# Patient Record
Sex: FEMALE | Race: WHITE | Employment: FULL TIME
[De-identification: names, ages, dates, MRNs, and addresses within clinical notes are randomized per-mention and may not be internally consistent; named-entity substitution may affect disease eponyms.]

---

## 2017-01-12 ENCOUNTER — OFFICE VISIT (OUTPATIENT)
Dept: FAMILY MEDICINE CLINIC | Facility: CLINIC | Age: 22
End: 2017-01-12

## 2017-01-12 DIAGNOSIS — L30.4 PRURITIC INTERTRIGO: ICD-10-CM

## 2017-01-12 DIAGNOSIS — R10.11 RUQ PAIN: ICD-10-CM

## 2017-01-12 DIAGNOSIS — F90.0 ATTENTION DEFICIT HYPERACTIVITY DISORDER (ADHD), PREDOMINANTLY INATTENTIVE TYPE: Primary | ICD-10-CM

## 2017-01-12 PROCEDURE — 99214 OFFICE O/P EST MOD 30 MIN: CPT | Performed by: NURSE PRACTITIONER

## 2017-01-12 RX ORDER — IODOQUINOL, HYDROCORTISONE ACETATE AND ALOE VERA LEAF 10; 20; 10 MG/G; MG/G; MG/G
1 GEL TOPICAL 4 TIMES DAILY
Qty: 48 G | Refills: 1 | Status: SHIPPED | OUTPATIENT
Start: 2017-01-12 | End: 2017-01-23 | Stop reason: SDUPTHER

## 2017-01-12 RX ORDER — DEXTROAMPHETAMINE SACCHARATE, AMPHETAMINE ASPARTATE MONOHYDRATE, DEXTROAMPHETAMINE SULFATE AND AMPHETAMINE SULFATE 5; 5; 5; 5 MG/1; MG/1; MG/1; MG/1
20 CAPSULE, EXTENDED RELEASE ORAL DAILY
Qty: 30 CAPSULE | Refills: 0 | Status: SHIPPED | OUTPATIENT
Start: 2017-01-12 | End: 2017-06-26 | Stop reason: SDUPTHER

## 2017-01-12 RX ORDER — CETIRIZINE HYDROCHLORIDE 10 MG/1
10 TABLET ORAL DAILY
Qty: 30 TABLET | Refills: 3 | Status: SHIPPED | OUTPATIENT
Start: 2017-01-12 | End: 2017-02-22 | Stop reason: SDUPTHER

## 2017-01-12 RX ORDER — DEXTROAMPHETAMINE SACCHARATE, AMPHETAMINE ASPARTATE MONOHYDRATE, DEXTROAMPHETAMINE SULFATE AND AMPHETAMINE SULFATE 5; 5; 5; 5 MG/1; MG/1; MG/1; MG/1
20 CAPSULE, EXTENDED RELEASE ORAL EVERY MORNING
Qty: 30 CAPSULE | Refills: 0 | Status: SHIPPED | OUTPATIENT
Start: 2017-01-12 | End: 2017-02-22

## 2017-01-12 ASSESSMENT — PATIENT HEALTH QUESTIONNAIRE - PHQ9
SUM OF ALL RESPONSES TO PHQ9 QUESTIONS 1 & 2: 0
2. FEELING DOWN, DEPRESSED OR HOPELESS: 0
SUM OF ALL RESPONSES TO PHQ QUESTIONS 1-9: 0
1. LITTLE INTEREST OR PLEASURE IN DOING THINGS: 0

## 2017-01-13 ASSESSMENT — ENCOUNTER SYMPTOMS
ABDOMINAL PAIN: 1
DIARRHEA: 1
ALLERGIC/IMMUNOLOGIC NEGATIVE: 1
WHEEZING: 0
COUGH: 0
EYES NEGATIVE: 1
CONSTIPATION: 0
RESPIRATORY NEGATIVE: 1
SHORTNESS OF BREATH: 0

## 2017-01-23 DIAGNOSIS — L30.4 PRURITIC INTERTRIGO: Primary | ICD-10-CM

## 2017-01-23 RX ORDER — IODOQUINOL, HYDROCORTISONE ACETATE AND ALOE VERA LEAF 10; 20; 10 MG/G; MG/G; MG/G
1 GEL TOPICAL 4 TIMES DAILY
Qty: 48 G | Refills: 1 | Status: SHIPPED | OUTPATIENT
Start: 2017-01-23 | End: 2017-02-22

## 2017-02-13 ENCOUNTER — TELEPHONE (OUTPATIENT)
Dept: FAMILY MEDICINE CLINIC | Facility: CLINIC | Age: 22
End: 2017-02-13

## 2017-02-14 RX ORDER — VALACYCLOVIR HYDROCHLORIDE 1 G/1
2000 TABLET, FILM COATED ORAL 2 TIMES DAILY
Qty: 4 TABLET | Refills: 2 | Status: SHIPPED | OUTPATIENT
Start: 2017-02-14 | End: 2017-02-15

## 2017-02-22 ENCOUNTER — OFFICE VISIT (OUTPATIENT)
Dept: FAMILY MEDICINE CLINIC | Facility: CLINIC | Age: 22
End: 2017-02-22

## 2017-02-22 VITALS
OXYGEN SATURATION: 99 % | SYSTOLIC BLOOD PRESSURE: 120 MMHG | BODY MASS INDEX: 31.07 KG/M2 | DIASTOLIC BLOOD PRESSURE: 68 MMHG | HEART RATE: 101 BPM | WEIGHT: 181 LBS

## 2017-02-22 DIAGNOSIS — L30.4 PRURITIC INTERTRIGO: ICD-10-CM

## 2017-02-22 DIAGNOSIS — Z11.4 SCREENING FOR HIV (HUMAN IMMUNODEFICIENCY VIRUS): ICD-10-CM

## 2017-02-22 DIAGNOSIS — B37.2 CANDIDAL INTERTRIGO: Primary | ICD-10-CM

## 2017-02-22 DIAGNOSIS — J30.2 SEASONAL ALLERGIC RHINITIS, UNSPECIFIED ALLERGIC RHINITIS TRIGGER: ICD-10-CM

## 2017-02-22 PROCEDURE — 99214 OFFICE O/P EST MOD 30 MIN: CPT | Performed by: NURSE PRACTITIONER

## 2017-02-22 RX ORDER — CETIRIZINE HYDROCHLORIDE 10 MG/1
10 TABLET ORAL DAILY
Qty: 30 TABLET | Refills: 3 | Status: SHIPPED | OUTPATIENT
Start: 2017-02-22 | End: 2017-06-26 | Stop reason: SDUPTHER

## 2017-02-22 RX ORDER — RANITIDINE 150 MG/1
150 TABLET ORAL NIGHTLY
Qty: 30 TABLET | Refills: 3 | Status: SHIPPED | OUTPATIENT
Start: 2017-02-22 | End: 2021-07-08

## 2017-02-22 RX ORDER — HYDROCORTISONE, IODOQUINOL 10; 10 MG/G; MG/G
1 CREAM TOPICAL 4 TIMES DAILY
Qty: 60 G | Refills: 0 | Status: SHIPPED | OUTPATIENT
Start: 2017-02-22 | End: 2021-07-08

## 2017-02-22 RX ORDER — MONTELUKAST SODIUM 10 MG/1
10 TABLET ORAL NIGHTLY
Qty: 30 TABLET | Refills: 3 | Status: SHIPPED | OUTPATIENT
Start: 2017-02-22 | End: 2021-07-08

## 2017-02-22 RX ORDER — GABAPENTIN 300 MG/1
CAPSULE ORAL
Qty: 90 CAPSULE | Refills: 3 | Status: SHIPPED | OUTPATIENT
Start: 2017-02-22 | End: 2021-07-08

## 2017-02-22 ASSESSMENT — ENCOUNTER SYMPTOMS
DIARRHEA: 0
GASTROINTESTINAL NEGATIVE: 1
ALLERGIC/IMMUNOLOGIC NEGATIVE: 1
COUGH: 0
SHORTNESS OF BREATH: 0
ABDOMINAL PAIN: 0
RESPIRATORY NEGATIVE: 1
EYES NEGATIVE: 1
CONSTIPATION: 0
WHEEZING: 0

## 2017-03-02 DIAGNOSIS — B37.2 CANDIDAL INTERTRIGO: Primary | ICD-10-CM

## 2017-06-15 ENCOUNTER — TELEPHONE (OUTPATIENT)
Dept: FAMILY MEDICINE CLINIC | Age: 22
End: 2017-06-15

## 2017-06-26 ENCOUNTER — OFFICE VISIT (OUTPATIENT)
Dept: FAMILY MEDICINE CLINIC | Age: 22
End: 2017-06-26
Payer: MEDICARE

## 2017-06-26 VITALS
HEIGHT: 64 IN | OXYGEN SATURATION: 99 % | BODY MASS INDEX: 28.17 KG/M2 | TEMPERATURE: 98.2 F | DIASTOLIC BLOOD PRESSURE: 68 MMHG | HEART RATE: 98 BPM | WEIGHT: 165 LBS | SYSTOLIC BLOOD PRESSURE: 103 MMHG

## 2017-06-26 DIAGNOSIS — J30.2 SEASONAL ALLERGIC RHINITIS, UNSPECIFIED ALLERGIC RHINITIS TRIGGER: ICD-10-CM

## 2017-06-26 DIAGNOSIS — B37.2 CANDIDAL INTERTRIGO: ICD-10-CM

## 2017-06-26 DIAGNOSIS — B02.9 HERPES ZOSTER WITHOUT COMPLICATION: ICD-10-CM

## 2017-06-26 DIAGNOSIS — F90.0 ATTENTION DEFICIT HYPERACTIVITY DISORDER (ADHD), PREDOMINANTLY INATTENTIVE TYPE: Primary | ICD-10-CM

## 2017-06-26 PROCEDURE — 99214 OFFICE O/P EST MOD 30 MIN: CPT | Performed by: NURSE PRACTITIONER

## 2017-06-26 RX ORDER — VALACYCLOVIR HYDROCHLORIDE 1 G/1
TABLET, FILM COATED ORAL
Refills: 0 | COMMUNITY
Start: 2017-04-03 | End: 2017-06-26

## 2017-06-26 RX ORDER — DEXTROAMPHETAMINE SACCHARATE, AMPHETAMINE ASPARTATE MONOHYDRATE, DEXTROAMPHETAMINE SULFATE AND AMPHETAMINE SULFATE 5; 5; 5; 5 MG/1; MG/1; MG/1; MG/1
20 CAPSULE, EXTENDED RELEASE ORAL EVERY MORNING
Qty: 30 CAPSULE | Refills: 0 | Status: SHIPPED | OUTPATIENT
Start: 2017-08-26 | End: 2017-09-18 | Stop reason: SDUPTHER

## 2017-06-26 RX ORDER — CETIRIZINE HYDROCHLORIDE 10 MG/1
10 TABLET ORAL DAILY
Qty: 30 TABLET | Refills: 5 | Status: SHIPPED | OUTPATIENT
Start: 2017-06-26 | End: 2017-09-18 | Stop reason: SDUPTHER

## 2017-06-26 RX ORDER — DEXTROAMPHETAMINE SACCHARATE, AMPHETAMINE ASPARTATE MONOHYDRATE, DEXTROAMPHETAMINE SULFATE AND AMPHETAMINE SULFATE 5; 5; 5; 5 MG/1; MG/1; MG/1; MG/1
20 CAPSULE, EXTENDED RELEASE ORAL EVERY MORNING
Qty: 30 CAPSULE | Refills: 0 | Status: SHIPPED | OUTPATIENT
Start: 2017-07-26 | End: 2017-09-18 | Stop reason: SDUPTHER

## 2017-06-26 RX ORDER — DEXTROAMPHETAMINE SACCHARATE, AMPHETAMINE ASPARTATE MONOHYDRATE, DEXTROAMPHETAMINE SULFATE AND AMPHETAMINE SULFATE 5; 5; 5; 5 MG/1; MG/1; MG/1; MG/1
20 CAPSULE, EXTENDED RELEASE ORAL DAILY
Qty: 30 CAPSULE | Refills: 0 | Status: SHIPPED | OUTPATIENT
Start: 2017-06-26 | End: 2017-09-18 | Stop reason: SDUPTHER

## 2017-06-26 ASSESSMENT — ENCOUNTER SYMPTOMS
EYES NEGATIVE: 1
ABDOMINAL PAIN: 0
GASTROINTESTINAL NEGATIVE: 1
COUGH: 0
RESPIRATORY NEGATIVE: 1
DIARRHEA: 0
WHEEZING: 0
CONSTIPATION: 0
SHORTNESS OF BREATH: 0
ALLERGIC/IMMUNOLOGIC NEGATIVE: 1

## 2017-09-18 ENCOUNTER — OFFICE VISIT (OUTPATIENT)
Dept: FAMILY MEDICINE CLINIC | Age: 22
End: 2017-09-18
Payer: MEDICARE

## 2017-09-18 VITALS
TEMPERATURE: 98.1 F | HEART RATE: 91 BPM | OXYGEN SATURATION: 98 % | BODY MASS INDEX: 27.29 KG/M2 | DIASTOLIC BLOOD PRESSURE: 68 MMHG | SYSTOLIC BLOOD PRESSURE: 112 MMHG | WEIGHT: 159 LBS

## 2017-09-18 DIAGNOSIS — J30.2 SEASONAL ALLERGIC RHINITIS, UNSPECIFIED ALLERGIC RHINITIS TRIGGER: ICD-10-CM

## 2017-09-18 DIAGNOSIS — Z30.44 ENCOUNTER FOR SURVEILLANCE OF VAGINAL RING HORMONAL CONTRACEPTIVE DEVICE: ICD-10-CM

## 2017-09-18 DIAGNOSIS — F90.0 ATTENTION DEFICIT HYPERACTIVITY DISORDER (ADHD), PREDOMINANTLY INATTENTIVE TYPE: Primary | ICD-10-CM

## 2017-09-18 PROCEDURE — 99214 OFFICE O/P EST MOD 30 MIN: CPT | Performed by: NURSE PRACTITIONER

## 2017-09-18 RX ORDER — DEXTROAMPHETAMINE SACCHARATE, AMPHETAMINE ASPARTATE, DEXTROAMPHETAMINE SULFATE AND AMPHETAMINE SULFATE 1.25; 1.25; 1.25; 1.25 MG/1; MG/1; MG/1; MG/1
5 TABLET ORAL DAILY
Qty: 30 TABLET | Refills: 0 | Status: SHIPPED | OUTPATIENT
Start: 2017-10-18 | End: 2017-12-05 | Stop reason: SDUPTHER

## 2017-09-18 RX ORDER — DEXTROAMPHETAMINE SACCHARATE, AMPHETAMINE ASPARTATE MONOHYDRATE, DEXTROAMPHETAMINE SULFATE AND AMPHETAMINE SULFATE 5; 5; 5; 5 MG/1; MG/1; MG/1; MG/1
20 CAPSULE, EXTENDED RELEASE ORAL EVERY MORNING
Qty: 30 CAPSULE | Refills: 0 | Status: SHIPPED | OUTPATIENT
Start: 2017-10-18 | End: 2017-12-18 | Stop reason: SDUPTHER

## 2017-09-18 RX ORDER — DEXTROAMPHETAMINE SACCHARATE, AMPHETAMINE ASPARTATE MONOHYDRATE, DEXTROAMPHETAMINE SULFATE AND AMPHETAMINE SULFATE 5; 5; 5; 5 MG/1; MG/1; MG/1; MG/1
20 CAPSULE, EXTENDED RELEASE ORAL DAILY
Qty: 30 CAPSULE | Refills: 0 | Status: SHIPPED | OUTPATIENT
Start: 2017-11-18 | End: 2017-12-18 | Stop reason: SDUPTHER

## 2017-09-18 RX ORDER — CETIRIZINE HYDROCHLORIDE 10 MG/1
10 TABLET ORAL DAILY
Qty: 30 TABLET | Refills: 5 | Status: SHIPPED | OUTPATIENT
Start: 2017-09-18 | End: 2021-07-08

## 2017-09-18 RX ORDER — DEXTROAMPHETAMINE SACCHARATE, AMPHETAMINE ASPARTATE, DEXTROAMPHETAMINE SULFATE AND AMPHETAMINE SULFATE 1.25; 1.25; 1.25; 1.25 MG/1; MG/1; MG/1; MG/1
5 TABLET ORAL DAILY
Qty: 30 TABLET | Refills: 0 | Status: SHIPPED | OUTPATIENT
Start: 2017-09-18 | End: 2017-12-18 | Stop reason: SDUPTHER

## 2017-09-18 RX ORDER — DEXTROAMPHETAMINE SACCHARATE, AMPHETAMINE ASPARTATE MONOHYDRATE, DEXTROAMPHETAMINE SULFATE AND AMPHETAMINE SULFATE 5; 5; 5; 5 MG/1; MG/1; MG/1; MG/1
20 CAPSULE, EXTENDED RELEASE ORAL EVERY MORNING
Qty: 30 CAPSULE | Refills: 0 | Status: SHIPPED | OUTPATIENT
Start: 2017-09-18 | End: 2017-12-05 | Stop reason: SDUPTHER

## 2017-09-18 RX ORDER — DEXTROAMPHETAMINE SACCHARATE, AMPHETAMINE ASPARTATE, DEXTROAMPHETAMINE SULFATE AND AMPHETAMINE SULFATE 1.25; 1.25; 1.25; 1.25 MG/1; MG/1; MG/1; MG/1
5 TABLET ORAL DAILY
Qty: 30 TABLET | Refills: 0 | Status: SHIPPED | OUTPATIENT
Start: 2017-11-18 | End: 2017-12-18 | Stop reason: SDUPTHER

## 2017-09-18 RX ORDER — ETONOGESTREL AND ETHINYL ESTRADIOL 11.7; 2.7 MG/1; MG/1
1 INSERT, EXTENDED RELEASE VAGINAL
Qty: 11 EACH | Refills: 0 | Status: SHIPPED | OUTPATIENT
Start: 2017-09-18 | End: 2018-08-26 | Stop reason: SDUPTHER

## 2017-09-18 ASSESSMENT — ENCOUNTER SYMPTOMS
RHINORRHEA: 1
RESPIRATORY NEGATIVE: 1
ROS SKIN COMMENTS: MOLE

## 2017-12-05 ENCOUNTER — OFFICE VISIT (OUTPATIENT)
Dept: FAMILY MEDICINE CLINIC | Age: 22
End: 2017-12-05
Payer: MEDICARE

## 2017-12-05 VITALS
HEART RATE: 92 BPM | BODY MASS INDEX: 24.62 KG/M2 | TEMPERATURE: 98 F | HEIGHT: 64 IN | RESPIRATION RATE: 16 BRPM | DIASTOLIC BLOOD PRESSURE: 82 MMHG | SYSTOLIC BLOOD PRESSURE: 110 MMHG | OXYGEN SATURATION: 98 % | WEIGHT: 144.2 LBS

## 2017-12-05 DIAGNOSIS — B37.2 CANDIDAL INTERTRIGO: ICD-10-CM

## 2017-12-05 DIAGNOSIS — B96.89 ACUTE BACTERIAL SINUSITIS: Primary | ICD-10-CM

## 2017-12-05 DIAGNOSIS — J01.90 ACUTE BACTERIAL SINUSITIS: Primary | ICD-10-CM

## 2017-12-05 DIAGNOSIS — Z72.0 TOBACCO ABUSE: ICD-10-CM

## 2017-12-05 PROCEDURE — G8484 FLU IMMUNIZE NO ADMIN: HCPCS | Performed by: INTERNAL MEDICINE

## 2017-12-05 PROCEDURE — G8420 CALC BMI NORM PARAMETERS: HCPCS | Performed by: INTERNAL MEDICINE

## 2017-12-05 PROCEDURE — 99213 OFFICE O/P EST LOW 20 MIN: CPT | Performed by: INTERNAL MEDICINE

## 2017-12-05 PROCEDURE — G8427 DOCREV CUR MEDS BY ELIG CLIN: HCPCS | Performed by: INTERNAL MEDICINE

## 2017-12-05 PROCEDURE — 4004F PT TOBACCO SCREEN RCVD TLK: CPT | Performed by: INTERNAL MEDICINE

## 2017-12-05 RX ORDER — BENZONATATE 100 MG/1
100 CAPSULE ORAL 3 TIMES DAILY PRN
Qty: 20 CAPSULE | Refills: 0 | Status: SHIPPED | OUTPATIENT
Start: 2017-12-05 | End: 2017-12-12

## 2017-12-05 RX ORDER — OXYMETAZOLINE HYDROCHLORIDE 0.05 G/100ML
2 SPRAY NASAL 2 TIMES DAILY
Qty: 1 BOTTLE | Refills: 0 | Status: SHIPPED | OUTPATIENT
Start: 2017-12-05 | End: 2017-12-08

## 2017-12-05 RX ORDER — AMOXICILLIN AND CLAVULANATE POTASSIUM 875; 125 MG/1; MG/1
1 TABLET, FILM COATED ORAL 2 TIMES DAILY
Qty: 14 TABLET | Refills: 0 | Status: SHIPPED | OUTPATIENT
Start: 2017-12-05 | End: 2017-12-12

## 2017-12-05 NOTE — PROGRESS NOTES
Subjective:       Patient ID: Rosalee Núñez is a 20 y.o. female who presents for   Chief Complaint   Patient presents with    URI    Dizziness    Epistaxis   , and follow up of chronic medical problems. HPI:  Nursing note reviewed and discussed with patient. Sinus Pain  Patient complains of congestion, cough, frequent clearing of the throat, sinus pressure and epistaxis. Onset of symptoms was 2 weeks ago. Symptoms have been gradually worsening since that time. She is drinking plenty of fluids. No fevers, chills. Tactile fever yesterday. She reports abdominal pain and chest pain that started today when she is coughing. She has also been having emesis since early this mroning. Abdominal pain started after emesis. No diarrhea, has normal BMs. Past history is significant for nothing. Patient is smoker  (4 cig/day currently). Has been taking theraflu and nyquil without relief for 2 weeks. Patient's medications, allergies, past medical, surgical, social and family histories were reviewed and updated as appropriate. Social History   Substance Use Topics    Smoking status: Current Every Day Smoker     Packs/day: 0.25     Types: Cigarettes    Smokeless tobacco: Never Used      Comment: Patient current vapes    Alcohol use No      Comment: Rarely. Review of Systems  Energy level good overall, and weight is stable. No chest pain or shortness of breath. Bowels have been normal without constipation or diarrhea         Objective:        Physical Exam:  /82 (Site: Right Arm, Position: Sitting, Cuff Size: Medium Adult)   Pulse 92   Temp 98 °F (36.7 °C) (Oral)   Resp 16   Ht 5' 4.02\" (1.626 m)   Wt 144 lb 3.2 oz (65.4 kg)   LMP 11/01/2017   SpO2 98%   BMI 24.74 kg/m²     Constitutional: oriented to person, place, and time. Well-developed and well-nourished and in no acute distress. Head: Normocephalic and atraumatic.    Eyes: no conjunctival injection noted  Right Ear: External ear normal.  Tympanic membrane is normal in appearance. Left Ear: External ear normal.  Tympanic membranes is normal in appearance. Nose: erythematous edematous mucosa. Purulent rhinorrhea and epistaxis noted R nare  Sinuses: no frontal or maxillary sinus tenderness  Throat: no erythema, tonsillar hypertrophy or exudate  Neck: Normal range of motion. Neck supple. No tracheal deviation present. Pulmonary/Chest: Effort normal and breath sounds normal. No rales or wheezes. No chest retraction. Tenderness to palpation noted R 3-5 costocondral junctions. Cardiovascular: normal S1S2, regular rhythm, no murmurs  Abdomen: soft, nontender, normal bowel sounds     Prior to Visit Medications    Medication Sig Taking? Authorizing Provider   amphetamine-dextroamphetamine (ADDERALL XR) 20 MG extended release capsule Take 1 capsule by mouth daily . Yes Cyrilla Pill, NP   cetirizine (ZYRTEC ALLERGY) 10 MG tablet Take 1 tablet by mouth daily Yes Cyrilla Pill, NP   etonogestrel-ethinyl estradiol (NUVARING) 0.12-0.015 MG/24HR vaginal ring Place 1 each vaginally every 21 days Insert one (1) ring vaginally and leave in place for three (3) weeks, then remove for one (1) week. Yes Viji Barnes NP   amphetamine-dextroamphetamine (ADDERALL, 5MG,) 5 MG tablet Take 1 tablet by mouth daily . Yes Viji Barnes NP   miconazole (ZEASORB-AF) 2 % powder Apply topically 2 times daily. Yes Cyrilla Pill, NP   ranitidine (ZANTAC) 150 MG tablet Take 1 tablet by mouth nightly Yes Viji Barnes NP   amphetamine-dextroamphetamine (ADDERALL XR) 20 MG extended release capsule Take 1 capsule by mouth every morning . Homer Barnes NP   amphetamine-dextroamphetamine (ADDERALL XR) 20 MG extended release capsule Take 1 capsule by mouth every morning . Viji Barnes NP   amphetamine-dextroamphetamine (ADDERALL, 5MG,) 5 MG tablet Take 1 tablet by mouth daily .   Viji Barnes NP   amphetamine-dextroamphetamine (ADDERALL, 5MG,) 5 MG tablet Take 1 tablet by mouth daily . Daisy Knox NP   nystatin-triamcinolone (MYCOLOG II) 377066-1.7 UNIT/GM-% cream Apply topically 2 times daily. Viji Barnes NP   montelukast (SINGULAIR) 10 MG tablet Take 1 tablet by mouth nightly  Viji Barnes NP   gabapentin (NEURONTIN) 300 MG capsule TID  Viji Barnes NP   Iodoquinol-HC (HYDROCORTISONE-IODOQUINOL) 1-1 % CREA Apply 1 Units topically 4 times daily  Viji Barnes NP   ciclopirox (LOPROX) 0.77 % cream Apply topically 2 times daily. Daisy Knox NP   NUVARING 0.12-0.015 MG/24HR vaginal ring   Historical Provider, MD       Data Review      Assessment/Plan:      1. Acute bacterial sinusitis  - amoxicillin-clavulanate (AUGMENTIN) 875-125 MG per tablet; Take 1 tablet by mouth 2 times daily for 7 days  Dispense: 14 tablet; Refill: 0  - oxymetazoline (12 HOUR NASAL SPRAY) 0.05 % nasal spray; 2 sprays by Nasal route 2 times daily for 3 days  Dispense: 1 Bottle; Refill: 0  - benzonatate (TESSALON PERLES) 100 MG capsule; Take 1 capsule by mouth 3 times daily as needed for Cough  Dispense: 20 capsule; Refill: 0    2. Candidal intertrigo  - miconazole (ZEASORB-AF) 2 % powder; Apply topically 2 times daily. Dispense: 45 g; Refill: 5    3.  Tobacco abuse   precontemplative, counseling declined         Electronically signed by Parris Jones MD on 12/5/2017 at 5:21 PM

## 2017-12-05 NOTE — PROGRESS NOTES
Patient is present because she is not feeling good. She states that she has been getting dizzy today and seeing stars. Pt also complains of frequent nose bleeds today. Last nose bleed around 2pm. Pt is complains of body aches ans severe headaches. Pt has been taking Theraflu and Nyquil for two weeks with no relief. Pt thinks she may have had a fever last night. Pt started vomiting today. No other concerns at this time. Visit Information    Have you changed or started any medications since your last visit including any over-the-counter medicines, vitamins, or herbal medicines? no   Have you stopped taking any of your medications? Is so, why? -  no  Are you having any side effects from any of your medications? - no    Have you seen any other physician or provider since your last visit?  no   Have you had any other diagnostic tests since your last visit? yes - ct   Have you been seen in the emergency room and/or had an admission in a hospital since we last saw you?  yes - 10/20/17 [address]  Have you had your routine dental cleaning in the past 6 months?  no     Do you have an active MyChart account? If no, what is the barrier?   Yes    Patient Care Team:  Jessa Billingsley NP as PCP - General (Certified Nurse Practitioner)  Zamzam Martínez MD as Obstetrician (Perinatology)  Karyle Kempf, DO as Consulting Physician (Obstetrics & Gynecology)    Medical History Review  Past Medical, Family, and Social History reviewed and does not contribute to the patient presenting condition    Health Maintenance   Topic Date Due    Cervical cancer screen  01/24/2015    DTaP/Tdap/Td vaccine (1 - Tdap) 02/22/2018 (Originally 1/24/2013)    Flu vaccine (1) 02/22/2018 (Originally 9/1/2017)    HIV screen  06/26/2018 (Originally 1/24/2009)    Chlamydia screen  06/26/2018 (Originally 1/24/2010)

## 2017-12-18 ENCOUNTER — HOSPITAL ENCOUNTER (OUTPATIENT)
Age: 22
Setting detail: SPECIMEN
Discharge: HOME | End: 2017-12-18
Payer: MEDICARE

## 2017-12-18 ENCOUNTER — OFFICE VISIT (OUTPATIENT)
Dept: FAMILY MEDICINE CLINIC | Age: 22
End: 2017-12-18
Payer: MEDICARE

## 2017-12-18 VITALS
OXYGEN SATURATION: 99 % | DIASTOLIC BLOOD PRESSURE: 68 MMHG | SYSTOLIC BLOOD PRESSURE: 116 MMHG | TEMPERATURE: 97.5 F | WEIGHT: 142 LBS | HEART RATE: 84 BPM | BODY MASS INDEX: 24.36 KG/M2

## 2017-12-18 DIAGNOSIS — F90.0 ATTENTION DEFICIT HYPERACTIVITY DISORDER (ADHD), PREDOMINANTLY INATTENTIVE TYPE: Primary | ICD-10-CM

## 2017-12-18 DIAGNOSIS — Z79.899 HIGH RISK MEDICATION USE: ICD-10-CM

## 2017-12-18 LAB
AMPHETAMINE SCREEN URINE: NEGATIVE
BARBITURATE SCREEN URINE: NEGATIVE
BENZODIAZEPINE SCREEN, URINE: NEGATIVE
BUPRENORPHINE URINE: NORMAL
CANNABINOID SCREEN URINE: NEGATIVE
COCAINE METABOLITE, URINE: NEGATIVE
MDMA URINE: NORMAL
METHADONE SCREEN, URINE: NEGATIVE
METHAMPHETAMINE, URINE: NORMAL
OPIATES, URINE: NEGATIVE
OXYCODONE SCREEN URINE: NEGATIVE
PHENCYCLIDINE, URINE: NEGATIVE
PROPOXYPHENE, URINE: NORMAL
TEST INFORMATION: NORMAL
TRICYCLIC ANTIDEPRESSANTS, UR: NORMAL

## 2017-12-18 PROCEDURE — 4004F PT TOBACCO SCREEN RCVD TLK: CPT | Performed by: NURSE PRACTITIONER

## 2017-12-18 PROCEDURE — G8427 DOCREV CUR MEDS BY ELIG CLIN: HCPCS | Performed by: NURSE PRACTITIONER

## 2017-12-18 PROCEDURE — G8420 CALC BMI NORM PARAMETERS: HCPCS | Performed by: NURSE PRACTITIONER

## 2017-12-18 PROCEDURE — 99213 OFFICE O/P EST LOW 20 MIN: CPT | Performed by: NURSE PRACTITIONER

## 2017-12-18 PROCEDURE — G8484 FLU IMMUNIZE NO ADMIN: HCPCS | Performed by: NURSE PRACTITIONER

## 2017-12-18 RX ORDER — DEXTROAMPHETAMINE SACCHARATE, AMPHETAMINE ASPARTATE, DEXTROAMPHETAMINE SULFATE AND AMPHETAMINE SULFATE 1.25; 1.25; 1.25; 1.25 MG/1; MG/1; MG/1; MG/1
5 TABLET ORAL DAILY
Qty: 30 TABLET | Refills: 0 | Status: SHIPPED | OUTPATIENT
Start: 2018-01-18 | End: 2022-03-22 | Stop reason: HOSPADM

## 2017-12-18 RX ORDER — DEXTROAMPHETAMINE SACCHARATE, AMPHETAMINE ASPARTATE MONOHYDRATE, DEXTROAMPHETAMINE SULFATE AND AMPHETAMINE SULFATE 5; 5; 5; 5 MG/1; MG/1; MG/1; MG/1
20 CAPSULE, EXTENDED RELEASE ORAL EVERY MORNING
Qty: 30 CAPSULE | Refills: 0 | Status: SHIPPED | OUTPATIENT
Start: 2017-12-28 | End: 2021-07-08 | Stop reason: ALTCHOICE

## 2017-12-18 RX ORDER — DEXTROAMPHETAMINE SACCHARATE, AMPHETAMINE ASPARTATE MONOHYDRATE, DEXTROAMPHETAMINE SULFATE AND AMPHETAMINE SULFATE 5; 5; 5; 5 MG/1; MG/1; MG/1; MG/1
20 CAPSULE, EXTENDED RELEASE ORAL DAILY
Qty: 30 CAPSULE | Refills: 0 | Status: SHIPPED | OUTPATIENT
Start: 2018-01-28 | End: 2021-07-08 | Stop reason: ALTCHOICE

## 2017-12-18 RX ORDER — DEXTROAMPHETAMINE SACCHARATE, AMPHETAMINE ASPARTATE, DEXTROAMPHETAMINE SULFATE AND AMPHETAMINE SULFATE 1.25; 1.25; 1.25; 1.25 MG/1; MG/1; MG/1; MG/1
5 TABLET ORAL DAILY
Qty: 30 TABLET | Refills: 0 | Status: SHIPPED | OUTPATIENT
Start: 2017-12-18 | End: 2021-07-08 | Stop reason: ALTCHOICE

## 2017-12-18 RX ORDER — DEXTROAMPHETAMINE SACCHARATE, AMPHETAMINE ASPARTATE, DEXTROAMPHETAMINE SULFATE AND AMPHETAMINE SULFATE 1.25; 1.25; 1.25; 1.25 MG/1; MG/1; MG/1; MG/1
5 TABLET ORAL DAILY
Qty: 30 TABLET | Refills: 0 | Status: SHIPPED | OUTPATIENT
Start: 2018-02-18 | End: 2017-12-19 | Stop reason: SDDI

## 2017-12-18 RX ORDER — DEXTROAMPHETAMINE SACCHARATE, AMPHETAMINE ASPARTATE MONOHYDRATE, DEXTROAMPHETAMINE SULFATE AND AMPHETAMINE SULFATE 5; 5; 5; 5 MG/1; MG/1; MG/1; MG/1
20 CAPSULE, EXTENDED RELEASE ORAL EVERY MORNING
Qty: 30 CAPSULE | Refills: 0 | Status: SHIPPED | OUTPATIENT
Start: 2017-12-18 | End: 2017-12-19 | Stop reason: SDDI

## 2017-12-18 ASSESSMENT — ENCOUNTER SYMPTOMS: GASTROINTESTINAL NEGATIVE: 1

## 2017-12-18 NOTE — PROGRESS NOTES
Patient is present for medication refills. Patient does not have a pain contract with us and no urine drug screen. Patients last dose of adderall was yesterday. Medications and pharmacy reviewed with patient. Visit Information    Have you changed or started any medications since your last visit including any over-the-counter medicines, vitamins, or herbal medicines? no   Have you stopped taking any of your medications? Is so, why? -  no  Are you having any side effects from any of your medications? - no    Have you seen any other physician or provider since your last visit?  no   Have you had any other diagnostic tests since your last visit?  no   Have you been seen in the emergency room and/or had an admission in a hospital since we last saw you?  no   Have you had your routine dental cleaning in the past 6 months?  no     Do you have an active MyChart account? If no, what is the barrier?   Yes    Patient Care Team:  Sergio Estrada NP as PCP - General (Certified Nurse Practitioner)  El Hart MD as Obstetrician (Perinatology)  Talisha Dominguez DO as Consulting Physician (Obstetrics & Gynecology)    Medical History Review  Past Medical, Family, and Social History reviewed and does contribute to the patient presenting condition    Health Maintenance   Topic Date Due    Cervical cancer screen  01/24/2015    DTaP/Tdap/Td vaccine (1 - Tdap) 02/22/2018 (Originally 1/24/2013)    Flu vaccine (1) 02/22/2018 (Originally 9/1/2017)    Pneumococcal med risk (1 of 1 - PPSV23) 02/22/2018 (Originally 1/24/2013)    HIV screen  06/26/2018 (Originally 1/24/2009)    Chlamydia screen  06/26/2018 (Originally 1/24/2010)

## 2017-12-18 NOTE — PROGRESS NOTES
[address] [place] [id number]  Dept: [phone]  Dept Fax: [phone]      Crispin Foy is a 20 y.o. female who presents today for her medical conditions/complaints as noted below. Crispin Foy is c/o of Medication Refill            HPI:     AROLDO Klineramez Troncoso is here for routine 3 mo ADHD visit. She is doing well on her medication dosing. She is taking 20 xr in the am and 5 ir in the afternoon. She is not having any negative side effects. She is working in a much more physical job. She has lost 50+ lbs over the last year. She is feeling good, doing [facility] as well. She was in last week and treated with atb. She is still feeling muffled hearing in her left ear. Overall is feeling better.      No results found for: LABA1C          ( goal A1C is < 7)   No results found for: LABMICR  LDL Cholesterol (mg/dL)   Date Value   06/17/2016 102       (goal LDL is <100)   AST (U/L)   Date Value   06/17/2016 15     ALT (U/L)   Date Value   06/17/2016 14     BUN (mg/dL)   Date Value   06/17/2016 10     BP Readings from Last 3 Encounters:   12/18/17 116/68   12/05/17 110/82   09/18/17 112/68          (goal 120/80)    Past Medical History:   Diagnosis Date    Attention deficit hyperactivity disorder (ADHD), predominantly inattentive type 10/20/2016    Chronic back pain     Obesity     Ovarian cyst, bilateral     see's gynecology, Dr Omar Marie Seasonal allergic rhinitis 2/22/2017      Past Surgical History:   Procedure Laterality Date    TEAR DUCT SURGERY Bilateral age 21 months    TONSILLECTOMY AND ADENOIDECTOMY  age 10       Family History   Problem Relation Age of Onset    Anxiety Disorder Mother     Depression Mother     Bipolar Disorder Sister        Social History   Substance Use Topics    Smoking status: Current Every Day Smoker     Packs/day: 0.25     Types: Cigarettes    Smokeless tobacco: Never Used      Comment: Patient current vapes    Alcohol use No      Comment: Rarely. Current Outpatient Prescriptions   Medication Sig Dispense Refill    amphetamine-dextroamphetamine (ADDERALL XR) 20 MG extended release capsule Take 1 capsule by mouth every morning . 30 capsule 0    [START ON 1/28/2018] amphetamine-dextroamphetamine (ADDERALL XR) 20 MG extended release capsule Take 1 capsule by mouth daily . 30 capsule 0    [START ON 2/18/2018] amphetamine-dextroamphetamine (ADDERALL, 5MG,) 5 MG tablet Take 1 tablet by mouth daily . 30 tablet 0    [START ON 1/18/2018] amphetamine-dextroamphetamine (ADDERALL, 5MG,) 5 MG tablet Take 1 tablet by mouth daily . 30 tablet 0    [START ON 12/28/2017] amphetamine-dextroamphetamine (ADDERALL XR) 20 MG extended release capsule Take 1 capsule by mouth every morning . 30 capsule 0    amphetamine-dextroamphetamine (ADDERALL, 5MG,) 5 MG tablet Take 1 tablet by mouth daily . 30 tablet 0    miconazole (ZEASORB-AF) 2 % powder Apply topically 2 times daily. 45 g 5    cetirizine (ZYRTEC ALLERGY) 10 MG tablet Take 1 tablet by mouth daily 30 tablet 5    etonogestrel-ethinyl estradiol (NUVARING) 0.12-0.015 MG/24HR vaginal ring Place 1 each vaginally every 21 days Insert one (1) ring vaginally and leave in place for three (3) weeks, then remove for one (1) week. 11 each 0    montelukast (SINGULAIR) 10 MG tablet Take 1 tablet by mouth nightly 30 tablet 3    ranitidine (ZANTAC) 150 MG tablet Take 1 tablet by mouth nightly 30 tablet 3    gabapentin (NEURONTIN) 300 MG capsule TID 90 capsule 3    Iodoquinol-HC (HYDROCORTISONE-IODOQUINOL) 1-1 % CREA Apply 1 Units topically 4 times daily 60 g 0    ciclopirox (LOPROX) 0.77 % cream Apply topically 2 times daily. 30 g 3    nystatin-triamcinolone (MYCOLOG II) 238405-0.8 UNIT/GM-% cream Apply topically 2 times daily.  30 g 2     Current Facility-Administered Medications   Medication Dose Route Frequency Provider Last Rate Last Dose    diphenhydrAMINE (BENADRYL) capsule 50 mg

## 2017-12-19 ENCOUNTER — TELEPHONE (OUTPATIENT)
Dept: FAMILY MEDICINE CLINIC | Age: 22
End: 2017-12-19

## 2017-12-19 DIAGNOSIS — F90.0 ATTENTION DEFICIT HYPERACTIVITY DISORDER (ADHD), PREDOMINANTLY INATTENTIVE TYPE: Primary | ICD-10-CM

## 2017-12-19 DIAGNOSIS — Z79.899 HIGH RISK MEDICATION USE: ICD-10-CM

## 2017-12-19 NOTE — TELEPHONE ENCOUNTER
Pt called back and apologized for behavior on the phone. Pt was reassured we are not singling her out. We would like to continue seeing her as a pt. Pt still would like a call from [facility] [place] [address] to discuss negative drug screen. Pt was informed Crystal will call her to discuss at the end of the day, after pts. Pt is agreeable to see psych.

## 2017-12-19 NOTE — TELEPHONE ENCOUNTER
Left message for patient to call the office. Patients urine drug screen did not show adderall in her system so we will no longer be prescribing medication to her that is controlled. Her prescriptions were cancelled at the pharmacy. We can refer her to [facility] if she would like us to.

## 2017-12-22 LAB
6-ACETYLMORPHINE, UR: NOT DETECTED
7-AMINOCLONAZEPAM, URINE: NOT DETECTED
ALPHA-OH-ALPRAZ, URINE: NOT DETECTED
ALPRAZOLAM, URINE: NOT DETECTED
AMPHETAMINES, URINE: NOT DETECTED
BARBITURATES, URINE: NOT DETECTED
BENZOYLECGONINE, UR: NOT DETECTED
BUPRENORPHINE URINE: NOT DETECTED
CARISOPRODOL, UR: NOT DETECTED
CLONAZEPAM, URINE: NOT DETECTED
CODEINE, URINE: NOT DETECTED
CREATININE URINE: 286.8 MG/DL (ref 20–400)
DIAZEPAM, URINE: NOT DETECTED
EER PAIN MGT DRUG PANEL, HIGH RES/EMIT U: NORMAL
ETHYL GLUCURONIDE UR: PRESENT
FENTANYL URINE: NOT DETECTED
HYDROCODONE, URINE: NOT DETECTED
HYDROMORPHONE, URINE: NOT DETECTED
LORAZEPAM, URINE: NOT DETECTED
MARIJUANA METAB, UR: PRESENT
MDA, UR: NOT DETECTED
MDEA, EVE, UR: NOT DETECTED
MDMA URINE: NOT DETECTED
MEPERIDINE METAB, UR: NOT DETECTED
METHADONE, URINE: NOT DETECTED
METHAMPHETAMINE, URINE: NOT DETECTED
METHYLPHENIDATE: NOT DETECTED
MIDAZOLAM, URINE: NOT DETECTED
MORPHINE URINE: NOT DETECTED
NORBUPRENORPHINE, URINE: NOT DETECTED
NORDIAZEPAM, URINE: NOT DETECTED
NORFENTANYL, URINE: NOT DETECTED
NORHYDROCODONE, URINE: NOT DETECTED
NOROXYCODONE, URINE: NOT DETECTED
NOROXYMORPHONE, URINE: NOT DETECTED
OXAZEPAM, URINE: NOT DETECTED
OXYCODONE URINE: NOT DETECTED
OXYMORPHONE, URINE: NOT DETECTED
PAIN MGT DRUG PANEL, HI RES, UR: NORMAL
PCP,URINE: NOT DETECTED
PHENTERMINE, UR: NOT DETECTED
PROPOXYPHENE, URINE: NOT DETECTED
TAPENTADOL, URINE: NOT DETECTED
TAPENTADOL-O-SULFATE, URINE: NOT DETECTED
TEMAZEPAM, URINE: NOT DETECTED
TRAMADOL, URINE: NOT DETECTED
ZOLPIDEM, URINE: NOT DETECTED

## 2018-03-19 ENCOUNTER — OFFICE VISIT (OUTPATIENT)
Dept: FAMILY MEDICINE CLINIC | Age: 23
End: 2018-03-19
Payer: MEDICARE

## 2018-03-19 VITALS
HEART RATE: 72 BPM | WEIGHT: 153 LBS | BODY MASS INDEX: 26.25 KG/M2 | OXYGEN SATURATION: 99 % | DIASTOLIC BLOOD PRESSURE: 70 MMHG | SYSTOLIC BLOOD PRESSURE: 120 MMHG | TEMPERATURE: 97.7 F

## 2018-03-19 DIAGNOSIS — F90.0 ATTENTION DEFICIT HYPERACTIVITY DISORDER (ADHD), PREDOMINANTLY INATTENTIVE TYPE: Primary | ICD-10-CM

## 2018-03-19 DIAGNOSIS — Z13.220 SCREENING FOR HYPERLIPIDEMIA: ICD-10-CM

## 2018-03-19 DIAGNOSIS — Z79.899 LONG-TERM USE OF HIGH-RISK MEDICATION: ICD-10-CM

## 2018-03-19 DIAGNOSIS — R53.83 FATIGUE, UNSPECIFIED TYPE: ICD-10-CM

## 2018-03-19 PROCEDURE — 4004F PT TOBACCO SCREEN RCVD TLK: CPT | Performed by: NURSE PRACTITIONER

## 2018-03-19 PROCEDURE — G8484 FLU IMMUNIZE NO ADMIN: HCPCS | Performed by: NURSE PRACTITIONER

## 2018-03-19 PROCEDURE — G8419 CALC BMI OUT NRM PARAM NOF/U: HCPCS | Performed by: NURSE PRACTITIONER

## 2018-03-19 PROCEDURE — 99213 OFFICE O/P EST LOW 20 MIN: CPT | Performed by: NURSE PRACTITIONER

## 2018-03-19 PROCEDURE — G8427 DOCREV CUR MEDS BY ELIG CLIN: HCPCS | Performed by: NURSE PRACTITIONER

## 2018-03-19 ASSESSMENT — PATIENT HEALTH QUESTIONNAIRE - PHQ9
SUM OF ALL RESPONSES TO PHQ QUESTIONS 1-9: 0
SUM OF ALL RESPONSES TO PHQ9 QUESTIONS 1 & 2: 0
2. FEELING DOWN, DEPRESSED OR HOPELESS: 0
1. LITTLE INTEREST OR PLEASURE IN DOING THINGS: 0

## 2018-03-19 ASSESSMENT — ENCOUNTER SYMPTOMS: GASTROINTESTINAL NEGATIVE: 1

## 2018-03-19 NOTE — PROGRESS NOTES
[address] [facility] [address]  Dept: [phone]  Dept Fax: [phone]      Bob Kim is a 24 y.o. female who presents today for her medical conditions/complaints as noted below. Bob Kim is c/o of 3 Month Follow-Up; Medication Check; and Medication Refill            HPI:     HPI Jay Gitelman is here having difficulty finding a psych provider to get into. She did try [facility], they told her they would call her back and she has not heard anything yet. She now has a driving route for work and is feeling a lack of focus and disorganization. Discussion re her lack of drug in urine screening- she shares she had been ill and had been off of the medicaiton for a week. She cannot say why she told the MA that she had taken the med the day before.        No results found for: LABA1C          ( goal A1C is < 7)   No results found for: LABMICR  LDL Cholesterol (mg/dL)   Date Value   06/17/2016 102       (goal LDL is <100)   AST (U/L)   Date Value   06/17/2016 15     ALT (U/L)   Date Value   06/17/2016 14     BUN (mg/dL)   Date Value   06/17/2016 10     BP Readings from Last 3 Encounters:   03/19/18 120/70   12/18/17 116/68   12/05/17 110/82          (goal 120/80)    Past Medical History:   Diagnosis Date    Attention deficit hyperactivity disorder (ADHD), predominantly inattentive type 10/20/2016    Chronic back pain     Obesity     Ovarian cyst, bilateral     see's gynecology, Dr Mary Matos Seasonal allergic rhinitis 2/22/2017      Past Surgical History:   Procedure Laterality Date    TEAR DUCT SURGERY Bilateral age 21 months   Marilyn Cutting TONSILLECTOMY AND ADENOIDECTOMY  age 10       Family History   Problem Relation Age of Onset    Anxiety Disorder Mother     Depression Mother     Bipolar Disorder Sister        Social History   Substance Use Topics    Smoking status: Current Every Day Smoker     Packs/day: 0.25     Types: Cigarettes    Smokeless tobacco: Never Used      Comment: Patient current vapes    Alcohol use No      Comment: Rarely. Current Outpatient Prescriptions   Medication Sig Dispense Refill    miconazole (ZEASORB-AF) 2 % powder Apply topically 2 times daily. 45 g 5    cetirizine (ZYRTEC ALLERGY) 10 MG tablet Take 1 tablet by mouth daily 30 tablet 5    etonogestrel-ethinyl estradiol (NUVARING) 0.12-0.015 MG/24HR vaginal ring Place 1 each vaginally every 21 days Insert one (1) ring vaginally and leave in place for three (3) weeks, then remove for one (1) week. 11 each 0    montelukast (SINGULAIR) 10 MG tablet Take 1 tablet by mouth nightly 30 tablet 3    ranitidine (ZANTAC) 150 MG tablet Take 1 tablet by mouth nightly 30 tablet 3    ciclopirox (LOPROX) 0.77 % cream Apply topically 2 times daily. 30 g 3    amphetamine-dextroamphetamine (ADDERALL XR) 20 MG extended release capsule Take 1 capsule by mouth daily . 30 capsule 0    amphetamine-dextroamphetamine (ADDERALL, 5MG,) 5 MG tablet Take 1 tablet by mouth daily . 30 tablet 0    amphetamine-dextroamphetamine (ADDERALL XR) 20 MG extended release capsule Take 1 capsule by mouth every morning . 30 capsule 0    amphetamine-dextroamphetamine (ADDERALL, 5MG,) 5 MG tablet Take 1 tablet by mouth daily . 30 tablet 0    nystatin-triamcinolone (MYCOLOG II) 738660-3.6 UNIT/GM-% cream Apply topically 2 times daily. 30 g 2    gabapentin (NEURONTIN) 300 MG capsule TID 90 capsule 3    Iodoquinol-HC (HYDROCORTISONE-IODOQUINOL) 1-1 % CREA Apply 1 Units topically 4 times daily 60 g 0     Current Facility-Administered Medications   Medication Dose Route Frequency Provider Last Rate Last Dose    diphenhydrAMINE (BENADRYL) capsule 50 mg  50 mg Oral Nightly PRN Finesse Moise NP         Allergies   Allergen Reactions    Seasonal Other (See Comments)     Watery Eyes, Sneezing.        Health Maintenance   Topic Date Due    DTaP/Tdap/Td vaccine (1 - Tdap) 01/24/2013    Pneumococcal med risk (1 of 1 - PPSV23) 01/24/2013    Flu vaccine (1) 09/01/2017    HIV screen  06/26/2018 (Originally 1/24/2009)    Chlamydia screen  06/26/2018 (Originally 1/24/2010)    Cervical cancer screen  12/18/2018 (Originally 1/24/2015)          Review of Systems   Constitutional: Negative. HENT: Negative. Cardiovascular: Negative. Negative for chest pain. Gastrointestinal: Negative. Neurological: Negative for headaches. Psychiatric/Behavioral: Positive for decreased concentration. Negative for sleep disturbance. Objective:     /70 (Site: Left Arm, Position: Sitting, Cuff Size: Small Adult)   Pulse 72   Temp 97.7 °F (36.5 °C) (Oral)   Wt 153 lb (69.4 kg)   SpO2 99%   Breastfeeding? No   BMI 26.25 kg/m²   Physical Exam   Constitutional: She is oriented to person, place, and time. She appears well-developed and well-nourished. HENT:   Head: Normocephalic. Eyes: Conjunctivae are normal.   Cardiovascular: Normal rate and regular rhythm. Pulmonary/Chest: Effort normal and breath sounds normal.   Neurological: She is alert and oriented to person, place, and time. Skin: Skin is warm and dry. Psychiatric: She has a normal mood and affect. Her behavior is normal. Judgment and thought content normal.         Assessment:      1. Attention deficit hyperactivity disorder (ADHD), predominantly inattentive type    2. Fatigue, unspecified type    3. Screening for hyperlipidemia    4. Long-term use of high-risk medication           Plan:    I have asked that [facility] call [facility] and Estella Redd with their phone numbers. Discussion that psychiatry would be the proper place for these to be prescribed. She voices understanding .    Orders Placed This Encounter   Procedures    CBC     Standing Status:   Future     Standing Expiration Date:   6/19/2018    Comprehensive Metabolic Panel     Standing Status:   Future     Standing Expiration Date:   3/19/2019    Lipid Panel     Standing

## 2018-03-19 NOTE — PATIENT INSTRUCTIONS
are trying to quit smoking. · Consider signing up for a smoking cessation program, such as the American Lung Association's Freedom from Smoking program.  · Set a quit date. Pick your date carefully so that it is not right in the middle of a big deadline or stressful time. Once you quit, do not even take a puff. Get rid of all ashtrays and lighters after your last cigarette. Clean your house and your clothes so that they do not smell of smoke. · Learn how to be a nonsmoker. Think about ways you can avoid those things that make you reach for a cigarette. ¨ Avoid situations that put you at greatest risk for smoking. For some people, it is hard to have a drink with friends without smoking. For others, they might skip a coffee break with coworkers who smoke. ¨ Change your daily routine. Take a different route to work or eat a meal in a different place. · Cut down on stress. Calm yourself or release tension by doing an activity you enjoy, such as reading a book, taking a hot bath, or gardening. · Talk to your doctor or pharmacist about nicotine replacement therapy, which replaces the nicotine in your body. You still get nicotine but you do not use tobacco. Nicotine replacement products help you slowly reduce the amount of nicotine you need. These products come in several forms, many of them available over-the-counter:  ¨ Nicotine patches  ¨ Nicotine gum and lozenges  ¨ Nicotine inhaler  · Ask your doctor about bupropion (Wellbutrin) or varenicline (Chantix), which are prescription medicines. They do not contain nicotine. They help you by reducing withdrawal symptoms, such as stress and anxiety. · Some people find hypnosis, acupuncture, and massage helpful for ending the smoking habit. · Eat a healthy diet and get regular exercise. Having healthy habits will help your body move past its craving for nicotine. · Be prepared to keep trying. Most people are not successful the first few times they try to quit.  Do not get mad at yourself if you smoke again. Make a list of things you learned and think about when you want to try again, such as next week, next month, or next year. Where can you learn more? Go to https://[email or web address]°david.[employer]. org and sign in to your [employer] account. Enter R651 in the [employer] box to learn more about \"Stopping Smoking: Care Instructions. \"     If you do not have an account, please click on the \"Sign Up Now\" link. Current as of: March 20, 2017  Content Version: 11.5  © 8943-1310 Healthwise, Incorporated. Care instructions adapted under license by [facility] ([facility]). If you have questions about a medical condition or this instruction, always ask your healthcare professional. [address] any warranty or liability for your use of this information.

## 2019-01-18 ENCOUNTER — TELEPHONE (OUTPATIENT)
Dept: FAMILY MEDICINE CLINIC | Age: 24
End: 2019-01-18

## 2019-08-29 RX ORDER — ETONOGESTREL/ETHINYL ESTRADIOL .12-.015MG
RING, VAGINAL VAGINAL
Qty: 1 EACH | Refills: 0 | Status: SHIPPED | OUTPATIENT
Start: 2019-08-29 | End: 2021-07-08

## 2019-11-13 ENCOUNTER — OFFICE VISIT (OUTPATIENT)
Dept: FAMILY MEDICINE CLINIC | Age: 24
End: 2019-11-13
Payer: MEDICARE

## 2019-11-13 VITALS
HEART RATE: 82 BPM | SYSTOLIC BLOOD PRESSURE: 136 MMHG | DIASTOLIC BLOOD PRESSURE: 82 MMHG | WEIGHT: 207 LBS | OXYGEN SATURATION: 99 % | HEIGHT: 64 IN | BODY MASS INDEX: 35.34 KG/M2

## 2019-11-13 DIAGNOSIS — R73.02 IMPAIRED GLUCOSE TOLERANCE: ICD-10-CM

## 2019-11-13 DIAGNOSIS — R63.5 WEIGHT GAIN: Primary | ICD-10-CM

## 2019-11-13 DIAGNOSIS — L65.9 HAIR LOSS: ICD-10-CM

## 2019-11-13 DIAGNOSIS — Z13.0 SCREENING FOR DEFICIENCY ANEMIA: ICD-10-CM

## 2019-11-13 DIAGNOSIS — H53.8 BLURRY VISION: ICD-10-CM

## 2019-11-13 PROBLEM — B00.9 HERPES SIMPLEX: Status: ACTIVE | Noted: 2019-11-13

## 2019-11-13 PROBLEM — O28.9 ABNORMAL FINDINGS ON ANTENATAL SCREENING OF MOTHER: Status: ACTIVE | Noted: 2019-11-13

## 2019-11-13 PROBLEM — O99.810 IMPAIRED GLUCOSE TOLERANCE IN PREGNANCY: Status: ACTIVE | Noted: 2019-11-13

## 2019-11-13 PROBLEM — O20.0 THREATENED MISCARRIAGE: Status: ACTIVE | Noted: 2019-11-13

## 2019-11-13 PROCEDURE — G8427 DOCREV CUR MEDS BY ELIG CLIN: HCPCS | Performed by: NURSE PRACTITIONER

## 2019-11-13 PROCEDURE — G8484 FLU IMMUNIZE NO ADMIN: HCPCS | Performed by: NURSE PRACTITIONER

## 2019-11-13 PROCEDURE — 99214 OFFICE O/P EST MOD 30 MIN: CPT | Performed by: NURSE PRACTITIONER

## 2019-11-13 PROCEDURE — G8419 CALC BMI OUT NRM PARAM NOF/U: HCPCS | Performed by: NURSE PRACTITIONER

## 2019-11-13 PROCEDURE — 4004F PT TOBACCO SCREEN RCVD TLK: CPT | Performed by: NURSE PRACTITIONER

## 2019-11-13 RX ORDER — VALACYCLOVIR HYDROCHLORIDE 500 MG/1
TABLET, FILM COATED ORAL
COMMUNITY
End: 2021-07-08

## 2019-11-13 ASSESSMENT — PATIENT HEALTH QUESTIONNAIRE - PHQ9
1. LITTLE INTEREST OR PLEASURE IN DOING THINGS: 0
SUM OF ALL RESPONSES TO PHQ QUESTIONS 1-9: 0
SUM OF ALL RESPONSES TO PHQ9 QUESTIONS 1 & 2: 0
2. FEELING DOWN, DEPRESSED OR HOPELESS: 0
SUM OF ALL RESPONSES TO PHQ QUESTIONS 1-9: 0

## 2019-11-14 ASSESSMENT — ENCOUNTER SYMPTOMS
COUGH: 0
DIARRHEA: 1
RESPIRATORY NEGATIVE: 1
CONSTIPATION: 0

## 2019-11-18 ENCOUNTER — HOSPITAL ENCOUNTER (OUTPATIENT)
Age: 24
Setting detail: SPECIMEN
Discharge: HOME | End: 2019-11-18
Payer: MEDICARE

## 2019-11-18 DIAGNOSIS — R73.02 IMPAIRED GLUCOSE TOLERANCE: ICD-10-CM

## 2019-11-18 DIAGNOSIS — Z13.0 SCREENING FOR DEFICIENCY ANEMIA: ICD-10-CM

## 2019-11-18 DIAGNOSIS — L65.9 HAIR LOSS: ICD-10-CM

## 2019-11-18 DIAGNOSIS — R63.5 WEIGHT GAIN: ICD-10-CM

## 2019-11-18 LAB
ANION GAP SERPL CALCULATED.3IONS-SCNC: 10 MMOL/L (ref 9–17)
BUN BLDV-MCNC: 15 MG/DL (ref 6–20)
BUN/CREAT BLD: NORMAL (ref 9–20)
CALCIUM SERPL-MCNC: 9 MG/DL (ref 8.6–10.4)
CHLORIDE BLD-SCNC: 101 MMOL/L (ref 98–107)
CO2: 27 MMOL/L (ref 20–31)
CREAT SERPL-MCNC: 0.76 MG/DL (ref 0.5–0.9)
GFR AFRICAN AMERICAN: >60 ML/MIN
GFR NON-AFRICAN AMERICAN: >60 ML/MIN
GFR SERPL CREATININE-BSD FRML MDRD: NORMAL ML/MIN/{1.73_M2}
GFR SERPL CREATININE-BSD FRML MDRD: NORMAL ML/MIN/{1.73_M2}
GLUCOSE BLD-MCNC: 99 MG/DL (ref 70–99)
HCT VFR BLD CALC: 42.4 % (ref 36.3–47.1)
HEMOGLOBIN: 12.8 G/DL (ref 11.9–15.1)
MCH RBC QN AUTO: 26.4 PG (ref 25.2–33.5)
MCHC RBC AUTO-ENTMCNC: 30.2 G/DL (ref 28.4–34.8)
MCV RBC AUTO: 87.6 FL (ref 82.6–102.9)
NRBC AUTOMATED: 0 PER 100 WBC
PDW BLD-RTO: 12.6 % (ref 11.8–14.4)
PLATELET # BLD: 246 K/UL (ref 138–453)
PMV BLD AUTO: 11.8 FL (ref 8.1–13.5)
POTASSIUM SERPL-SCNC: 4.6 MMOL/L (ref 3.7–5.3)
RBC # BLD: 4.84 M/UL (ref 3.95–5.11)
SODIUM BLD-SCNC: 138 MMOL/L (ref 135–144)
T3 FREE: 3.92 PG/ML (ref 2.02–4.43)
THYROXINE, FREE: 1.31 NG/DL (ref 0.93–1.7)
TSH SERPL DL<=0.05 MIU/L-ACNC: 1.12 MIU/L (ref 0.3–5)
WBC # BLD: 5.4 K/UL (ref 3.5–11.3)

## 2019-11-20 ENCOUNTER — HOSPITAL ENCOUNTER (EMERGENCY)
Age: 24
Discharge: HOME | End: 2019-11-20
Attending: EMERGENCY MEDICINE
Payer: MEDICARE

## 2019-11-20 ENCOUNTER — APPOINTMENT (OUTPATIENT)
Dept: CT IMAGING | Age: 24
End: 2019-11-20
Payer: MEDICARE

## 2019-11-20 VITALS
DIASTOLIC BLOOD PRESSURE: 75 MMHG | RESPIRATION RATE: 14 BRPM | WEIGHT: 209.19 LBS | BODY MASS INDEX: 35.71 KG/M2 | SYSTOLIC BLOOD PRESSURE: 150 MMHG | HEIGHT: 64 IN | TEMPERATURE: 97.5 F | OXYGEN SATURATION: 96 % | HEART RATE: 73 BPM

## 2019-11-20 DIAGNOSIS — H53.8 BLURRED VISION, LEFT EYE: Primary | ICD-10-CM

## 2019-11-20 PROCEDURE — 70450 CT HEAD/BRAIN W/O DYE: CPT

## 2019-11-20 PROCEDURE — 99283 EMERGENCY DEPT VISIT LOW MDM: CPT

## 2019-11-20 ASSESSMENT — ENCOUNTER SYMPTOMS
CONSTIPATION: 0
COUGH: 0
ABDOMINAL PAIN: 0
DIARRHEA: 0
COLOR CHANGE: 0
VOMITING: 0
EYE PAIN: 1
EYE REDNESS: 1
FACIAL SWELLING: 0
EYE DISCHARGE: 0
SHORTNESS OF BREATH: 0

## 2019-11-20 ASSESSMENT — PAIN DESCRIPTION - PAIN TYPE: TYPE: ACUTE PAIN

## 2019-11-20 ASSESSMENT — PAIN SCALES - GENERAL: PAINLEVEL_OUTOF10: 7

## 2020-02-21 ENCOUNTER — TELEPHONE (OUTPATIENT)
Dept: FAMILY MEDICINE CLINIC | Age: 25
End: 2020-02-21

## 2020-02-21 DIAGNOSIS — B00.52: Primary | ICD-10-CM

## 2020-02-27 PROBLEM — B00.52: Status: ACTIVE | Noted: 2020-02-27

## 2021-07-06 ENCOUNTER — HOSPITAL ENCOUNTER (EMERGENCY)
Age: 26
Discharge: HOME | End: 2021-07-06
Attending: EMERGENCY MEDICINE
Payer: COMMERCIAL

## 2021-07-06 ENCOUNTER — NURSE TRIAGE (OUTPATIENT)
Dept: OTHER | Facility: CLINIC | Age: 26
End: 2021-07-06

## 2021-07-06 VITALS
BODY MASS INDEX: 41.2 KG/M2 | RESPIRATION RATE: 16 BRPM | HEART RATE: 105 BPM | OXYGEN SATURATION: 97 % | WEIGHT: 240 LBS | DIASTOLIC BLOOD PRESSURE: 83 MMHG | SYSTOLIC BLOOD PRESSURE: 140 MMHG | TEMPERATURE: 99 F

## 2021-07-06 DIAGNOSIS — M54.16 LUMBAR RADICULOPATHY: Primary | ICD-10-CM

## 2021-07-06 LAB
ABSOLUTE EOS #: 0.04 K/UL (ref 0–0.44)
ABSOLUTE IMMATURE GRANULOCYTE: 0.04 K/UL (ref 0–0.3)
ABSOLUTE LYMPH #: 2.48 K/UL (ref 1.1–3.7)
ABSOLUTE MONO #: 0.5 K/UL (ref 0.1–1.2)
ANION GAP SERPL CALCULATED.3IONS-SCNC: 14 MMOL/L (ref 9–17)
BASOPHILS # BLD: 0 % (ref 0–2)
BASOPHILS ABSOLUTE: 0.03 K/UL (ref 0–0.2)
BUN BLDV-MCNC: 11 MG/DL (ref 6–20)
BUN/CREAT BLD: 12 (ref 9–20)
CALCIUM SERPL-MCNC: 9.2 MG/DL (ref 8.6–10.4)
CHLORIDE BLD-SCNC: 102 MMOL/L (ref 98–107)
CO2: 21 MMOL/L (ref 20–31)
CREAT SERPL-MCNC: 0.95 MG/DL (ref 0.5–0.9)
D-DIMER QUANTITATIVE: <0.27 MG/L FEU (ref 0–0.59)
DIFFERENTIAL TYPE: ABNORMAL
EOSINOPHILS RELATIVE PERCENT: 0 % (ref 1–4)
GFR AFRICAN AMERICAN: >60 ML/MIN
GFR NON-AFRICAN AMERICAN: >60 ML/MIN
GFR SERPL CREATININE-BSD FRML MDRD: ABNORMAL ML/MIN/{1.73_M2}
GFR SERPL CREATININE-BSD FRML MDRD: ABNORMAL ML/MIN/{1.73_M2}
GLUCOSE BLD-MCNC: 96 MG/DL (ref 70–99)
HCG QUALITATIVE: NEGATIVE
HCT VFR BLD CALC: 43.5 % (ref 36.3–47.1)
HEMOGLOBIN: 13.6 G/DL (ref 11.9–15.1)
IMMATURE GRANULOCYTES: 0 %
LYMPHOCYTES # BLD: 22 % (ref 24–43)
MCH RBC QN AUTO: 26 PG (ref 25.2–33.5)
MCHC RBC AUTO-ENTMCNC: 31.3 G/DL (ref 28.4–34.8)
MCV RBC AUTO: 83.2 FL (ref 82.6–102.9)
MONOCYTES # BLD: 4 % (ref 3–12)
NRBC AUTOMATED: 0 PER 100 WBC
PDW BLD-RTO: 13.4 % (ref 11.8–14.4)
PLATELET # BLD: 255 K/UL (ref 138–453)
PLATELET ESTIMATE: ABNORMAL
PMV BLD AUTO: 10.7 FL (ref 8.1–13.5)
POTASSIUM SERPL-SCNC: 3.7 MMOL/L (ref 3.7–5.3)
RBC # BLD: 5.23 M/UL (ref 3.95–5.11)
RBC # BLD: ABNORMAL 10*6/UL
SEG NEUTROPHILS: 74 % (ref 36–65)
SEGMENTED NEUTROPHILS ABSOLUTE COUNT: 8.27 K/UL (ref 1.5–8.1)
SODIUM BLD-SCNC: 137 MMOL/L (ref 135–144)
WBC # BLD: 11.4 K/UL (ref 3.5–11.3)
WBC # BLD: ABNORMAL 10*3/UL

## 2021-07-06 PROCEDURE — 85025 COMPLETE CBC W/AUTO DIFF WBC: CPT

## 2021-07-06 PROCEDURE — 85379 FIBRIN DEGRADATION QUANT: CPT

## 2021-07-06 PROCEDURE — 99283 EMERGENCY DEPT VISIT LOW MDM: CPT

## 2021-07-06 PROCEDURE — 80048 BASIC METABOLIC PNL TOTAL CA: CPT

## 2021-07-06 PROCEDURE — 84703 CHORIONIC GONADOTROPIN ASSAY: CPT

## 2021-07-06 RX ORDER — PREDNISONE 10 MG/1
TABLET ORAL
Qty: 30 TABLET | Refills: 0 | Status: SHIPPED | OUTPATIENT
Start: 2021-07-06 | End: 2021-07-08 | Stop reason: ALTCHOICE

## 2021-07-06 RX ORDER — HYDROCODONE BITARTRATE AND ACETAMINOPHEN 5; 325 MG/1; MG/1
1 TABLET ORAL EVERY 8 HOURS PRN
Qty: 20 TABLET | Refills: 0 | Status: SHIPPED | OUTPATIENT
Start: 2021-07-06 | End: 2021-07-08 | Stop reason: ALTCHOICE

## 2021-07-06 ASSESSMENT — ENCOUNTER SYMPTOMS
SHORTNESS OF BREATH: 0
ABDOMINAL PAIN: 0
BACK PAIN: 1
COLOR CHANGE: 0

## 2021-07-06 ASSESSMENT — PAIN SCALES - GENERAL: PAINLEVEL_OUTOF10: 2

## 2021-07-06 ASSESSMENT — PAIN DESCRIPTION - LOCATION: LOCATION: LEG

## 2021-07-06 ASSESSMENT — PAIN DESCRIPTION - FREQUENCY: FREQUENCY: CONTINUOUS

## 2021-07-06 ASSESSMENT — PAIN DESCRIPTION - ORIENTATION: ORIENTATION: POSTERIOR;LOWER

## 2021-07-06 ASSESSMENT — PAIN DESCRIPTION - DESCRIPTORS: DESCRIPTORS: ACHING;CRAMPING;DULL

## 2021-07-06 NOTE — ED PROVIDER NOTES
[facility] ED  EMERGENCY DEPARTMENT ENCOUNTER   ATTENDING ATTESTATION     Pt Name: Blu Summers  MRN: [id number]  Veliagfmariela 1995  Date of evaluation: 7/6/21       Blu Summers is a 31 y.o. female who presents with Leg Swelling (sent by  )      MDM:     69-year-old female, chronic pain and discomfort in the right leg, D-dimer negative, I believe this is lumbar radiculopathy. We will treat as such with steroids pain control outpatient follow-up with PCP and return if symptoms worsen or change. Vitals:   Vitals:    07/06/21 1540   BP: (!) 140/83   Pulse: 105   Resp: 16   Temp: 99 °F (37.2 °C)   TempSrc: Oral   SpO2: 97%   Weight: 240 lb (108.9 kg)         I personally evaluated and examined the patient in conjunction with the Midlevel provider and agree with the assessment, treatment plan, and disposition of the patient as recorded by the midlevel. I performed a history and physical examination of the patient and discussed management with the midlevel. I reviewed the midlevels note and agree with the documented findings and plan of care. Any areas of disagreement are noted on the chart. I was personally present for the key portions of any procedures. I have documented in the chart those procedures where I was not present during the key portions. I have personally reviewed all images and agree with the midlevel's interpretation. I have reviewed the emergency nurses triage note. I agree with the chief complaint, past medical history, past surgical history, allergies, medications, social and family history as documented unless otherwise noted.     Leo Arvziu MD  Attending Emergency  Physician                  Everardo Ibarra MD  07/06/21 2686

## 2021-07-06 NOTE — ED PROVIDER NOTES
[address] ED  eMERGENCY dEPARTMENT eNCOUnter      Pt Name: Maxine Duffy  MRN: [id number]  Kellie 1995  Date of evaluation: 7/6/2021  Provider: BRIANNE Sommer 0949       Chief Complaint   Patient presents with    Leg Swelling     sent by dr Maria Esther Frank  (Location/Symptom, Timing/Onset, Context/Setting, Quality, Duration, Modifying Factors, Severity.)   Maxine Duffy is a 31 y.o. female who presents to the emergency department via private auto for pain, swelling to her right lower leg. Onset was 3 months ago. It has been getting worse. The pain is to the anterior, lateral, and posterior lower leg. Rates her pain 2/10 at this time. She has a history of chronic back pain. There is pain to the upper leg with ambulation. There is a chance of pregnancy. Nursing Notes were reviewed. ALLERGIES     Seasonal    CURRENT MEDICATIONS       Previous Medications    AMPHETAMINE-DEXTROAMPHETAMINE (ADDERALL XR) 20 MG EXTENDED RELEASE CAPSULE    Take 1 capsule by mouth daily . AMPHETAMINE-DEXTROAMPHETAMINE (ADDERALL XR) 20 MG EXTENDED RELEASE CAPSULE    Take 1 capsule by mouth every morning . AMPHETAMINE-DEXTROAMPHETAMINE (ADDERALL, 5MG,) 5 MG TABLET    Take 1 tablet by mouth daily . AMPHETAMINE-DEXTROAMPHETAMINE (ADDERALL, 5MG,) 5 MG TABLET    Take 1 tablet by mouth daily . CETIRIZINE (ZYRTEC ALLERGY) 10 MG TABLET    Take 1 tablet by mouth daily    CICLOPIROX (LOPROX) 0.77 % CREAM    Apply topically 2 times daily. GABAPENTIN (NEURONTIN) 300 MG CAPSULE    TID    IODOQUINOL-HC (HYDROCORTISONE-IODOQUINOL) 1-1 % CREA    Apply 1 Units topically 4 times daily    MICONAZOLE (ZEASORB-AF) 2 % POWDER    Apply topically 2 times daily.     MONTELUKAST (SINGULAIR) 10 MG TABLET    Take 1 tablet by mouth nightly    NUVARING 0.12-0.015 MG/24HR VAGINAL RING    insert 1 ring vaginally for 3 weeks REMOVE for 1 week and repeat    NYSTATIN-TRIAMCINOLONE (Mandy Ravalli II) 642556-0.1 UNIT/GM-% CREAM    Apply topically 2 times daily. RANITIDINE (ZANTAC) 150 MG TABLET    Take 1 tablet by mouth nightly    VALACYCLOVIR (VALTREX) 500 MG TABLET    valacyclovir 500 mg tablet       PAST MEDICAL HISTORY         Diagnosis Date    Attention deficit hyperactivity disorder (ADHD), predominantly inattentive type 10/20/2016    Chronic back pain     Obesity     Ovarian cyst, bilateral     see's gynecology, Dr Sierra Danielson Seasonal allergic rhinitis 2017       SURGICAL HISTORY           Procedure Laterality Date    TEAR DUCT SURGERY Bilateral age 21 months   Ellen Curl TONSILLECTOMY AND ADENOIDECTOMY  age 10         FAMILY HISTORY           Problem Relation Age of Onset    Anxiety Disorder Mother     Depression Mother     Bipolar Disorder Sister      Family Status   Relation Name Status    Mother  Alive    Sister  Alive        1/2 sister    Father  Alive        unknown status, no contact    Brother  Alive        1/2 brother   Ellen Curl MGM  Alive    MGF  Alive    PGM  Alive    PGF   at age Unknown        U/O    Sister  Alive        1/2 sister    Sister  Alive        /2 sister    Sister  Alive        /2 sister        SOCIAL HISTORY      reports that she has been smoking cigarettes. She has been smoking about 0.25 packs per day. She has never used smokeless tobacco. She reports that she does not drink alcohol and does not use drugs. REVIEW OF SYSTEMS    (2-9 systems for level 4, 10 or more for level 5)     Review of Systems   Constitutional: Negative for chills, diaphoresis, fatigue and fever. Respiratory: Negative for shortness of breath. Cardiovascular: Negative for chest pain. Gastrointestinal: Negative for abdominal pain. Musculoskeletal: Positive for back pain and myalgias. Negative for arthralgias. Skin: Negative for color change, rash and wound. Neurological: Negative for weakness and numbness.      Except as noted above the remainder of the review of systems was reviewed and negative. PHYSICAL EXAM    (up to 7 for level 4, 8 or more for level 5)     ED Triage Vitals [07/06/21 1540]   BP Temp Temp Source Pulse Resp SpO2 Height Weight   (!) 140/83 99 °F (37.2 °C) Oral 105 16 97 % -- 240 lb (108.9 kg)     Physical Exam  Vitals reviewed. Constitutional:       General: She is not in acute distress. Appearance: She is well-developed. She is not diaphoretic. Eyes:      General: No scleral icterus. Conjunctiva/sclera: Conjunctivae normal.   Cardiovascular:      Rate and Rhythm: Normal rate. Pulses: Normal pulses. Pulmonary:      Effort: Pulmonary effort is normal. No respiratory distress. Breath sounds: No stridor. Musculoskeletal:      Right lower leg: Tenderness present. No deformity or bony tenderness. No edema. Left lower leg: No edema. Right foot: Normal capillary refill. Normal pulse. Skin:     General: Skin is warm and dry. Capillary Refill: Capillary refill takes less than 2 seconds. Findings: No rash. Neurological:      Mental Status: She is alert and oriented to person, place, and time. Psychiatric:         Behavior: Behavior normal.           DIAGNOSTIC RESULTS       LABS:  Labs Reviewed   CBC WITH AUTO DIFFERENTIAL - Abnormal; Notable for the following components:       Result Value    WBC 11.4 (*)     RBC 5.23 (*)     Seg Neutrophils 74 (*)     Lymphocytes 22 (*)     Eosinophils % 0 (*)     Segs Absolute 8.27 (*)     All other components within normal limits   BASIC METABOLIC PANEL - Abnormal; Notable for the following components:    CREATININE 0.95 (*)     All other components within normal limits   HCG, SERUM, QUALITATIVE   D-DIMER, QUANTITATIVE       All other labs were within normal range or not returned as of this dictation.     EMERGENCY DEPARTMENT COURSE and DIFFERENTIAL DIAGNOSIS/MDM:   Vitals:    Vitals:    07/06/21 1540   BP: (!) 140/83   Pulse: 105   Resp: 16   Temp: 99 °F (37.2 °C)   TempSrc: Oral SpO2: 97%   Weight: 240 lb (108.9 kg)       CLINICAL DECISION MAKING:  The patient presented alert with a nontoxic appearance and was seen in conjunction with Dr. Hortensia Ocampo. Laboratory studies were unremarkable, including d-dimer. She does have a history of chronic back pain. OARRS was reviewed. Prescriptions were written for prednisone and norco. The patient was advised to not drink alcohol, drive, or operate heavy machinery while taking the norco. Follow up with pcp for further evaluation and treatment, return to ED if condition worsens. FINAL IMPRESSION      1. Lumbar radiculopathy            Problem List  Patient Active Problem List   Diagnosis Code    Low TAE-A O35. 8XX0    Back pain M54.9    Tattoo of skin L81.8    Multiple body piercings Z78.9    Hair loss L65.9    Fatigue R53.83    Attention deficit hyperactivity disorder (ADHD), predominantly inattentive type F90.0    Candidal intertrigo C65.8    Folliculitis T45.6    Chronic midline thoracic back pain M54.6, G89.29    Chronic midline low back pain without sciatica M54.5, G89.29    Urticaria L50.9    Pruritic intertrigo L30.4    Seasonal allergic rhinitis J30.2    Herpes zoster without complication B52.7    Encounter for surveillance of vaginal ring hormonal contraceptive device Z30.44    Abnormal findings on  screening of mother O31.11    Herpes simplex B00.9    Impaired glucose tolerance in pregnancy O99.810    Threatened miscarriage O20.0    Herpes simplex virus (HSV) stromal keratitis of both eyes B00.52         DISPOSITION/PLAN   DISPOSITION  DISCHARGE      PATIENT REFERRED TO:   Rena Padron, APRN - CNP  [address] [facility] [address] [phone]    Schedule an appointment as soon as possible for a visit       [facility] ED  [address] [facility]  [phone]    As needed, If symptoms worsen      DISCHARGE MEDICATIONS:     New Prescriptions    HYDROCODONE-ACETAMINOPHEN (NORCO) 5-325 MG PER TABLET    Take 1 tablet by mouth every 8 hours as needed for Pain for up to 7 days. PREDNISONE (DELTASONE) 10 MG TABLET    Take five tablets on days 1-2, four tablets on days 3-4, three tablets on days 5-6, two tablets on day 7-8, one tablet on days 9-10.            (Please note that portions of this note were completed with a voice recognition program.  Efforts were made to edit the dictations but occasionally words are mis-transcribed.)    BRIANNE Hurd CNP, APRN - CNP  07/06/21 [id number]

## 2021-07-06 NOTE — TELEPHONE ENCOUNTER
Received call from [address] at . . (BILL) [facility] with [employer] Complaint. Brief description of triage: right leg pain with numbness and tingling to feet calf swelling to right leg only     Triage indicates for patient to go to ED/[facility] or to PCP office with approval this nurse is calling PCP office to get approval at this time 10:09am. [address] whom advised she is not with the [facility] office and gave number to another office that she is located in calling this office now and no available appointments to see any providers in the office, advised to go to [facility]/ED for possible blood clot. This nurse encouraged patient to follow care advise to go to ED/[facility] now however she is at work and advised she can not go until after work this nurse informmed her of the importance of getting medical attention as soon as possible if it is a blood clot and it dislodges and travels it can cause serious complications and or death caller acknowledged understanding. Care advice provided, patient verbalizes understanding; denies any other questions or concerns; instructed to call back for any new or worsening symptoms. Attention Provider: Thank you for allowing me to participate in the care of your patient. The patient was connected to triage in response to information provided to the ECC. Please do not respond through this encounter as the response is not directed to a shared pool. Reason for Disposition   Thigh, calf, or ankle swelling in only one leg    Answer Assessment - Initial Assessment Questions  1. ONSET: \"When did the pain start? \"       Right leg pain started three months ago     2. LOCATION: \"Where is the pain located? \"       Right leg going up leg she does have polycystic so she has pains it is not constant like maybe she pulled a muscle then it started to hurt when she walks but just a 5-6 out of 10 is the most started to front of shin and went to right side outer calf and she has a luciano back there to the calf and it swells sometimes and starts to go up into thigh and knee when waling     3. PAIN: \"How bad is the pain? \"    (Scale 1-10; or mild, moderate, severe)    -  MILD (1-3): doesn't interfere with normal activities     -  MODERATE (4-7): interferes with normal activities (e.g., work or school) or awakens from sleep, limping     -  SEVERE (8-10): excruciating pain, unable to do any normal activities, unable to walk      Yes it is a 3 out of 10 she went to a water park and was in the hot tub so it will maybe start hurting tomorrow     4. WORK OR EXERCISE: \"Has there been any recent work or exercise that involved this part of the body? \"       Walking causes it to worsen     5. CAUSE: \"What do you think is causing the leg pain? \"      Not sure     6. OTHER SYMPTOMS: \"Do you have any other symptoms? \" (e.g., chest pain, back pain, breathing difficulty, swelling, rash, fever, numbness, weakness)      Always has back pain over the weekend noted sharp pain in chest 2-3 times last a second then was fine, noted swelling to calf, has been running out of breath more often easily no rash, no numbness but weakness occasionally especially at the end of the week due to her job is physically involved   7. PREGNANCY: \"Is there any chance you are pregnant? \" \"When was your last menstrual period? \"      possibility    Protocols used: LEG PAIN-ADULT-[place]

## 2021-07-06 NOTE — ED TRIAGE NOTES
Pt to ED, sent by PCP, for chronic R leg pain and swelling [id number] and chest pain. Pt AOx4. Pt denies cardiac history. Pt does not appear to be in distress. Patent airway, no dyspnea or cyanosis noted.

## 2021-07-07 ENCOUNTER — TELEPHONE (OUTPATIENT)
Dept: FAMILY MEDICINE CLINIC | Age: 26
End: 2021-07-07

## 2021-07-07 NOTE — TELEPHONE ENCOUNTER
[facility]) ED Follow up Call    Reason for ED visit:           Rosalba Reid , this is Yue Bryson from Dr. Ede Chen office, just calling to see how you are doing after your recent ED visit. Did you receive discharge instructions? Yes  Do you understand the discharge instructions? Yes  Did the ED give you any new prescriptions? Yes  Were you able to fill your prescriptions? Yes      Do you have one of our red, yellow and green  Zone sheets that help you to determine when you should go to the ED? Yes      Do you need or want to make a follow up appt with your PCP? No    Do you have any further needs in the home i.e. Equipment?   Not Applicable        FU appts/Provider:    Future Appointments   Date Time Provider Norbert Diego   7/8/2021  3:50 PM Gabino Barnes, BRIANNE - CNP fp [facility]

## 2021-07-08 ENCOUNTER — OFFICE VISIT (OUTPATIENT)
Dept: FAMILY MEDICINE CLINIC | Age: 26
End: 2021-07-08
Payer: COMMERCIAL

## 2021-07-08 VITALS
WEIGHT: 245.6 LBS | BODY MASS INDEX: 41.93 KG/M2 | HEART RATE: 92 BPM | HEIGHT: 64 IN | SYSTOLIC BLOOD PRESSURE: 136 MMHG | OXYGEN SATURATION: 98 % | TEMPERATURE: 47.1 F | DIASTOLIC BLOOD PRESSURE: 84 MMHG

## 2021-07-08 DIAGNOSIS — R53.83 FATIGUE, UNSPECIFIED TYPE: ICD-10-CM

## 2021-07-08 DIAGNOSIS — M54.50 LUMBAR PAIN: Primary | ICD-10-CM

## 2021-07-08 DIAGNOSIS — R20.0 NUMBNESS AND TINGLING OF BOTH FEET: ICD-10-CM

## 2021-07-08 DIAGNOSIS — R22.42 MASS OF LEFT LOWER LEG: ICD-10-CM

## 2021-07-08 DIAGNOSIS — R63.5 WEIGHT GAIN: ICD-10-CM

## 2021-07-08 DIAGNOSIS — R20.2 NUMBNESS AND TINGLING OF BOTH FEET: ICD-10-CM

## 2021-07-08 PROCEDURE — 99214 OFFICE O/P EST MOD 30 MIN: CPT | Performed by: NURSE PRACTITIONER

## 2021-07-08 SDOH — ECONOMIC STABILITY: FOOD INSECURITY: WITHIN THE PAST 12 MONTHS, YOU WORRIED THAT YOUR FOOD WOULD RUN OUT BEFORE YOU GOT MONEY TO BUY MORE.: NEVER TRUE

## 2021-07-08 SDOH — ECONOMIC STABILITY: FOOD INSECURITY: WITHIN THE PAST 12 MONTHS, THE FOOD YOU BOUGHT JUST DIDN'T LAST AND YOU DIDN'T HAVE MONEY TO GET MORE.: NEVER TRUE

## 2021-07-08 ASSESSMENT — SOCIAL DETERMINANTS OF HEALTH (SDOH): HOW HARD IS IT FOR YOU TO PAY FOR THE VERY BASICS LIKE FOOD, HOUSING, MEDICAL CARE, AND HEATING?: NOT HARD AT ALL

## 2021-07-08 ASSESSMENT — PATIENT HEALTH QUESTIONNAIRE - PHQ9
SUM OF ALL RESPONSES TO PHQ9 QUESTIONS 1 & 2: 0
SUM OF ALL RESPONSES TO PHQ QUESTIONS 1-9: 0
SUM OF ALL RESPONSES TO PHQ QUESTIONS 1-9: 0
2. FEELING DOWN, DEPRESSED OR HOPELESS: 0
1. LITTLE INTEREST OR PLEASURE IN DOING THINGS: 0
SUM OF ALL RESPONSES TO PHQ QUESTIONS 1-9: 0

## 2021-07-08 ASSESSMENT — ENCOUNTER SYMPTOMS
RESPIRATORY NEGATIVE: 1
CONSTIPATION: 0
DIARRHEA: 0
COUGH: 0

## 2021-07-08 NOTE — PROGRESS NOTES
[address]  [facility].  [address] [address]  Dept: [phone]  Dept Fax: [phone]    Ilsa Martínez is a 31 y.o. female who presents today for her medical conditions/complaintsas noted below. Ilsa Martínez is c/o of   Chief Complaint   Patient presents with   [facility] ED Follow-up     leg pain         HPI:     HPI  Bharath Hennessy is here today with left leg pain. She has a \"knot\" in the right calf. She did go to the ER and there was no concern for DVT, normal Ddimer. Symptoms present for 2-3 months. No redness, no tenderness. Swelling at times. ER felt it was lumbar radic and gave steroid and NSAIDS> she has not taken the steroid. She is having N/T in her feet on and off. There is lumbar pain, no radiation through the buttocks or down the legs. Weight gain-working very physical job. Had lost large amt of weight when first doing job. She has now gained all of the weight and more. approx 100 lbs.  She   No results found for: LABA1C          ( goal A1Cis < 7)   No results found for: LABMICR  LDL Cholesterol (mg/dL)   Date Value   06/17/2016 102       (goal LDL is <100)   AST (U/L)   Date Value   06/17/2016 15     ALT (U/L)   Date Value   06/17/2016 14     BUN (mg/dL)   Date Value   07/06/2021 11     BP Readings from Last 3 Encounters:   07/08/21 136/84   07/06/21 (!) 140/83   11/20/19 (!) 150/75          (goal 120/80)    Past Medical History:   Diagnosis Date    Attention deficit hyperactivity disorder (ADHD), predominantly inattentive type 10/20/2016    Chronic back pain     Obesity     Ovarian cyst, bilateral     see's gynecology, Dr Noé Dobbs Seasonal allergic rhinitis 2/22/2017      Past Surgical History:   Procedure Laterality Date    TEAR DUCT SURGERY Bilateral age 21 months   [facility] TONSILLECTOMY AND ADENOIDECTOMY  age 10       Family History   Problem Relation Age of Onset    Anxiety Disorder Mother     Depression Mother     Bipolar Disorder Sister Social History     Tobacco Use    Smoking status: Current Every Day Smoker     Packs/day: 0.25     Types: Cigarettes    Smokeless tobacco: Never Used    Tobacco comment: Patient current vapes   Substance Use Topics    Alcohol use: No     Comment: Rarely. Current Outpatient Medications   Medication Sig Dispense Refill    amphetamine-dextroamphetamine (ADDERALL, 5MG,) 5 MG tablet Take 1 tablet by mouth daily . (Patient not taking: Reported on 11/13/2019) 30 tablet 0     Current Facility-Administered Medications   Medication Dose Route Frequency Provider Last Rate Last Admin    diphenhydrAMINE (BENADRYL) capsule 50 mg  50 mg Oral Nightly PRN Rahul Brocks, APRN - CNP         Allergies   Allergen Reactions    Seasonal Other (See Comments)     Watery Eyes, Sneezing. Health Maintenance   Topic Date Due    Hepatitis C screen  Never done    Varicella vaccine (1 of 2 - 2-dose childhood series) Never done    Pneumococcal 0-64 years Vaccine (1 of 2 - PPSV23) Never done    COVID-19 Vaccine (1) Never done    DTaP/Tdap/Td vaccine (1 - Tdap) Never done    Cervical cancer screen  Never done    Flu vaccine (1) 09/01/2021    HIV screen  Completed    Hepatitis A vaccine  Aged Out    Hepatitis B vaccine  Aged Out    Hib vaccine  Aged Out    Meningococcal (ACWY) vaccine  Aged Out       Subjective:     Review of Systems   Constitutional: Positive for unexpected weight change. HENT: Negative. Respiratory: Negative. Negative for cough. Cardiovascular: Negative. Negative for chest pain and palpitations. Gastrointestinal: Negative for constipation and diarrhea. Endocrine: Negative. Genitourinary: Negative. Musculoskeletal: Negative. Negative for arthralgias. Skin: Negative. Neurological: Negative. Psychiatric/Behavioral: Negative. Objective:     Physical Exam  Constitutional:       Appearance: Normal appearance. She is well-developed. She is obese.       Comments: obese HENT:      Head: Normocephalic. Eyes:      Conjunctiva/sclera: Conjunctivae normal.   Neck:      Thyroid: No thyromegaly. Cardiovascular:      Rate and Rhythm: Normal rate and regular rhythm. Heart sounds: Normal heart sounds. Pulmonary:      Effort: Pulmonary effort is normal.      Breath sounds: Normal breath sounds. Abdominal:      General: Bowel sounds are normal.      Palpations: Abdomen is soft. Musculoskeletal:      Cervical back: Normal range of motion. Legs:    Skin:     General: Skin is warm and dry. Findings: No rash. Neurological:      General: No focal deficit present. Mental Status: She is alert and oriented to person, place, and time. Psychiatric:         Mood and Affect: Mood normal.         Behavior: Behavior normal.         Thought Content: Thought content normal.         Judgment: Judgment normal.       /84   Pulse 92   Temp (!) 47.1 °F (8.4 °C) (Temporal)   Ht 5' 4\" (1.626 m)   Wt 245 lb 9.6 oz (111.4 kg)   LMP 06/21/2021 (Exact Date)   SpO2 98%   BMI 42.16 kg/m²     Assessment:       Diagnosis Orders   1. Lumbar pain  XR LUMBAR SPINE (2-3 VIEWS)   2. Mass of left lower leg  US EXTREMITY RIGHT NON VASC LIMITED   3. Fatigue, unspecified type  TSH   4. Weight gain  [place]   5. Numbness and tingling of both feet  Hemoglobin W1P    Basic Metabolic Panel             Plan:      No follow-ups on file. 1. Lumbar pain    - XR LUMBAR SPINE (2-3 VIEWS); Future    2. Mass of left lower leg    - US EXTREMITY RIGHT NON VASC LIMITED; Future    3. Fatigue, unspecified type    - TSH; Future    4. Weight gain    - [place]    5. Numbness and tingling of both feet    - Hemoglobin A1C; Future  - Basic Metabolic Panel;  Future      Orders Placed This Encounter   Procedures    XR LUMBAR SPINE (2-3 VIEWS)     Standing Status:   Future     Standing Expiration Date:   7/8/2022    US EXTREMITY RIGHT NON VASC LIMITED     This procedure can be scheduled via [facility]. Access your [facility] account by visiting [email or web address]. Standing Status:   Future     Standing Expiration Date:   7/8/2022    TSH     Standing Status:   Future     Standing Expiration Date:   7/8/2022    Hemoglobin A1C     Standing Status:   Future     Standing Expiration Date:   7/8/2022    Basic Metabolic Panel     Standing Status:   Future     Standing Expiration Date:   7/8/2022   [address] [place] [address]     Referral Priority:   Routine     Referral Type:   Eval and Treat     Referral Reason:   Specialty Services Required     Requested Specialty:   Nutrition     Number of Visits Requested:   1     No orders of the defined types were placed in this encounter. Patient given educational materials - see patient instructions. Discussed use, benefit, and side effects of prescribed medications. All patientquestions answered. Pt voiced understanding. Reviewed health maintenance. Instructedto continue current medications, diet and exercise. Patient agreed with treatmentplan. Follow up as directed.      Electronically signed by BRIANNE Valero CNP on 7/8/2021 at 5:48 PM

## 2021-07-09 DIAGNOSIS — R22.41 MASS OF RIGHT LOWER LEG: Primary | ICD-10-CM

## 2021-07-12 ENCOUNTER — HOSPITAL ENCOUNTER (OUTPATIENT)
Age: 26
Discharge: HOME | End: 2021-07-12
Payer: COMMERCIAL

## 2021-07-12 ENCOUNTER — HOSPITAL ENCOUNTER (OUTPATIENT)
Dept: ULTRASOUND IMAGING | Age: 26
Discharge: HOME | End: 2021-07-14
Payer: COMMERCIAL

## 2021-07-12 ENCOUNTER — HOSPITAL ENCOUNTER (OUTPATIENT)
Dept: GENERAL RADIOLOGY | Age: 26
Discharge: HOME | End: 2021-07-14
Payer: COMMERCIAL

## 2021-07-12 ENCOUNTER — HOSPITAL ENCOUNTER (OUTPATIENT)
Age: 26
Discharge: HOME | End: 2021-07-14
Payer: COMMERCIAL

## 2021-07-12 DIAGNOSIS — R20.0 NUMBNESS AND TINGLING OF BOTH FEET: ICD-10-CM

## 2021-07-12 DIAGNOSIS — M54.50 LUMBAR PAIN: ICD-10-CM

## 2021-07-12 DIAGNOSIS — R53.83 FATIGUE, UNSPECIFIED TYPE: ICD-10-CM

## 2021-07-12 DIAGNOSIS — R20.2 NUMBNESS AND TINGLING OF BOTH FEET: ICD-10-CM

## 2021-07-12 DIAGNOSIS — R22.41 MASS OF RIGHT LOWER LEG: ICD-10-CM

## 2021-07-12 LAB
ANION GAP SERPL CALCULATED.3IONS-SCNC: 13 MMOL/L (ref 9–17)
BUN BLDV-MCNC: 12 MG/DL (ref 6–20)
BUN/CREAT BLD: ABNORMAL (ref 9–20)
CALCIUM SERPL-MCNC: 8.8 MG/DL (ref 8.6–10.4)
CHLORIDE BLD-SCNC: 106 MMOL/L (ref 98–107)
CO2: 23 MMOL/L (ref 20–31)
CREAT SERPL-MCNC: 0.99 MG/DL (ref 0.5–0.9)
ESTIMATED AVERAGE GLUCOSE: 117 MG/DL
GFR AFRICAN AMERICAN: >60 ML/MIN
GFR NON-AFRICAN AMERICAN: >60 ML/MIN
GFR SERPL CREATININE-BSD FRML MDRD: ABNORMAL ML/MIN/{1.73_M2}
GFR SERPL CREATININE-BSD FRML MDRD: ABNORMAL ML/MIN/{1.73_M2}
GLUCOSE BLD-MCNC: 102 MG/DL (ref 70–99)
HBA1C MFR BLD: 5.7 % (ref 4–6)
POTASSIUM SERPL-SCNC: 4.1 MMOL/L (ref 3.7–5.3)
SODIUM BLD-SCNC: 142 MMOL/L (ref 135–144)
TSH SERPL DL<=0.05 MIU/L-ACNC: 0.88 MIU/L (ref 0.3–5)

## 2021-07-12 PROCEDURE — 76882 US LMTD JT/FCL EVL NVASC XTR: CPT

## 2021-07-12 PROCEDURE — 84443 ASSAY THYROID STIM HORMONE: CPT

## 2021-07-12 PROCEDURE — 83036 HEMOGLOBIN GLYCOSYLATED A1C: CPT

## 2021-07-12 PROCEDURE — 80048 BASIC METABOLIC PNL TOTAL CA: CPT

## 2021-07-12 PROCEDURE — 36415 COLL VENOUS BLD VENIPUNCTURE: CPT

## 2021-07-12 PROCEDURE — 72100 X-RAY EXAM L-S SPINE 2/3 VWS: CPT

## 2021-10-06 ENCOUNTER — TELEPHONE (OUTPATIENT)
Dept: FAMILY MEDICINE CLINIC | Age: 26
End: 2021-10-06

## 2021-10-06 NOTE — TELEPHONE ENCOUNTER
LM for patient to call the office to schedule a new to provider appointment after receiving FMLA/Disability Forms. Will not be filled out without the new to provider appointment.

## 2021-10-07 ENCOUNTER — TELEPHONE (OUTPATIENT)
Dept: FAMILY MEDICINE CLINIC | Age: 26
End: 2021-10-07

## 2021-10-07 ENCOUNTER — TELEMEDICINE (OUTPATIENT)
Dept: FAMILY MEDICINE CLINIC | Age: 26
End: 2021-10-07
Payer: COMMERCIAL

## 2021-10-07 DIAGNOSIS — J20.9 ACUTE BRONCHITIS DUE TO INFECTION: Primary | ICD-10-CM

## 2021-10-07 DIAGNOSIS — U07.1 COVID-19 VIRUS INFECTION: ICD-10-CM

## 2021-10-07 PROCEDURE — 99214 OFFICE O/P EST MOD 30 MIN: CPT | Performed by: FAMILY MEDICINE

## 2021-10-07 RX ORDER — AZITHROMYCIN 250 MG/1
TABLET, FILM COATED ORAL
Qty: 6 TABLET | Refills: 0 | Status: SHIPPED | OUTPATIENT
Start: 2021-10-07 | End: 2021-10-12

## 2021-10-07 RX ORDER — ALBUTEROL SULFATE 90 UG/1
2 AEROSOL, METERED RESPIRATORY (INHALATION) EVERY 6 HOURS PRN
Qty: 8 G | Refills: 0 | Status: SHIPPED | OUTPATIENT
Start: 2021-10-07 | End: 2022-06-01

## 2021-10-07 RX ORDER — ONDANSETRON 4 MG/1
4 TABLET, FILM COATED ORAL EVERY 6 HOURS PRN
Qty: 24 TABLET | Refills: 0 | Status: SHIPPED | OUTPATIENT
Start: 2021-10-07 | End: 2021-10-13

## 2021-10-07 ASSESSMENT — ENCOUNTER SYMPTOMS
SHORTNESS OF BREATH: 1
WHEEZING: 0
SINUS PAIN: 0
ABDOMINAL DISTENTION: 0
RHINORRHEA: 1
COUGH: 1
DIARRHEA: 0
CHEST TIGHTNESS: 0
SINUS PRESSURE: 0
ABDOMINAL PAIN: 1
VOMITING: 0
NAUSEA: 1
CONSTIPATION: 0

## 2021-10-07 NOTE — PROGRESS NOTES
10/7/2021    TELEHEALTH EVALUATION -- Audio/Visual (During On license of [facility]-23 public health emergency)      Genevieve Vasques (:  1995) is a 31 y.o. female,New patient to myself, but established with our practice, prior PCP left the practice, here for evaluation of the following chief complaint(s): Concern For COVID-19        ASSESSMENT/PLAN:    1. Acute bronchitis due to infection  Worsening  -     azithromycin (ZITHROMAX) 250 MG tablet; 500 mg orally on day one followed by 250 mg daily on days two through five, Disp-6 tablet, R-0Normal  2. COVID-19 virus infection  Confirmed COVID-19 infection  -     azithromycin (ZITHROMAX) 250 MG tablet; 500 mg orally on day one followed by 250 mg daily on days two through five, Disp-6 tablet, R-0Normal  -     ondansetron (ZOFRAN) 4 MG tablet; Take 1 tablet by mouth every 6 hours as needed for Nausea or Vomiting, Disp-24 tablet, R-0Normal  Increase fluids, increase proteins in diet, vitamin C and zinc, rest and self isolate  -Start albuterol inhaler 2 puffs every 6 hours as needed for cough and shortness of breath  I also advised her to get a pulse oximeter and self monitor her pulse ox and if below 90% to go to the emergency room otherwise self-monitoring and symptomatic treatment  -Counseling given to continue to abstain from smoking, she did not smoking 4 days, praise given      FMLA completed with return back to work on 10/13/2021, to self isolate from 10/1/2021 through 10/12/2021. She was seen at urgent care on 10/4/2021 when she had  COVID-19 test positive, report in media. If symptoms persist, Lacie Waller was advised to call us on Monday for another appointment to extend her FMLA until 10/19/2021 if needed only. Patient is not vaccinated, we do not know at this time if she will be fully recovered by 10/13/2021 when she is to be released back to work.       Lacie Waller received counseling on the following healthy behaviors: nutrition, exercise and medication adherence and smoking cessation  Reviewed prior labs and health maintenance  Discussed use, benefit, and side effects of prescribed medications. Barriers to medication compliance addressed. Patient given educational materials - see patient instructions  All patient questions answered. Patient voiced understanding. The patient's past medical,surgical, social, and family history as well as her current medications and allergies were reviewed as documented in today's encounter. Medications, labs, diagnostic studies, consultations and follow-up as documented in this encounter. Return in about 4 months (around 2022) for Face-2F-30mins PHYSICAL, VISION screen, PHQ9. .    Can do physical or PAP smear    No future appointments. SUBJECTIVE/OBJECTIVE:  Manuel Robertson (:  1995) has requested an audio/video evaluation for the following concern(s):Concern For COVID-19      Darel Leventhal reports symptoms started 20    She says on 10/4/2021 she test positive for COVID 19 at urgent care, she was able to send us the report in my chart and it is present in media. Currently she reports  nausea, cough, stomach aches, headache , ear pain, coughing, lost of appetite, muscle aching, has occasionally chills, fever initially 102 F till 2 days ago. She reports dyspnea on exertion when climbing up the stairs. Reports yellow mucus  Has no taste and smell    She has been off work from , will keep her off   return to work 13 if symptoms resolved. She is not vaccinated      Nicotine dependence. Smoker, counseling given to quit smoking. Not smoking for 4 days    Ready to quit: Yes  Counseling given: Yes  Comment: Patient current vapes                Review of Systems   Constitutional: Positive for activity change, appetite change, chills, fatigue, fever and unexpected weight change. Negative for diaphoresis. HENT: Positive for congestion, ear pain, postnasal drip and rhinorrhea.  Negative for sinus pressure and sinus pain. Respiratory: Positive for cough and shortness of breath. Negative for chest tightness and wheezing. Cardiovascular: Negative for chest pain, palpitations and leg swelling. Gastrointestinal: Positive for abdominal pain and nausea. Negative for abdominal distention, constipation, diarrhea and vomiting. Musculoskeletal: Positive for myalgias. Neurological: Positive for headaches. Hematological: Does not bruise/bleed easily. Prior to Visit Medications    Medication Sig Taking? Authorizing Provider   amphetamine-dextroamphetamine (ADDERALL, 5MG,) 5 MG tablet Take 1 tablet by mouth daily . Patient not taking: Reported on 11/13/2019  BRIANNE Garcia - CNP       Social History     Tobacco Use    Smoking status: Current Every Day Smoker     Packs/day: 0.25     Types: Cigarettes    Smokeless tobacco: Never Used    Tobacco comment: Patient current vapes   Vaping Use    Vaping Use: Every day   Substance Use Topics    Alcohol use: No     Comment: Rarely.  Drug use: No          PHYSICAL EXAMINATION:    Vital Signs: (As obtained by patient/caregiver or practitioner observation)  -vital signs stable and within normal limits except morbid obesity per BMI, BMI 40.3 kg/M2  Patient-Reported Vitals 10/7/2021   Patient-Reported Weight 235 lbs   Patient-Reported Height 5 ft 4 in   Patient-Reported Temperature 98.3 F           Constitutional: [x] Appears well-developed and well-nourished [x] No apparent distress      [x] Abnormal-obese      Mental status  [x] Alert and awake  [x] Oriented to person/place/time [x]Able to follow commands      Eyes:  EOM    [x]  Normal  [] Abnormal-  Sclera  [x]  Normal  [] Abnormal -         Discharge [x]  None visible  [] Abnormal -    HENT:   [x] Normocephalic, atraumatic.   [] Abnormal   [x] Mouth/Throat: Mucous membranes are moist.     External Ears [x] Normal  [] Abnormal-     Neck: [x] No visualized mass     Pulmonary/Chest: [x] Respiratory effort normal.  [x] No visualized signs of difficulty breathing or respiratory distress        [x] Abnormal: deep hacking wet cough during the encounter     Musculoskeletal:   [x] Normal gait with no signs of ataxia         [x] Normal range of motion of neck        [] Abnormal-       Neurological:        [x] No Facial Asymmetry (Cranial nerve 7 motor function) (limited exam to video visit)          [x] No gaze palsy        [] Abnormal-            Skin:        [x] No significant exanthematous lesions or discoloration noted on facial skin         [] Abnormal-            Psychiatric:      [x] No Hallucinations       [x]Mood is normal      [x]Behavior is normal      [x]Judgment is normal      [x]Thought content is normal       [] Abnormal-     Other pertinent observable physical exam findings- none    Due to this being a TeleHealth encounter, evaluation of the following organ systems is limited: Vitals/Constitutional/EENT/Resp/CV/GI//MS/Neuro/Skin/Heme-Lymph-Imm. Orders Placed This Encounter   Medications    azithromycin (ZITHROMAX) 250 MG tablet     Si mg orally on day one followed by 250 mg daily on days two through five     Dispense:  6 tablet     Refill:  0    albuterol sulfate HFA (PROVENTIL HFA) 108 (90 Base) MCG/ACT inhaler     Sig: Inhale 2 puffs into the lungs every 6 hours as needed for Wheezing or Shortness of Breath (cough)     Dispense:  8 g     Refill:  0    ondansetron (ZOFRAN) 4 MG tablet     Sig: Take 1 tablet by mouth every 6 hours as needed for Nausea or Vomiting     Dispense:  24 tablet     Refill:  0       No orders of the defined types were placed in this encounter. There are no discontinued medications. Alfie Vishnuyahaira, was evaluated through a synchronous (real-time) audio-video encounter. The patient (or guardian if applicable) is aware that this is a billable service. Verbal consent to proceed has been obtained within the past 12 months.  The visit was conducted pursuant to the emergency declaration under the [facility], [facility] authority and the [employer] Act. Patient identification was verified    Patient was alone   The patient was located in a state where the provider was credentialed to provide care. On this date 10/7/2021 I have spent 35 minutes reviewing previous notes, test results and face to face with the patient discussing the diagnosis and importance of compliance with the treatment plan as well as documenting on the day of the visit and to complete her FMLA. --Shelli Leigh MD on 10/7/2021 at 10:10 PM    An electronic signature was used to authenticate this note.

## 2021-10-08 NOTE — TELEPHONE ENCOUNTER
Return in about 4 months (around 2/7/2022) for Face-2F-30mins PHYSICAL, VISION screen, PHQ9. .    Can do physical or PAP smear

## 2021-10-11 ENCOUNTER — PATIENT MESSAGE (OUTPATIENT)
Dept: FAMILY MEDICINE CLINIC | Age: 26
End: 2021-10-11

## 2021-10-11 ENCOUNTER — E-VISIT (OUTPATIENT)
Dept: FAMILY MEDICINE CLINIC | Age: 26
End: 2021-10-11
Payer: COMMERCIAL

## 2021-10-11 DIAGNOSIS — U07.1 COVID-19 VIRUS INFECTION: Primary | ICD-10-CM

## 2021-10-11 DIAGNOSIS — K29.00 ACUTE GASTRITIS WITHOUT HEMORRHAGE, UNSPECIFIED GASTRITIS TYPE: ICD-10-CM

## 2021-10-11 PROCEDURE — 99422 OL DIG E/M SVC 11-20 MIN: CPT | Performed by: FAMILY MEDICINE

## 2021-10-11 RX ORDER — FAMOTIDINE 20 MG/1
20 TABLET, FILM COATED ORAL 2 TIMES DAILY
Qty: 60 TABLET | Refills: 0 | Status: SHIPPED | OUTPATIENT
Start: 2021-10-11 | End: 2022-06-01

## 2021-10-11 RX ORDER — PROMETHAZINE HYDROCHLORIDE 25 MG/1
25 TABLET ORAL EVERY 12 HOURS PRN
Qty: 14 TABLET | Refills: 0 | Status: SHIPPED | OUTPATIENT
Start: 2021-10-11 | End: 2021-10-18

## 2021-10-11 ASSESSMENT — LIFESTYLE VARIABLES
SMOKING_STATUS: NO, BUT I USED TO SMOKE
PACKS_PER_DAY: LESS THAN 1

## 2021-10-11 NOTE — LETTER
[facility]  [address] [place], [place] [address] [facility] [address] [address]  Phone: [phone]  Fax: [phone]    Moncho Larson MD        October 11, 2021     Patient: Denisse Guadarrama   YOB: 1995   Date of Visit: 10/11/2021       To Whom It May Concern: It is my medical opinion that Kris Shannon should remain off work until 10/19/2021 will return back to work on 10/20/2021. Patient was reevaluated via Evisit today and the symptoms are not improved. If you have any questions or concerns, please don't hesitate to call.     Sincerely,        Moncho Larson MD

## 2021-10-11 NOTE — PROGRESS NOTES
HPI: per patient's questionnaire &  Minta Lonerock  to Me        10/11/21 9:20 AM  I am following up about my Covid symptoms. Sherryle Moder we talked about me going back to work either the 13th or the 19th depending on my symptoms . I am still having all of them, some have worsened & I now have diarrhea & my fever is back. Last checked 101. I was also prescribed generic Zofran for the nausea & when I take it I get sicker. What else can I do for the nausea & stomach aches? They're awful. Can we please approve the extension to be off of work until the 19th? EXAM: not applicable    Diagnoses and all orders for this visit:    1. COVID-19 virus infection  Failing to improving  Continue symptomatic treatment  Will inquire about shortness of breath and productive cough, will add another antibiotic if needed  If worsening shortness of breath or pulse ox below 90% patient was advised to go to the emergency room. We will give symptomatic treatment for GI symptoms  - promethazine (PHENERGAN) 25 MG tablet; Take 1 tablet by mouth every 12 hours as needed for Nausea  Dispense: 14 tablet; Refill: 0  - famotidine (PEPCID) 20 MG tablet; Take 1 tablet by mouth 2 times daily  Dispense: 60 tablet; Refill: 0    2. Acute gastritis without hemorrhage, unspecified gastritis type  Worsening  - promethazine (PHENERGAN) 25 MG tablet; Take 1 tablet by mouth every 12 hours as needed for Nausea  Dispense: 14 tablet; Refill: 0  - famotidine (PEPCID) 20 MG tablet; Take 1 tablet by mouth 2 times daily  Dispense: 60 tablet; Refill: 0      No orders of the defined types were placed in this encounter. Patient was advised to contact PCP if symptoms worsen or failing to change as expected        11-20 minutes were spent on the digital evaluation and management of this patient.

## 2021-10-11 NOTE — TELEPHONE ENCOUNTER
From: Amber Mitchell  To: Laya Jordan MD  Sent: 10/11/2021 9:20 AM EDT  Subject: Visit Follow-Up Question    I am following up about my Covid symptoms. Gregory Nuñez we talked about me going back to work either the 13th or the 19th depending on my symptoms . I am still having all of them, some have worsened & I now have diarrhea & my fever is back. Last checked 101. I was also prescribed generic Zofran for the nausea & when I take it I get sicker. What else can I do for the nausea & stomach aches? They're awful.   Can we please approve the extension to be off of work until the 19th?

## 2022-01-12 ENCOUNTER — HOSPITAL ENCOUNTER (EMERGENCY)
Age: 27
Discharge: HOME | End: 2022-01-12
Attending: EMERGENCY MEDICINE
Payer: COMMERCIAL

## 2022-01-12 ENCOUNTER — APPOINTMENT (OUTPATIENT)
Dept: ULTRASOUND IMAGING | Age: 27
End: 2022-01-12
Payer: COMMERCIAL

## 2022-01-12 VITALS
HEIGHT: 64 IN | RESPIRATION RATE: 16 BRPM | OXYGEN SATURATION: 96 % | HEART RATE: 86 BPM | SYSTOLIC BLOOD PRESSURE: 147 MMHG | BODY MASS INDEX: 40.97 KG/M2 | WEIGHT: 240 LBS | TEMPERATURE: 98.5 F | DIASTOLIC BLOOD PRESSURE: 88 MMHG

## 2022-01-12 DIAGNOSIS — R10.32 LLQ PAIN: ICD-10-CM

## 2022-01-12 DIAGNOSIS — R10.32 LEFT LOWER QUADRANT ABDOMINAL PAIN: Primary | ICD-10-CM

## 2022-01-12 LAB
ABSOLUTE EOS #: 0 K/UL (ref 0–0.4)
ABSOLUTE IMMATURE GRANULOCYTE: ABNORMAL K/UL (ref 0–0.3)
ABSOLUTE LYMPH #: 1.5 K/UL (ref 1–4.8)
ABSOLUTE MONO #: 0.4 K/UL (ref 0.1–1.2)
ANION GAP SERPL CALCULATED.3IONS-SCNC: 10 MMOL/L (ref 9–17)
BASOPHILS # BLD: 0 % (ref 0–2)
BASOPHILS ABSOLUTE: 0 K/UL (ref 0–0.2)
BILIRUBIN URINE: NEGATIVE
BUN BLDV-MCNC: 9 MG/DL (ref 6–20)
BUN/CREAT BLD: ABNORMAL (ref 9–20)
CALCIUM SERPL-MCNC: 9.1 MG/DL (ref 8.6–10.4)
CANDIDA SPECIES, DNA PROBE: NEGATIVE
CHLORIDE BLD-SCNC: 103 MMOL/L (ref 98–107)
CO2: 24 MMOL/L (ref 20–31)
COLOR: YELLOW
COMMENT UA: NORMAL
CREAT SERPL-MCNC: 0.87 MG/DL (ref 0.5–0.9)
DIFFERENTIAL TYPE: ABNORMAL
EOSINOPHILS RELATIVE PERCENT: 1 % (ref 1–4)
GARDNERELLA VAGINALIS, DNA PROBE: NEGATIVE
GFR AFRICAN AMERICAN: >60 ML/MIN
GFR NON-AFRICAN AMERICAN: >60 ML/MIN
GFR SERPL CREATININE-BSD FRML MDRD: ABNORMAL ML/MIN/{1.73_M2}
GFR SERPL CREATININE-BSD FRML MDRD: ABNORMAL ML/MIN/{1.73_M2}
GLUCOSE BLD-MCNC: 124 MG/DL (ref 70–99)
GLUCOSE URINE: NEGATIVE
HCG QUALITATIVE: NEGATIVE
HCT VFR BLD CALC: 41.3 % (ref 36–46)
HEMOGLOBIN: 13.5 G/DL (ref 12–16)
IMMATURE GRANULOCYTES: ABNORMAL %
KETONES, URINE: NEGATIVE
LEUKOCYTE ESTERASE, URINE: NEGATIVE
LYMPHOCYTES # BLD: 24 % (ref 24–44)
MCH RBC QN AUTO: 26.5 PG (ref 26–34)
MCHC RBC AUTO-ENTMCNC: 32.6 G/DL (ref 31–37)
MCV RBC AUTO: 81.1 FL (ref 80–100)
MONOCYTES # BLD: 6 % (ref 2–11)
NITRITE, URINE: NEGATIVE
NRBC AUTOMATED: ABNORMAL PER 100 WBC
PDW BLD-RTO: 13.9 % (ref 12.5–15.4)
PH UA: 6 (ref 5–8)
PLATELET # BLD: 268 K/UL (ref 140–450)
PLATELET ESTIMATE: ABNORMAL
PMV BLD AUTO: 8.8 FL (ref 6–12)
POTASSIUM SERPL-SCNC: 4.2 MMOL/L (ref 3.7–5.3)
PROTEIN UA: NEGATIVE
RBC # BLD: 5.09 M/UL (ref 4–5.2)
RBC # BLD: ABNORMAL 10*6/UL
SEG NEUTROPHILS: 69 % (ref 36–66)
SEGMENTED NEUTROPHILS ABSOLUTE COUNT: 4.5 K/UL (ref 1.8–7.7)
SODIUM BLD-SCNC: 137 MMOL/L (ref 135–144)
SOURCE: NORMAL
SPECIFIC GRAVITY UA: 1.01 (ref 1–1.03)
TRICHOMONAS VAGINALIS DNA: NEGATIVE
TURBIDITY: CLEAR
URINE HGB: NEGATIVE
UROBILINOGEN, URINE: NORMAL
WBC # BLD: 6.5 K/UL (ref 3.5–11)
WBC # BLD: ABNORMAL 10*3/UL

## 2022-01-12 PROCEDURE — 76856 US EXAM PELVIC COMPLETE: CPT

## 2022-01-12 PROCEDURE — 87660 TRICHOMONAS VAGIN DIR PROBE: CPT

## 2022-01-12 PROCEDURE — 87510 GARDNER VAG DNA DIR PROBE: CPT

## 2022-01-12 PROCEDURE — 85025 COMPLETE CBC W/AUTO DIFF WBC: CPT

## 2022-01-12 PROCEDURE — 87491 CHLMYD TRACH DNA AMP PROBE: CPT

## 2022-01-12 PROCEDURE — 80048 BASIC METABOLIC PNL TOTAL CA: CPT

## 2022-01-12 PROCEDURE — 99284 EMERGENCY DEPT VISIT MOD MDM: CPT

## 2022-01-12 PROCEDURE — 87480 CANDIDA DNA DIR PROBE: CPT

## 2022-01-12 PROCEDURE — 36415 COLL VENOUS BLD VENIPUNCTURE: CPT

## 2022-01-12 PROCEDURE — 84703 CHORIONIC GONADOTROPIN ASSAY: CPT

## 2022-01-12 PROCEDURE — 76830 TRANSVAGINAL US NON-OB: CPT

## 2022-01-12 PROCEDURE — 6360000002 HC RX W HCPCS: Performed by: EMERGENCY MEDICINE

## 2022-01-12 PROCEDURE — 96372 THER/PROPH/DIAG INJ SC/IM: CPT

## 2022-01-12 PROCEDURE — 87591 N.GONORRHOEAE DNA AMP PROB: CPT

## 2022-01-12 PROCEDURE — 81003 URINALYSIS AUTO W/O SCOPE: CPT

## 2022-01-12 PROCEDURE — 93976 VASCULAR STUDY: CPT

## 2022-01-12 RX ORDER — KETOROLAC TROMETHAMINE 15 MG/ML
30 INJECTION, SOLUTION INTRAMUSCULAR; INTRAVENOUS ONCE
Status: COMPLETED | OUTPATIENT
Start: 2022-01-12 | End: 2022-01-12

## 2022-01-12 RX ADMIN — KETOROLAC TROMETHAMINE 30 MG: 15 INJECTION, SOLUTION INTRAMUSCULAR; INTRAVENOUS at 09:35

## 2022-01-12 ASSESSMENT — PAIN SCALES - GENERAL
PAINLEVEL_OUTOF10: 8
PAINLEVEL_OUTOF10: 3

## 2022-01-12 NOTE — ED NOTES
Dr Luis Eduardo Calvin is at bedside performing pelvic exam. Vaginal swabs collected. Writer at bedside as chaperone. Pt tolerated well.      Kee Wilson RN  01/12/22 [id number]

## 2022-01-12 NOTE — RESULT ENCOUNTER NOTE
Addressed in ED   Normal ultrasound pelvis  Future Appointments  2/18/2022  3:00 PM    Cleve Murcia MD     fp [facility]

## 2022-01-12 NOTE — RESULT ENCOUNTER NOTE
Addressed in ED     Future Appointments  2/18/2022  3:00 PM    Jeff Motley MD     [facility]

## 2022-01-12 NOTE — ED PROVIDER NOTES
[address] [facility] ED  [address] [facility] [address]  Phone: [phone]  Fax: Georgie Santiago [phone]      Pt Name: Juan Carlos Koehler  MRN: [id number]  [place] 1995  Date of evaluation: 2022    CHIEF COMPLAINT       Chief Complaint   Patient presents with    Abdominal Pain     pt is concerned about a possible ovarian cyst. reports hx of ovarian cysts and this pain feels similar. c/o LLQ abd and left flank pain; pain worsened this morning. reports nausea, denies v/d. unknown if she is pregnant.  Flank Pain       HISTORY OF PRESENT ILLNESS    Juan Carlos Koehler is a 31 y.o. female who presents to the emergency room complaining of left lower quadrant abdominal pain. She always has some discomfort in that area however it is worse this morning. Around 645 it became more intense. She is 2 days late for her menstrual cycle not sure if she is pregnant. She is sexually active without protection. She is a G4, . No fevers or chills no nausea vomiting. Pain radiates from her left lower quadrant to her left flank. Denies any other complaints. REVIEW OF SYSTEMS       Constitutional: No fevers or chills   HEENT: No sore throat, rhinorrhea, or earache   Eyes: No blurry vision or double vision no drainage   Cardiovascular: No chest pain or tachycardia   Respiratory: No wheezing or shortness of breath no cough   Gastrointestinal: No nausea, vomiting, diarrhea, constipation, positive left lower quadrant abdominal pain   : No hematuria or dysuria   Musculoskeletal: No extremity swelling or pain positive left leg pain  Skin: No rash   Neurological: No focal neurologic complaints, paresthesias, weakness, or headache     PAST MEDICAL HISTORY    has a past medical history of Attention deficit hyperactivity disorder (ADHD), predominantly inattentive type, Chronic back pain, Obesity, Ovarian cyst, bilateral, and Seasonal allergic rhinitis.     SURGICAL HISTORY      has a past surgical history that includes Tonsillectomy and adenoidectomy (age 10) and Tear duct surgery (Bilateral, age 21 months). CURRENT MEDICATIONS       Previous Medications    ALBUTEROL SULFATE HFA (PROVENTIL HFA) 108 (90 BASE) MCG/ACT INHALER    Inhale 2 puffs into the lungs every 6 hours as needed for Wheezing or Shortness of Breath (cough)    AMPHETAMINE-DEXTROAMPHETAMINE (ADDERALL, 5MG,) 5 MG TABLET    Take 1 tablet by mouth daily . FAMOTIDINE (PEPCID) 20 MG TABLET    Take 1 tablet by mouth 2 times daily       ALLERGIES     is allergic to seasonal.    FAMILY HISTORY     She indicated that her mother is alive. She indicated that her father is alive. She indicated that all of her four sisters are alive. She indicated that her brother is alive. She indicated that her maternal grandmother is alive. She indicated that her maternal grandfather is alive. She indicated that her paternal grandmother is alive. She indicated that her paternal grandfather is . family history includes Anxiety Disorder in her mother; Bipolar Disorder in her sister; Depression in her mother. SOCIAL HISTORY      reports that she has quit smoking. Her smoking use included cigarettes. She smoked 0.25 packs per day. She has never used smokeless tobacco. She reports that she does not drink alcohol and does not use drugs.     PHYSICAL EXAM       ED Triage Vitals [22 0822]   BP Temp Temp Source Pulse Resp SpO2 Height Weight   (!) 147/88 98.5 °F (36.9 °C) Oral 86 16 96 % 5' 4\" (1.626 m) 240 lb (108.9 kg)       Constitutional: Alert, oriented x3, nontoxic, answering questions appropriately, acting properly for age, in no acute distress   HEENT: Extraocular muscles intact,  Neck: Trachea midline   Cardiovascular: Regular rhythm and rate no murmurs   Respiratory: Clear to auscultation bilaterally no wheezes, rhonchi, rales, no respiratory distress no tachypnea no retractions no hypoxia  Gastrointestinal: Soft, mild to moderate left lower quadrant tenderness to palpation, nondistended, positive bowel sounds. No rebound, rigidity, or guarding. : Very minimal discharge no cervical motion tenderness no bleeding  Musculoskeletal: No extremity pain or swelling   Neurologic: Moving all 4 extremities without difficulty there are no gross focal neurologic deficits   Skin: Warm and dry       DIFFERENTIAL DIAGNOSIS/ MDM:     Labs. Pain control. Ultrasound. DIAGNOSTIC RESULTS     EKG: All EKG's are interpreted by the Emergency Department Physician who either signs or Co-signs this chart in the absence of a cardiologist.        Not indicated unless otherwise documented above    LABS:  Results for orders placed or performed during the hospital encounter of 01/12/22   VAGINITIS DNA PROBE    Specimen: Vaginal   Result Value Ref Range    Source . VAGINAL SWAB     Trichomonas Vaginalis DNA NEGATIVE NEGATIVE    GARDNERELLA VAGINALIS, DNA PROBE NEGATIVE NEGATIVE    CANDIDA SPECIES, DNA PROBE NEGATIVE NEGATIVE   CBC Auto Differential   Result Value Ref Range    WBC 6.5 3.5 - 11.0 k/uL    RBC 5.09 4.0 - 5.2 m/uL    Hemoglobin 13.5 12.0 - 16.0 g/dL    Hematocrit 41.3 36 - 46 %    MCV 81.1 80 - 100 fL    MCH 26.5 26 - 34 pg    MCHC 32.6 31 - 37 g/dL    RDW 13.9 12.5 - 15.4 %    Platelets 273 026 - 390 k/uL    MPV 8.8 6.0 - 12.0 fL    NRBC Automated NOT REPORTED per 100 WBC    Differential Type NOT REPORTED     Seg Neutrophils 69 (H) 36 - 66 %    Lymphocytes 24 24 - 44 %    Monocytes 6 2 - 11 %    Eosinophils % 1 1 - 4 %    Basophils 0 0 - 2 %    Immature Granulocytes NOT REPORTED 0 %    Segs Absolute 4.50 1.8 - 7.7 k/uL    Absolute Lymph # 1.50 1.0 - 4.8 k/uL    Absolute Mono # 0.40 0.1 - 1.2 k/uL    Absolute Eos # 0.00 0.0 - 0.4 k/uL    Basophils Absolute 0.00 0.0 - 0.2 k/uL    Absolute Immature Granulocyte NOT REPORTED 0.00 - 0.30 k/uL    WBC Morphology NOT REPORTED     RBC Morphology NOT REPORTED     Platelet Estimate NOT REPORTED    Basic Metabolic Panel Result Value Ref Range    Glucose 124 (H) 70 - 99 mg/dL    BUN 9 6 - 20 mg/dL    CREATININE 0.87 0.50 - 0.90 mg/dL    Bun/Cre Ratio NOT REPORTED 9 - 20    Calcium 9.1 8.6 - 10.4 mg/dL    Sodium 137 135 - 144 mmol/L    Potassium 4.2 3.7 - 5.3 mmol/L    Chloride 103 98 - 107 mmol/L    CO2 24 20 - 31 mmol/L    Anion Gap 10 9 - 17 mmol/L    GFR Non-African American >60 >60 mL/min    GFR African American >60 >60 mL/min    GFR Comment          GFR Staging NOT REPORTED    Urinalysis Reflex to Culture    Specimen: Urine, clean catch   Result Value Ref Range    Color, UA Yellow Yellow    Turbidity UA Clear Clear    Glucose, Ur NEGATIVE NEGATIVE    Bilirubin Urine NEGATIVE NEGATIVE    Ketones, Urine NEGATIVE NEGATIVE    Specific Gravity, UA 1.015 1.005 - 1.030    Urine Hgb NEGATIVE NEGATIVE    pH, UA 6.0 5.0 - 8.0    Protein, UA NEGATIVE NEGATIVE    Urobilinogen, Urine Normal Normal    Nitrite, Urine NEGATIVE NEGATIVE    Leukocyte Esterase, Urine NEGATIVE NEGATIVE    Urinalysis Comments       Microscopic exam not performed based on chemical results unless requested in original order. Urinalysis Comments          Urinalysis Comments       Utilizing a urinalysis as the only screening method to exclude a potential uropathogen can be unreliable in many patient populations. Rapid screening tests are less sensitive than culture and if UTI is a clinical possibility, culture should be considered despite a negative urinalysis. HCG Qualitative, Serum   Result Value Ref Range    hCG Qual NEGATIVE NEGATIVE       Not indicated unless otherwise documented above    RADIOLOGY:   I reviewed the radiologist interpretations:    [address]   Final Result   Unremarkable pelvic ultrasound. Normal Doppler flow within the ovaries. US NON OB TRANSVAGINAL   Final Result   Unremarkable pelvic ultrasound. Normal Doppler flow within the ovaries.          US DUP ABD PEL RETRO SCROT LIMITED   Final Result   Unremarkable pelvic ultrasound. Normal Doppler flow within the ovaries. Not indicated unless otherwise documented above    EMERGENCY DEPARTMENT COURSE:     The patient was given the following medications:  Orders Placed This Encounter   Medications    ketorolac (TORADOL) injection 30 mg        Vitals:   -------------------------  BP (!) 147/88   Pulse 86   Temp 98.5 °F (36.9 °C) (Oral)   Resp 16   Ht 5' 4\" (1.626 m)   Wt 108.9 kg (240 lb)   LMP 12/22/2021   SpO2 96%   BMI 41.20 kg/m²     10:05 AM resting comfortably no acute distress. Ultrasound negative for torsion or tubal pregnancy. Negative for cyst.  Labs unremarkable urine no blood or infection. Preliminary pelvic negative for infection. GC and Chlamydia pending. Normal CBC no anemia. Normal electrolytes and kidney function. No blood in the urine. Low suspicion for kidney stone  however it could be a possibility. Unclear etiology she says she has this pain often but it is more intense than normal.  She be discharged to follow-up with family physician. Motrin and or Tylenol as needed for pain and return if worsening symptoms or any other concerns. The patient and her stepdad understands that at this time there is no evidence for a more malignant underlying process, but also understands that early in the process of an illness or injury, an emergency department workup can be falsely reassuring. Routine discharge counseling was given, and it is understood that worsening, changing or persistent symptoms should prompt an immediate call or follow up with their primary physician or return to the emergency department. The importance of appropriate follow up was also discussed. I have reviewed the disposition diagnosis. I have answered the questions and given discharge instructions. There was voiced understanding of these instructions and no further questions or complaints.     CRITICAL CARE:    None    CONSULTS:    None    PROCEDURES:    None      OARRS Report if indicated             FINAL IMPRESSION      1. Left lower quadrant abdominal pain    2.  LLQ pain          DISPOSITION/PLAN   DISPOSITION Decision To Discharge 01/12/2022 10:05:26 AM        CONDITION ON DISPOSITION: STABLE       PATIENT REFERRED TO:  Wai Valdez MD  [address] [place] [address].  [address] [address]  [phone]    Schedule an appointment as soon as possible for a visit in 3 days        DISCHARGE MEDICATIONS:  New Prescriptions    No medications on file       (Please note that portions of this note were completed with a voice recognition program.  Efforts were made to edit the dictations but occasionally words are mis-transcribed.)    Whit Ballard DO   Attending Emergency Physician      Whit Ballard DO  01/12/22 1007

## 2022-01-12 NOTE — RESULT ENCOUNTER NOTE
Addressed in ED blood glucose increased otherwise within normal limits, prior prediabetes    Lab Results       Component                Value               Date                       LABA1C                   5.7                 07/12/2021                  Future Appointments  2/18/2022  3:00 PM    Jose Raya MD     [facility]

## 2022-01-13 LAB
C TRACH DNA GENITAL QL NAA+PROBE: NEGATIVE
N. GONORRHOEAE DNA: NEGATIVE
SPECIMEN DESCRIPTION: NORMAL

## 2022-01-13 NOTE — RESULT ENCOUNTER NOTE
Diana comment sent to patient.   Negative GC chlamydia  Future Appointments  2/18/2022  3:00 PM    Wai Valdez MD     fp [facility]               TOP

## 2022-02-09 ENCOUNTER — NURSE TRIAGE (OUTPATIENT)
Dept: OTHER | Facility: CLINIC | Age: 27
End: 2022-02-09

## 2022-02-09 NOTE — TELEPHONE ENCOUNTER
Appointment scheduled    Future Appointments   Date Time Provider Norbert Brandie   2/15/2022  3:00 PM MD huseyin Enriquez [facility]   2/18/2022  3:00 PM [facility]

## 2022-02-17 ENCOUNTER — TELEPHONE (OUTPATIENT)
Dept: FAMILY MEDICINE CLINIC | Age: 27
End: 2022-02-17

## 2022-02-17 NOTE — TELEPHONE ENCOUNTER
----- Message from [address] sent at 2/17/2022  9:38 AM EST -----  Subject: Appointment Request    Reason for Call: Routine Physical Exam    QUESTIONS  Type of Appointment? Established Patient  Reason for appointment request? No appointments available during search  Additional Information for Provider? pt had to cancel her appt for   2/18/2022 due to work, there were no other dates avail with Dr Santosh Covarrubias and   the other provider searches did not work with pts work schedule. She is   looking for Monday appt. Please call if you can get her in on Mondays  ---------------------------------------------------------------------------  --------------  CALL BACK INFO  What is the best way for the office to contact you? OK to leave message on   voicemail  Preferred Call Back Phone Number? [phone]  ---------------------------------------------------------------------------  --------------  SCRIPT ANSWERS  Relationship to Patient? Self  Have your symptoms changed? No  (If the patient has Medicare as their primary insurance coverage ask this   question) Are you requesting a Medicare Annual Wellness Visit? No  (Is the patient requesting a pap smear with their physical exam?)? No  (Is the patient requesting their annual physical and does not need PAP or   AWV per above?)? Yes   Have you been diagnosed with, awaiting test results for, or told that you   are suspected of having COVID-19 (Coronavirus)? (If patient has tested   negative or was tested as a requirement for work, school, or travel and   not based on symptoms, answer no)? No  Within the past 10 days have you developed any of the following symptoms   (answer no if symptoms have been present longer than 10 days or began   more than 10 days ago)?  Fever or Chills, Cough, Shortness of breath or   difficulty breathing, Loss of taste or smell, Sore throat, Nasal   congestion, Sneezing or runny nose, Fatigue or generalized body aches   (answer no if pain is specific to a body part e.g. back pain), Diarrhea,   Headache? No  Have you had close contact with someone with COVID-19 in the last 7 days? No  (Service Expert  click yes below to proceed with [employer] As Usual   Scheduling)?  Yes

## 2022-02-18 ENCOUNTER — OFFICE VISIT (OUTPATIENT)
Dept: FAMILY MEDICINE CLINIC | Age: 27
End: 2022-02-18
Payer: COMMERCIAL

## 2022-02-18 VITALS
OXYGEN SATURATION: 99 % | SYSTOLIC BLOOD PRESSURE: 122 MMHG | BODY MASS INDEX: 42.51 KG/M2 | HEIGHT: 64 IN | HEART RATE: 82 BPM | TEMPERATURE: 97.9 F | DIASTOLIC BLOOD PRESSURE: 70 MMHG | WEIGHT: 249 LBS

## 2022-02-18 DIAGNOSIS — L70.0 ACNE VULGARIS: ICD-10-CM

## 2022-02-18 DIAGNOSIS — M79.604 RIGHT LEG PAIN: Primary | ICD-10-CM

## 2022-02-18 DIAGNOSIS — E66.01 MORBID OBESITY WITH BMI OF 40.0-44.9, ADULT (HCC): ICD-10-CM

## 2022-02-18 PROCEDURE — 99213 OFFICE O/P EST LOW 20 MIN: CPT | Performed by: FAMILY MEDICINE

## 2022-02-18 RX ORDER — IBUPROFEN 800 MG/1
800 TABLET ORAL
Qty: 90 TABLET | Refills: 1 | Status: SHIPPED | OUTPATIENT
Start: 2022-02-18 | End: 2022-06-01

## 2022-02-18 ASSESSMENT — ENCOUNTER SYMPTOMS
ROS SKIN COMMENTS: ACNE
RESPIRATORY NEGATIVE: 1
ABDOMINAL PAIN: 0
COLOR CHANGE: 1
SHORTNESS OF BREATH: 0

## 2022-02-18 NOTE — LETTER
[facility]  [address] [place] [id number]  Phone: [phone]  Fax: [phone]    BRIANNE Rivera CNP        February 18, 2022     Patient: Kera Cochran   YOB: 1995   Date of Visit: 2/18/2022       To Whom it May Concern:    Kim Kim was seen in my clinic on 2/18/2022. She may return to work on 2/18/2022. please excuse her for  am.    If you have any questions or concerns, please don't hesitate to call.     Sincerely,         BRIANNE Wagner CNP

## 2022-02-18 NOTE — PROGRESS NOTES
Visit Information    Have you changed or started any medications since your last visit including any over-the-counter medicines, vitamins, or herbal medicines? no   Have you stopped taking any of your medications? Is so, why? -  no  Are you having any side effects from any of your medications? - no    Have you seen any other physician or provider since your last visit?  no   Have you had any other diagnostic tests since your last visit?  no   Have you been seen in the emergency room and/or had an admission in a hospital since we last saw you?  yes - [facility]. Have you had your routine dental cleaning in the past 6 months?  yes -     Do you have an active MyChart account? If no, what is the barrier?   Yes    Patient Care Team:  Delmy Butler MD as PCP - General (Family Medicine)  Delmy Butler MD as PCP - [address] [place] [address] [facility]  Butch Tatum MD as Obstetrician (Perinatology)  Jamar Baires DO as Consulting Physician (Obstetrics & Gynecology)    Medical History Review  Past Medical, Family, and Social History reviewed and does contribute to the patient presenting condition    Health Maintenance   Topic Date Due    Hepatitis C screen  Never done    Varicella vaccine (1 of 2 - 2-dose childhood series) Never done    COVID-19 Vaccine (1) Never done    DTaP/Tdap/Td vaccine (1 - Tdap) Never done    Pap smear  Never done    Flu vaccine (1) Never done    Depression Screen  07/08/2022    A1C test (Diabetic or Prediabetic)  07/12/2022    HIV screen  Completed    Hepatitis A vaccine  Aged Out    Hepatitis B vaccine  Aged Out    Hib vaccine  Aged Out    Meningococcal (ACWY) vaccine  Aged Out    Pneumococcal 0-64 years Vaccine  Aged Out

## 2022-02-18 NOTE — PROGRESS NOTES
[facility]ANS  [facility] [address] [facility] [address].  [address] [address]  Dept: [address] (:  1995) is a 28 y.o. female. Patient is here for evaluation of the following chief complaint(s):  Chief Complaint   Patient presents with    Numbness     right foot x 2 1/2 weeks alos toes tingling     Leg Pain     right leg pain         SUBJECTIVE/OBJECTIVE:  AROLDO Melendez is a 28 y.o. female patient. Patient is an established patient of Dr. Marquis Huang . Patient has a known history of acne, obesity and right leg pain. Patient came in c/o some intermittent pain on right posterior calf pain proximal to posterior knee area. She's had this for awhile. She also reports having noticed a lump form in the area that causes a a lot of pain but also goes away no trauam. This is not present today. She is morbidly obese, works as a  always sitting and driving. Had a previous evaluation in the past a soft tissue US done it was normal.     Also c/o some neck and back pain but does not have any association with pain on her leg. 2022  HISTORY:   ORDERING SYSTEM PROVIDED HISTORY: Mass of right lower leg   TECHNOLOGIST PROVIDED HISTORY:   This procedure can be scheduled via [employer]. Access your [employer] account by   visiting [email or web address]. FINDINGS:   Direct scanning in the right calf at the site of apparently palpable mass was   performed. By ultrasound no discrete mass or fluid collection is seen. Impression   Unremarkable exam         ACNE  Facial acne used some OTC cream with no success. WEIGHT  Patient's BMI is Body mass index is 42.74 kg/m². kg/m2. BMI is increasing. Patient understands that this condition increases the patient's risk for chronic conditions.   Wt Readings from Last 3 Encounters:   22 249 lb (112.9 kg)   22 240 lb (108.9 kg)   21 245 lb 9.6 oz (111.4 kg)       DEPRESSION SCREENING: Negative  PHQ Scores 2021 2019 3/19/2018 2017 10/20/2016   PHQ2 Score 0 0 0 0 0   PHQ9 Score 0 0 0 0 0     VITAL SIGNS:  Vitals:    22 1048   BP: 122/70   Site: Left Upper Arm   Position: Sitting   Cuff Size: Large Adult   Pulse: 82   Temp: 97.9 °F (36.6 °C)   TempSrc: Temporal   SpO2: 99%   Weight: 249 lb (112.9 kg)  Comment: steel toe boots   Height: 5' 4\" (1.626 m)   Estimated body mass index is 42.74 kg/m² as calculated from the following:    Height as of this encounter: 5' 4\" (1.626 m). Weight as of this encounter: 249 lb (112.9 kg). Review of Systems   Constitutional: Positive for unexpected weight change. Negative for chills and fever. HENT: Negative. Respiratory: Negative. Negative for shortness of breath. Cardiovascular: Negative. Negative for chest pain. Gastrointestinal: Negative for abdominal pain. Endocrine: Negative. Musculoskeletal: Positive for arthralgias and myalgias. Right calf/leg pain   Skin: Positive for color change and rash. acne   Neurological: Negative for headaches. Psychiatric/Behavioral: Negative for sleep disturbance. The patient is nervous/anxious. Physical Exam  Vitals and nursing note reviewed. Constitutional:       Appearance: Normal appearance. HENT:      Head: Normocephalic. Nose: Nose normal.   Cardiovascular:      Rate and Rhythm: Normal rate and regular rhythm. Pulmonary:      Effort: Pulmonary effort is normal.      Breath sounds: Normal breath sounds. Musculoskeletal:      Right knee: No bony tenderness or crepitus. No tenderness. Right lower leg: No deformity, tenderness or bony tenderness. 1+ Edema present. Left lower le+ Edema present. Comments: DP intact   Skin:     General: Skin is warm and dry. Findings: Acne present. Comments: Closed comedones around jaw lines   Neurological:      Mental Status: She is alert and oriented to person, place, and time. Psychiatric:         Mood and Affect: Mood is anxious. MEDICAL HISTORY      Diagnosis Date    Attention deficit hyperactivity disorder (ADHD), predominantly inattentive type 10/20/2016    Chronic back pain     Obesity     Ovarian cyst, bilateral     see's gynecology, Dr Flower Rdedy Seasonal allergic rhinitis 2/22/2017      MEDICATIONS  Prior to Visit Medications    Medication Sig Taking? Authorizing Provider   ibuprofen (ADVIL;MOTRIN) 800 MG tablet Take 1 tablet by mouth 3 times daily (with meals) Yes BRIANNE Wagner CNP   Clindamy-Benzoyl Per-Niacinam 1-2.5-4 % GEL Apply 1 actuation topically daily Use daily Yes BRIANNE Wagner CNP   famotidine (PEPCID) 20 MG tablet Take 1 tablet by mouth 2 times daily  Patient not taking: Reported on 2/18/2022  Camilo Erickson MD   albuterol sulfate HFA (PROVENTIL HFA) 108 (90 Base) MCG/ACT inhaler Inhale 2 puffs into the lungs every 6 hours as needed for Wheezing or Shortness of Breath (cough)  Patient not taking: Reported on 2/18/2022  Camilo Erickson MD   amphetamine-dextroamphetamine (ADDERALL, 5MG,) 5 MG tablet Take 1 tablet by mouth daily . Patient not taking: Reported on 11/13/2019  BRIANNE Cortes CNP     Controlled Substance Monitoring:  Acute and Chronic Pain Monitoring:   RX Monitoring 12/18/2017   Attestation The Prescription Monitoring Report for this patient was reviewed today. Periodic Controlled Substance Monitoring No signs of potential drug abuse or diversion identified. ;Random urine drug screen sent today. ;Medication contract signed today. ASSESSMENT/PLAN:  1. Right leg pain  *Failure to Improve  Continue to evaluate  Likely baker's cyst  Rule out DVT  Ibuprofen for pain  Compression stockings  Elevate    - VL DUP LOWER EXTREMITY VENOUS RIGHT; Future  - ibuprofen (ADVIL;MOTRIN) 800 MG tablet; Take 1 tablet by mouth 3 times daily (with meals)  Dispense: 90 tablet; Refill: 1    2.  Acne vulgaris  Failure to Improve  ADVISED TO:  Wash face at least twice a day  Use prescription cleansers  Report for worsening redness, swelling, and increasing numbers of pimples. - Clindamy-Benzoyl Per-Niacinam 1-2.5-4 % GEL; Apply 1 actuation topically daily Use daily  Dispense: 1 each; Refill: 1    3. Morbid obesity with BMI of 40.0-44.9, adult (HCC)  Failure to Improve  BMI increasing  DISCUSSED AND ADVISED TO:  Eat a low-fat and low carbohydrates diet. Avoid fried foods especially fast food. Choose healthier options for snacks. Have 5-6 servings of fruits and vegetables per day. Cut down on eating processed food. Add 30 minutes to 1 hour aerobic exercise for 3-4 days a week. On this date 2/18/2022 I have spent 15 minutes reviewing previous notes, test results and face to face with the patient discussing the diagnosis and importance of compliance with the treatment plan as well as documenting on the day of the visit. Return in about 4 weeks (around 3/18/2022) for adhd screening, Rev. results, 45 minutes pls. This note was completed by using the assistance of a speech-recognition program. However, inadvertent computerized transcription errors may be present. Although every effort was made to ensure accuracy, no guarantees can be provided that every mistake has been identified and corrected by editing.   Electronically signed by BRIANNE Charles CNP on 2/17/22 at 7:57 PM EST     --BRIANNE Wagner CNP

## 2022-02-18 NOTE — PATIENT INSTRUCTIONS
Patient Education        Castillo's Cyst: Care Instructions  Your Care Instructions     A Baker's cyst is a swelling behind the knee. It may cause pain or stiffness when you bend your knee or straighten it all the way. Baker's cysts are also called popliteal cysts. If you have arthritis or another condition that is the cause of the Baker's cyst, your doctor may treat that condition. A Baker's cyst may go away on its own. If not, or if it is causing a lot of discomfort, your doctor may drain the fluid that has built up behind the knee. In some cases, a Baker's cyst is removed in surgery. There are things you can do at home, such as staying off your leg, to reduce the swelling and pain. Follow-up care is a key part of your treatment and safety. Be sure to make and go to all appointments, and call your doctor if you are having problems. It's also a good idea to know your test results and keep a list of the medicines you take. How can you care for yourself at home? · Rest your knee as much as possible. · Ask your doctor if you can take an over-the-counter pain medicine, such as acetaminophen (Tylenol), ibuprofen (Advil, Motrin), or naproxen (Aleve). Be safe with medicines. Read and follow all instructions on the label. · Use a cane, a crutch, a walker, or another device if you need help to get around. These can help rest your knees. · If you have an elastic bandage, make sure it is snug but not so tight that your leg is numb, tingles, or swells below the bandage. Ask your doctor if you can loosen the bandage if it is too tight. · Follow your doctor's instructions about how much weight you can put on your knee. · Stay at a healthy weight. Being overweight puts extra strain on your knee. When should you call for help? Call 911 anytime you think you may need emergency care. For example, call if:    · You have chest pain, are short of breath, or you cough up blood.    Call your doctor now or seek immediate medical care if:    · You have new or worse pain.     · Your foot is cool or pale or changes color.     · You have tingling, weakness, or numbness in your foot or toes.     · You have signs of a blood clot in your leg (called a deep vein thrombosis), such as:  ? Pain in your calf, back of the knee, thigh, or groin. ? Redness or swelling in your leg. Watch closely for changes in your health, and be sure to contact your doctor if:    · You do not get better as expected. Where can you learn more? Go to https://"[employer]peColibri IO.MotherKnows. org and sign in to your [employer] account. Enter T229 in the USB [employer] box to learn more about \"Baker's Cyst: Care Instructions. \"     If you do not have an account, please click on the \"Sign Up Now\" link. Current as of: July 1, 2021               Content Version: 13.1  © 2006-2021 [employer] Inc[employer]. Care instructions adapted under license by [facility] ([facility]). If you have questions about a medical condition or this instruction, always ask your healthcare professional. [place] [address] any warranty or liability for your use of this information.

## 2022-03-01 ENCOUNTER — HOSPITAL ENCOUNTER (OUTPATIENT)
Dept: VASCULAR LAB | Age: 27
Discharge: HOME | End: 2022-03-01
Payer: COMMERCIAL

## 2022-03-01 DIAGNOSIS — M79.604 RIGHT LEG PAIN: ICD-10-CM

## 2022-03-01 PROCEDURE — 93971 EXTREMITY STUDY: CPT

## 2022-03-02 NOTE — RESULT ENCOUNTER NOTE
I think we can get her evaluated for lymphedema she has an appointment coming up. I think she was thinking about doing weight management. Lots of issues to discuss better over a vsit.

## 2022-03-02 NOTE — RESULT ENCOUNTER NOTE
Please notify patient: No blood clots  Future Appointments  3/22/2022  3:30 PM    MD huseyin Mitchell

## 2022-03-21 NOTE — PROGRESS NOTES
Visit Information    Have you changed or started any medications since your last visit including any over-the-counter medicines, vitamins, or herbal medicines? no   Are you having any side effects from any of your medications? -  no  Have you stopped taking any of your medications? Is so, why? -  no    Have you seen any other physician or provider since your last visit? No  Have you had any other diagnostic tests since your last visit? No  Have you been seen in the emergency room and/or had an admission to a hospital since we last saw you? No  Have you had your routine dental cleaning in the past 6 months? no    Have you activated your [employer] account? If not, what are your barriers?  Yes     Patient Care Team:  George Galicia MD as PCP - General (Family Medicine)  [facility] Galicia MD [facility]  Hortensia De Anda MD as Obstetrician (Perinatology)  Jennifer Hartman DO as Consulting Physician (Obstetrics & Gynecology)    Medical History Review  Past Medical, Family, and Social History reviewed and does contribute to the patient presenting condition    Health Maintenance   Topic Date Due    Hepatitis C screen  Never done    Varicella vaccine (1 of 2 - 2-dose childhood series) Never done    COVID-19 Vaccine (1) Never done    DTaP/Tdap/Td vaccine (1 - Tdap) Never done    Pap smear  Never done    Flu vaccine (1) Never done    Depression Screen  07/08/2022    A1C test (Diabetic or Prediabetic)  07/12/2022    HIV screen  Completed    Hepatitis A vaccine  Aged Out    Hepatitis B vaccine  Aged Out    Hib vaccine  Aged Out    Meningococcal (ACWY) vaccine  Aged Out    Pneumococcal 0-64 years Vaccine  Aged Out

## 2022-03-22 ENCOUNTER — TELEMEDICINE (OUTPATIENT)
Dept: FAMILY MEDICINE CLINIC | Age: 27
End: 2022-03-22
Payer: COMMERCIAL

## 2022-03-22 DIAGNOSIS — F90.2 ATTENTION DEFICIT HYPERACTIVITY DISORDER (ADHD), COMBINED TYPE: Primary | ICD-10-CM

## 2022-03-22 DIAGNOSIS — R53.82 CHRONIC FATIGUE: ICD-10-CM

## 2022-03-22 DIAGNOSIS — E66.01 MORBID OBESITY WITH BMI OF 40.0-44.9, ADULT (HCC): ICD-10-CM

## 2022-03-22 DIAGNOSIS — Z11.59 ENCOUNTER FOR SCREENING FOR OTHER VIRAL DISEASES: ICD-10-CM

## 2022-03-22 DIAGNOSIS — R73.03 PREDIABETES: ICD-10-CM

## 2022-03-22 DIAGNOSIS — R23.3 BRUISES EASILY: ICD-10-CM

## 2022-03-22 PROCEDURE — 99214 OFFICE O/P EST MOD 30 MIN: CPT | Performed by: FAMILY MEDICINE

## 2022-03-22 RX ORDER — CLINDAMYCIN PHOSPHATE AND BENZOYL PEROXIDE 10; 50 MG/G; MG/G
GEL TOPICAL
COMMUNITY
Start: 2022-02-18 | End: 2022-06-01

## 2022-03-22 RX ORDER — DEXTROAMPHETAMINE SACCHARATE, AMPHETAMINE ASPARTATE MONOHYDRATE, DEXTROAMPHETAMINE SULFATE AND AMPHETAMINE SULFATE 2.5; 2.5; 2.5; 2.5 MG/1; MG/1; MG/1; MG/1
10 CAPSULE, EXTENDED RELEASE ORAL DAILY
Qty: 30 CAPSULE | Refills: 0 | Status: SHIPPED | OUTPATIENT
Start: 2022-03-22 | End: 2022-05-02 | Stop reason: SDUPTHER

## 2022-03-22 ASSESSMENT — PATIENT HEALTH QUESTIONNAIRE - PHQ9
SUM OF ALL RESPONSES TO PHQ QUESTIONS 1-9: 0
SUM OF ALL RESPONSES TO PHQ QUESTIONS 1-9: 0
2. FEELING DOWN, DEPRESSED OR HOPELESS: 0
1. LITTLE INTEREST OR PLEASURE IN DOING THINGS: 0
SUM OF ALL RESPONSES TO PHQ QUESTIONS 1-9: 0
SUM OF ALL RESPONSES TO PHQ9 QUESTIONS 1 & 2: 0
SUM OF ALL RESPONSES TO PHQ QUESTIONS 1-9: 0

## 2022-03-22 ASSESSMENT — ENCOUNTER SYMPTOMS
COUGH: 0
VOMITING: 0
ABDOMINAL PAIN: 0
WHEEZING: 0
CHEST TIGHTNESS: 0
DIARRHEA: 0
CONSTIPATION: 0
SHORTNESS OF BREATH: 0
ABDOMINAL DISTENTION: 0
NAUSEA: 0

## 2022-03-22 NOTE — PROGRESS NOTES
3/22/2022    TELEHEALTH EVALUATION -- Audio/Visual (During YZYPV-30 public health emergency)      Roxanne Powers (:  1995) is a 28 y.o. female,Established patient, here for evaluation of the following chief complaint(s): ADHD, Ankle Pain (x 1 week ), Other (random brusing ), Swelling (bilateral ), Fatigue, and Hyperglycemia        ASSESSMENT/PLAN:    1. Attention deficit hyperactivity disorder (ADHD), combined type  worsening  ADULT ADHD SELF REPORT SCALE -SCORE 63 severe ADHD   - start    amphetamine-dextroamphetamine (ADDERALL XR) 10 MG extended release capsule; Take 1 capsule by mouth daily for 30 days. , Disp-30 capsule, R-0Normal  Can increase to Adderall 15 mg at next refill      2. Chronic fatigue  Failing to change as expected. Will do basic labs to rule out certain common medical conditions: hematologic, renal, hepatic, electrolyte imbalances, thyroid disorders, vitamin D deficiency, immunologic or inflammatory disorders.    -     CBC with Auto Differential; Future  -     Comprehensive Metabolic Panel; Future  -     TSH; Future  -     Vitamin D 25 Hydroxy; Future  -     Sedimentation Rate; Future  -     NITO Screen with Reflex; Future  -     C-Reactive Protein; Future  3. Bruises easily  Worsening  Large bruises noticed during the visual exam  We will do blood work to rule out inflammatory disorder, thrombocytopenia, hematologic disorders, autoimmune disorders, bleeding disorders  -     CBC with Auto Differential; Future  -     Comprehensive Metabolic Panel; Future  -     TSH; Future  -     Sedimentation Rate; Future  -     NITO Screen with Reflex; Future  -     C-Reactive Protein; Future  -     APTT; Future  4.  Prediabetes  Prior prediabetes  -     Hemoglobin A1C; Future    Lab Results   Component Value Date    LABA1C 5.7 2021     Low carb, low fat diet, increase fruits and vegetables, and exercise 4-5 times a week 30-40 minutes a day, or walk 1-2 hours per day, or wear a pedometer and get at least 10,000 steps per day. 5. Encounter for screening for other viral diseases  -     Hepatitis C Antibody; Future  -     Varicella Zoster Antibody, IgG; Future    6. Morbid obesity with BMI of 40.0-44.9, adult (HCC)  Stable  Low carb, low fat diet, increase fruits and vegetables, and exercise 4-5 times a week 30-40 minutes a day, or walk 1-2 hours per day, or wear a pedometer and get at least 10,000 steps per day. Controlled Substance Monitoring:    Acute and Chronic Pain Monitoring:   RX Monitoring 3/22/2022   Attestation -   Periodic Controlled Substance Monitoring Possible medication side effects, risk of tolerance/dependence & alternative treatments discussed. ;No signs of potential drug abuse or diversion identified. ;Assessed functional status. Dixie Cai received counseling on the following healthy behaviors: nutrition, exercise, medication adherence and weight loss    Reviewed prior labs and health maintenance  Discussed use, benefit, and side effects of prescribed medications. Barriers to medication compliance addressed. Patient given educational materials - see patient instructions  All patient questions answered. Patient voiced understanding. The patient's past medical,surgical, social, and family history as well as her current medications and allergies were reviewed as documented in today's encounter. Medications, labs, diagnostic studies, consultations and follow-up as documented in this encounter. Return in about 3 months (around 2022) for [id number] mins PAP EXAM,LMP,GP, ADHD refill. Data Unavailable    No future appointments. SUBJECTIVE/OBJECTIVE:  Gustavo Melendez (:  1995) has requested an audio/video evaluation for the following concern(s):ADHD, Ankle Pain (x 1 week ), Other (random brusing ), Swelling (bilateral ), Fatigue, and Hyperglycemia      Patient reports she was diagnosed with ADHD since a child, currently getting worse.   She says she was on Adderal 3 times in her life, once at 14 yo and then 2 more times in her early teenage years. Patient says her mom did not want her on Adderall when she was a child and that is why it was stopped. She remembers it did help her focus at school, and her grades improved. Patient says \"I cannot keep anything straight\"   \"I'm struggling completing my work and family life\". 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 Patient says in the past did not have side effects from Adderall except when she took extended release 20 mg and gave her headaches, but with 15 mg did not have side effects. Patient says, \"adderal 20 mg was too much\". She does not recall any other medication that she has tried for ADHD and she did not take it in the past few years. Patient says she usually would take it only for work when she struggles the most to complete her job and to stay organized    Has PCOS and had miscarriages   Doesn't drink alcohol  Does not use drugs  LMP 3/4/2022  I advised her she cannot become pregnant while taking the medication and to use contraceptive methods including condoms, The patient verbalizes understanding and agrees with the plan. ADULT ADHD SELF REPORT SCALE -SCORE 61, severe ADHD  Adult ADHD Self-Report Scale (ASRS) Symptom Checklist 3/21/2022   How often do you make careless mistakes when you have to work on a boring or difficult project? 4   How often do you have difficulty keeping your attention when you are doing boring or repetitive work? 4   How often do you have difficulty concentrating on what people say to you, even when they are speaking to you directly? 4   How often do you have trouble wrapping up the final details of a project, once the challenging parts have been done? 4   How often do you have difficulty getting things in order when you have to do a task that requires organization?  4   When you have a task that requires a lot of thought, how often do you avoid or delay getting started? 4   How often do you misplace or have difficulty finding things at home or at work? 4   How often are you distracted by activity or noise around you? 4   How often do you have problems remembering appointments or obligations? 4   Part A - Total 36   How often do you fidget or squirm with your hands or feet when you have to sit down for a long time? 4   How often do you leave your seat in meetings or other situations in which you are expected to remain seated? 4   How often do you feel restless or fidgety? 4   How often do you have difficulty unwinding and relaxing when you have time to yourself? 4   How often do you feel overly active and compelled to do things, like you were driven by a motor? 2   How often do you find yourself talking too much when you are in social situations? 4   When youre in a conversation, how often do you find yourself finishing the sentences of the people you are talking to, before they can finish them themselves? 2   How often do you have difficulty waiting your turn in situations when turn taking is required? 3   How often do you interrupt others when they are busy? 0   Part B - Total 27       Negative depression screening    PHQ-2 Over the past 2 weeks, how often have you been bothered by any of the following problems? Little interest or pleasure in doing things: Not at all  Feeling down, depressed, or hopeless: Not at all  PHQ-2 Score: 0  PHQ-9 Over the past 2 weeks, how often have you been bothered by any of the following problems? PHQ-9 Total Score: 0  PHQ-9 Total Score: 0    PHQ Scores 3/22/2022 7/8/2021 11/13/2019 3/19/2018 1/12/2017 10/20/2016   PHQ2 Score 0 0 0 0 0 0   PHQ9 Score 0 0 0 0 0 0     He reports feeling tired all the time, for several months, not improving  Ankles pain on and off. She did contact our office and the venous scan was done and it was negative for blood clots on 3/1/2022.   Patient was also referred to lymphedema clinic  Getting pain in the legs, but does not have any pain at this time anymore. Usually the pain is in the back of the ankles. Sometimes swelling in the legs. She is concerned about recurrent pain, I advised her to contact us when she gets pain again. She reports easy bruising and getting random large bruises, currently has a large bruise over the left knee on the medial aspect , and on the left arm, for several months. Denies injury  She says she always feels tired and cold   had 3 miscarriages ages 2.5 months, 4 weeks x 2  I do not see any work-up for miscarriages. She also reports PCOS and irregular menstrual..  Prior prediabetes based on A1c. Denies increased appetite, thirst or polyuria. Morbid obesity per BMI BMI 42.74 kg/M2    Lab Results   Component Value Date    LABA1C 5.7 07/12/2021     Patient is due for hepatitis C screening. Jero Romero 's indication is CDC recommendation. Patient is due for varicella antibody check due to her  age group and CDC recommendation. she denies any rash, or recent illness. she denies any signs or symptoms of chickenpox. Review of Systems   Constitutional: Positive for fatigue. Negative for activity change, appetite change, chills, diaphoresis, fever and unexpected weight change. Respiratory: Negative for cough, chest tightness, shortness of breath and wheezing. Cardiovascular: Positive for leg swelling. Negative for chest pain and palpitations. Gastrointestinal: Negative for abdominal distention, abdominal pain, constipation, diarrhea, nausea and vomiting. Endocrine: Positive for cold intolerance. Negative for heat intolerance, polydipsia, polyphagia and polyuria. Hematological: Bruises/bleeds easily. Psychiatric/Behavioral: Positive for decreased concentration. Negative for dysphoric mood, self-injury, sleep disturbance and suicidal ideas. The patient is not nervous/anxious. Prior to Visit Medications    Medication Sig Taking?  Authorizing Provider ibuprofen (ADVIL;MOTRIN) 800 MG tablet Take 1 tablet by mouth 3 times daily (with meals)  Patient not taking: Reported on 3/21/2022  BRIANNE Wagner CNP   famotidine (PEPCID) 20 MG tablet Take 1 tablet by mouth 2 times daily  Patient not taking: Reported on 2/18/2022  Yulisa Garcia MD   albuterol sulfate HFA (PROVENTIL HFA) 108 (90 Base) MCG/ACT inhaler Inhale 2 puffs into the lungs every 6 hours as needed for Wheezing or Shortness of Breath (cough)  Patient not taking: Reported on 2/18/2022  Yulisa Garcia MD   amphetamine-dextroamphetamine (ADDERALL, 5MG,) 5 MG tablet Take 1 tablet by mouth daily . Patient not taking: Reported on 11/13/2019  BRIANNE Shaw CNP       Social History     Tobacco Use    Smoking status: Former Smoker     Packs/day: 0.25     Types: Cigarettes    Smokeless tobacco: Never Used    Tobacco comment: Patient current vapes   Vaping Use    Vaping Use: Every day   Substance Use Topics    Alcohol use: No     Comment: Rarely.  Drug use: No          PHYSICAL EXAMINATION:    Vital Signs: (As obtained by patient/caregiver or practitioner observation)  -vital signs stable and within normal limits except morbid obesity per BMI, BMI 42.74 kg/M2  Patient-Reported Vitals 3/21/2022   Patient-Reported Weight 249 lbs   Patient-Reported Height 5 ft 4 in   Patient-Reported Temperature -         Last menstrual period 3/4/2022  Intensity of pain is 0 out of 10      Constitutional: [x] Appears well-developed and well-nourished [x] No apparent distress      [x] Abnormal-morbidly obese      Mental status  [x] Alert and awake  [x] Oriented to person/place/time [x]Able to follow commands      Eyes:  EOM    [x]  Normal  [] Abnormal-  Sclera  [x]  Normal  [] Abnormal -         Discharge [x]  None visible  [] Abnormal -    HENT:   [x] Normocephalic, atraumatic.   [] Abnormal   [x] Mouth/Throat: Mucous membranes are moist.     External Ears [x] Normal  [] Abnormal-     Neck: [x] No visualized mass     Pulmonary/Chest: [x] Respiratory effort normal.  [x] No visualized signs of difficulty breathing or respiratory distress        [] Abnormal        Musculoskeletal:   [x] Normal gait with no signs of ataxia         [x] Normal range of motion of neck        [] Abnormal-       Neurological:        [x] No Facial Asymmetry (Cranial nerve 7 motor function) (limited exam to video visit)          [x] No gaze palsy        [] Abnormal-            Skin:        [x] No significant exanthematous lesions or discoloration noted on facial skin         [] Abnormal-            Psychiatric:      [x] No Hallucinations       []Mood is normal      [x]Behavior is normal      [x]Judgment is normal      [x]Thought content is normal       [x] Abnormal-seems anxious, talking fast, jumping from 1 subject to another    Other pertinent observable physical exam findings-very large bruise on the medial aspect of the left knee and on the left arm    Due to this being a TeleHealth encounter, evaluation of the following organ systems is limited: Vitals/Constitutional/EENT/Resp/CV/GI//MS/Neuro/Skin/Heme-Lymph-Imm. I personally reviewed most recent labs reviewed with the patient and all questions fully answered.    Mild prediabetes  Hyperglycemia  Mild hyperlipidemia  Otherwise labs within normal limits        Lab Results   Component Value Date    WBC 6.5 01/12/2022    HGB 13.5 01/12/2022    HCT 41.3 01/12/2022    MCV 81.1 01/12/2022     01/12/2022       Lab Results   Component Value Date     01/12/2022    K 4.2 01/12/2022     01/12/2022    CO2 24 01/12/2022    BUN 9 01/12/2022    CREATININE 0.87 01/12/2022    GLUCOSE 124 01/12/2022    CALCIUM 9.1 01/12/2022      Lab Results   Component Value Date    LABA1C 5.7 07/12/2021         Lab Results   Component Value Date    ALT 14 06/17/2016    AST 15 06/17/2016    ALKPHOS 55 06/17/2016    BILITOT 0.33 06/17/2016       Lab Results   Component Value Date    TSH 0.88 Medication Reason    amphetamine-dextroamphetamine (ADDERALL, 5MG,) 5 MG tablet Stop Taking at Discharge              Gustavo Melendez, was evaluated through a synchronous (real-time) audio-video encounter. The patient (or guardian if applicable) is aware that this is a billable service, which includes applicable co-pays. The virtual visit was conducted with patient's (and/or legal guardian consent). Verbal consent to proceed has been obtained within the past 12 months. The visit was conducted pursuant to the emergency declaration under the [address] [place] [address], [address] [place] [address] authority and the [employer] Act. Patient identification was verified    Patient was alone   Provider was located at primary practice location. The patient was located at home, in a state where the provider was licensed to provide care. On this date 3/22/2022 I have spent  35 minutes reviewing previous notes, test results and face to face with the patient discussing the diagnosis and importance of compliance with the treatment plan as well as documenting on the day of the visit. --Buck Muro MD on 3/23/2022 at 8:09 AM    An electronic signature was used to authenticate this note.

## 2022-03-23 ENCOUNTER — TELEPHONE (OUTPATIENT)
Dept: FAMILY MEDICINE CLINIC | Age: 27
End: 2022-03-23

## 2022-03-23 PROBLEM — E66.01 MORBID OBESITY WITH BMI OF 40.0-44.9, ADULT (HCC): Status: ACTIVE | Noted: 2022-03-23

## 2022-03-28 ENCOUNTER — TELEPHONE (OUTPATIENT)
Dept: FAMILY MEDICINE CLINIC | Age: 27
End: 2022-03-28

## 2022-05-02 DIAGNOSIS — F90.2 ATTENTION DEFICIT HYPERACTIVITY DISORDER (ADHD), COMBINED TYPE: ICD-10-CM

## 2022-05-02 RX ORDER — DEXTROAMPHETAMINE SACCHARATE, AMPHETAMINE ASPARTATE MONOHYDRATE, DEXTROAMPHETAMINE SULFATE AND AMPHETAMINE SULFATE 2.5; 2.5; 2.5; 2.5 MG/1; MG/1; MG/1; MG/1
10 CAPSULE, EXTENDED RELEASE ORAL DAILY
Qty: 30 CAPSULE | Refills: 0 | Status: SHIPPED | OUTPATIENT
Start: 2022-05-02 | End: 2022-06-02 | Stop reason: SDUPTHER

## 2022-05-02 NOTE — TELEPHONE ENCOUNTER
Patient needs an appointment in June for Adderall refill, in person, the office either with me or Justyna.     Adderall is a medication that needs monitoring every 3 months in the office    Future Appointments   Date Time Provider Norbert Diego   9/7/2022  3:30 PM Joselin Mario MD [facility]

## 2022-05-03 NOTE — TELEPHONE ENCOUNTER
Next Appt  With Family Medicine (Justyna Cano, BRIANNE - CNP)  06/02/2022 at 4:00 PM    Pt could not do a in office appt with her work schedule right now

## 2022-05-31 NOTE — PROGRESS NOTES
[facility] at [address] [place] [id number]   O: [phone]  B:[phone]    Sherlie Cockayne is a 28 y.o. female evaluated via telephone on 6/2/2022. CONSENT:  She and/or health care decision maker is aware that that she may receive a bill for this telephone service, depending on her insurance coverage, and has provided verbal consent to proceed: Yes    DOCUMENTATION:  Patient scheduled this appointment today due to Telephone Appointment Visit, Medication Refill, and Acne (PT STATED SHE WILL [address] )    ACNE  Patient c/o worsening acne. Used some clindamycin gel with no success. Wanting some kenneth antibiotics. However, she had not had any of her lab work done. No lft's available. CANDIDAL INTERTRIGO  Patient also has recurrent candidal rash under axillae. Using some creams unable to treat well, recurrent. Requests referral to dermatology. WEIGHT  Patient's BMI is Body mass index is 40.68 kg/m². kg/m2. BMI is decreasing. Losing weigh  Also using Noom  Watching what she eats prior to adderall. Patient understands that this condition increases the patient's risk for chronic conditions. Wt Readings from Last 3 Encounters:   06/02/22 237 lb (107.5 kg)   02/18/22 249 lb (112.9 kg)   01/12/22 240 lb (108.9 kg)     ADD/ADHD Symptoms:    Today she is here for a follow up on a month after adderall. She reports great success. SHE REPORTS:  Purely focus  Able to get hert things done  peperwork done  Not making as many mistakes    Sherlie Cockayne is a 28 y.o. female complains of a several year(s)   history of inattention, hyperactivity, Current treatment: Adderall- 10 mg,   which has been effective. Residual symptoms: none. Medication side effects: None. Patient denies None, anorexia, nausea, vomiting, abdominal pain, palpitations, insomnia, irritability, anxiety and tremor.   ADULT ADHD SELF REPORT SCALE -SCORE  Adult ADHD Self-Report Scale (ASRS) Symptom Checklist 3/21/2022   How often do you make careless mistakes when you have to work on a boring or difficult project? 4   How often do you have difficulty keeping your attention when you are doing boring or repetitive work? 4   How often do you have difficulty concentrating on what people say to you, even when they are speaking to you directly? 4   How often do you have trouble wrapping up the final details of a project, once the challenging parts have been done? 4   How often do you have difficulty getting things in order when you have to do a task that requires organization? 4   When you have a task that requires a lot of thought, how often do you avoid or delay getting started? 4   How often do you misplace or have difficulty finding things at home or at work? 4   How often are you distracted by activity or noise around you? 4   How often do you have problems remembering appointments or obligations? 4   Part A - Total 36   How often do you fidget or squirm with your hands or feet when you have to sit down for a long time? 4   How often do you leave your seat in meetings or other situations in which you are expected to remain seated? 4   How often do you feel restless or fidgety? 4   How often do you have difficulty unwinding and relaxing when you have time to yourself? 4   How often do you feel overly active and compelled to do things, like you were driven by a motor? 2   How often do you find yourself talking too much when you are in social situations? 4   When youre in a conversation, how often do you find yourself finishing the sentences of the people you are talking to, before they can finish them themselves? 2   How often do you have difficulty waiting your turn in situations when turn taking is required? 3   How often do you interrupt others when they are busy?  0   Part B - Total 27      DEPRESSION SCREENING: Negative  PHQ Scores 3/22/2022 7/8/2021 11/13/2019 3/19/2018 1/12/2017 10/20/2016 PHQ2 Score 0 0 0 0 0 0   PHQ9 Score 0 0 0 0 0 0     I communicated with the patient and/or health care decision maker about: PLAN     Review of Systems   Constitutional: Positive for unexpected weight change. Negative for chills and fever. HENT: Negative. Respiratory: Negative. Negative for shortness of breath and wheezing. Cardiovascular: Negative. Negative for chest pain and palpitations. Gastrointestinal: Negative for abdominal pain. Endocrine: Negative. Musculoskeletal: Negative for arthralgias. Skin: Positive for color change and rash. acne   Neurological: Negative for headaches. Psychiatric/Behavioral: Positive for decreased concentration. Negative for sleep disturbance and suicidal ideas. The patient is nervous/anxious. Details of this discussion including any medical advice provided: Under assessment. PHYSICAL EXAM[INSTRUCTIONS:  \"[x]\" Indicates a positive item  \"[]\" Indicates a negative item  -- DELETE ALL ITEMS NOT EXAMINED]  Patient-Reported Vitals 3/21/2022   Patient-Reported Weight 249 lbs   Patient-Reported Height 5 ft 4 in   Patient-Reported Temperature -     Constitutional: [x] No apparent distress      [] Abnormal -   Mental status: [x] Alert and awake  [x] Oriented to person/place/time[] Abnormal -   Pulmonary/Chest: [x] Respiratory effort normal         [] Abnormal -          Psychiatric:       [x] Normal Affect [] Abnormal -        [x] No Hallucinations  Other pertinent observable physical exam findings:-mildly anxious, facial acne, hyperpigmentation bilateral axillae  Controlled Substance Monitoring:  Acute and Chronic Pain Monitoring:   RX Monitoring 5/30/2022   Attestation -   Periodic Controlled Substance Monitoring No signs of potential drug abuse or diversion identified. ASSESSMENT  1. Attention deficit hyperactivity disorder (ADHD), combined type  Improving  Continue therapy  Advised to report CP, SOB, HA, Insomnia  Follow up every 3 months.     - Urine Drug Screen; Future  - amphetamine-dextroamphetamine (ADDERALL XR) 10 MG extended release capsule; Take 1 capsule by mouth daily for 30 days. Dispense: 30 capsule; Refill: 0    2. Acne vulgaris  Failure to Improve  ADVISED TO:  Wash face at least twice a day  Use prescription cleansers  Report for worsening redness, swelling, and increasing numbers of pimples. - Jonathan Wagoner MD, Dermatology, [facility]    3. Candidal intertrigo  Failure to Improve  Advised to Keep site clean and dry. DISCUSSED AND ADVISED TO:  Apply antibiotic ointment sparingly to site for the next few days. Keep open to air as much as possible. Apply dressing if needed. No pool, lakes, hot tubs until fully healed. Call for worsening redness, streaking, swelling, drainage, pain, and fever/chills. 4. Morbid obesity with BMI of 40.0-44.9, adult ([address]  Improving  BMI decreasing  DISCUSSED AND ADVISED TO:  Eat a low-fat and low carbohydrates diet. Avoid fried foods especially fast food. Choose healthier options for snacks. Have 5-6 servings of fruits and vegetables per day. Cut down on eating processed food. Add 30 minutes to 1 hour aerobic exercise for 3-4 days a week. On this date 6/2/2022 I have spent 35 minutes reviewing previous notes, test results and face to face with the patient discussing the diagnosis and importance of compliance with the treatment plan as well as documenting on the day of the visit. Irvin Rodriguez is a 28 y.o. female patient  being evaluated by through a synchronous (real-time) audio-video encounter . The patient (or guardian if applicable) is aware that this is a billable service, which includes applicable co-pays. This Virtual Visit was conducted withpatient's (and/or legal guardian's) consent.  The visit was conducted pursuant tot he emergency declaration under the [address] [facility] [address] waiver authority and the [employer]ations Act. Patient identification was verified,and a caregiver was present when appropriate. The patient was located in a state where the provider was licensed to provide care. This note was completed by using the assistance of a speech-recognition program. However, inadvertent computerized transcription errors may be present. Although every effort was made to ensure accuracy, no guarantees can be provided that every mistake has been identified and corrected by editing.   Electronically signed by BRIANNE Chirinos CNP on 5/30/22 at 8:01 PM EDT

## 2022-06-02 ENCOUNTER — TELEMEDICINE (OUTPATIENT)
Dept: FAMILY MEDICINE CLINIC | Age: 27
End: 2022-06-02
Payer: COMMERCIAL

## 2022-06-02 VITALS — WEIGHT: 237 LBS | BODY MASS INDEX: 40.68 KG/M2

## 2022-06-02 DIAGNOSIS — F90.2 ATTENTION DEFICIT HYPERACTIVITY DISORDER (ADHD), COMBINED TYPE: Primary | ICD-10-CM

## 2022-06-02 DIAGNOSIS — E66.01 MORBID OBESITY WITH BMI OF 40.0-44.9, ADULT (HCC): ICD-10-CM

## 2022-06-02 DIAGNOSIS — L70.0 ACNE VULGARIS: ICD-10-CM

## 2022-06-02 DIAGNOSIS — B37.2 CANDIDAL INTERTRIGO: ICD-10-CM

## 2022-06-02 PROCEDURE — 99214 OFFICE O/P EST MOD 30 MIN: CPT | Performed by: FAMILY MEDICINE

## 2022-06-02 RX ORDER — DEXTROAMPHETAMINE SACCHARATE, AMPHETAMINE ASPARTATE MONOHYDRATE, DEXTROAMPHETAMINE SULFATE AND AMPHETAMINE SULFATE 2.5; 2.5; 2.5; 2.5 MG/1; MG/1; MG/1; MG/1
10 CAPSULE, EXTENDED RELEASE ORAL DAILY
Qty: 30 CAPSULE | Refills: 0 | Status: SHIPPED | OUTPATIENT
Start: 2022-06-02 | End: 2022-07-02 | Stop reason: SDUPTHER

## 2022-06-02 ASSESSMENT — ENCOUNTER SYMPTOMS
ROS SKIN COMMENTS: ACNE
WHEEZING: 0
SHORTNESS OF BREATH: 0
COLOR CHANGE: 1
RESPIRATORY NEGATIVE: 1
ABDOMINAL PAIN: 0

## 2022-06-02 NOTE — PATIENT INSTRUCTIONS
New Updates for Cosme Thornton/ [facility] ([facility]) JANIS    Thank you for choosing US to give you the best care! [facility] ([facility]) is always trying to think of new ways to help their patients. We are asking all patients to try out the new digital registration that is now available through your [facility] account or the new JANIS, [employer] ([facility]). Via the janis you're now able to update your personal and registration information prior to your upcoming appointment. This will save you time once you arrive at the office to check-in, not to mention your information remains safe!! Many other perks come from signing up for an account, such as:   Requesting refills   Scheduling an appointment   Completing an [address] Sending a message to the office/provider   Having access to your medication list   Paying your bill/copay prior to your appointment   Scheduling your yearly mammogram   Review your test results    If you are not familiar with [facility] or the [facility]) JANIS, please ask one of us and we will be happy to answer any questions or help you set-up your account. Your [facility] office,  [facility]    Patient Education        Acne: Care Instructions  Overview  Acne is a skin problem. It shows up as blackheads, whiteheads, and pimples. Acne most often affects the face, neck, and upper body. It occurs when oil anddead skin cells clog the skin's pores. Acne usually starts during the teen years and often lasts into adulthood. Gentle cleansing every day controls most mild acne. If home treatment doesn't work, your doctor may prescribe a cream, an antibiotic, or a stronger medicine called isotretinoin. Sometimes birth control pills help women who have monthlyacne flare-ups. Follow-up care is a key part of your treatment and safety. Be sure to make and go to all appointments, and call your doctor if you are having problems.  It's also a good idea to know your test results and keep alist of the medicines you take. How can you care for yourself at home?  Gently wash your face 1 or 2 times a day with warm (not hot) water and a mild soap or cleanser. Always rinse well.  Use an over-the-counter lotion or gel that contains benzoyl peroxide. Start with a small amount of 2.5% benzoyl peroxide and increase the strength as needed. Benzoyl peroxide works well for acne, but you may need to use it for up to 2 months before your acne starts to improve.  Apply acne cream, lotion, or gel to all the places you get pimples, blackheads, or whiteheads, not just where you have them now. Follow the instructions carefully. If your skin gets too dry and scaly or red and sore, reduce the amount. For the best results, apply medicines as directed. Try not to miss doses.  Do not squeeze or pick pimples and blackheads. This can cause infection and scarring.  Use only oil-free makeup, sunscreen, and other skin care products that will not clog your pores. When should you call for help? Watch closely for changes in your health, and be sure to contact your doctor if:     You have symptoms of infection, such as:  ? Increased pain, swelling, warmth, or redness. ? Red streaks leading from the area. ? Pus draining from the area. ? A fever.      Your acne gets worse or does not improve after 3 months with home treatment.      You are taking the prescription medicine isotretinoin and you feel sad or hopeless, lack energy, or have other signs of depression.      You start to have other symptoms, such as facial hair growth in women. Where can you learn more? Go to [email or web address] and sign in to your [employer] account. Enter V108 in the [facility] box to learn more about \"Acne: Care Instructions. \"     If you do not have an account, please click on the \"Sign Up Now\" link. Current as of: November 15, 2021               Content Version: 13.2  © 4716-1420 Healthwise, Incorporated. Care instructions adapted under license by [facility] ([facility]). If you have questions about a medical condition or this instruction, always ask your healthcare professional. [address] any warranty or liability for your use of this information.

## 2022-06-13 ENCOUNTER — HOSPITAL ENCOUNTER (OUTPATIENT)
Age: 27
Discharge: HOME | End: 2022-06-13
Payer: COMMERCIAL

## 2022-06-13 DIAGNOSIS — R23.3 BRUISES EASILY: ICD-10-CM

## 2022-06-13 DIAGNOSIS — R53.82 CHRONIC FATIGUE: ICD-10-CM

## 2022-06-13 DIAGNOSIS — R73.03 PREDIABETES: ICD-10-CM

## 2022-06-13 DIAGNOSIS — F90.2 ATTENTION DEFICIT HYPERACTIVITY DISORDER (ADHD), COMBINED TYPE: ICD-10-CM

## 2022-06-13 DIAGNOSIS — Z11.59 ENCOUNTER FOR SCREENING FOR OTHER VIRAL DISEASES: ICD-10-CM

## 2022-06-13 LAB
ABSOLUTE EOS #: 0.1 K/UL (ref 0–0.4)
ABSOLUTE LYMPH #: 1.8 K/UL (ref 1–4.8)
ABSOLUTE MONO #: 0.3 K/UL (ref 0.1–1.3)
ALBUMIN SERPL-MCNC: 4.1 G/DL (ref 3.5–5.2)
ALP BLD-CCNC: 65 U/L (ref 35–104)
ALT SERPL-CCNC: 10 U/L (ref 5–33)
AMPHETAMINE SCREEN URINE: NEGATIVE
ANION GAP SERPL CALCULATED.3IONS-SCNC: 9 MMOL/L (ref 9–17)
AST SERPL-CCNC: 13 U/L
BARBITURATE SCREEN URINE: NEGATIVE
BASOPHILS # BLD: 0 % (ref 0–2)
BASOPHILS ABSOLUTE: 0 K/UL (ref 0–0.2)
BENZODIAZEPINE SCREEN, URINE: NEGATIVE
BILIRUB SERPL-MCNC: 0.4 MG/DL (ref 0.3–1.2)
BUN BLDV-MCNC: 9 MG/DL (ref 6–20)
C-REACTIVE PROTEIN: <3 MG/L (ref 0–5)
CALCIUM SERPL-MCNC: 9 MG/DL (ref 8.6–10.4)
CANNABINOID SCREEN URINE: NEGATIVE
CHLORIDE BLD-SCNC: 104 MMOL/L (ref 98–107)
CO2: 26 MMOL/L (ref 20–31)
COCAINE METABOLITE, URINE: NEGATIVE
CREAT SERPL-MCNC: 1.01 MG/DL (ref 0.5–0.9)
EOSINOPHILS RELATIVE PERCENT: 1 % (ref 0–4)
ESTIMATED AVERAGE GLUCOSE: 105 MG/DL
GFR AFRICAN AMERICAN: >60 ML/MIN
GFR NON-AFRICAN AMERICAN: >60 ML/MIN
GFR SERPL CREATININE-BSD FRML MDRD: ABNORMAL ML/MIN/{1.73_M2}
GLUCOSE BLD-MCNC: 107 MG/DL (ref 70–99)
HBA1C MFR BLD: 5.3 % (ref 4–6)
HCT VFR BLD CALC: 40.6 % (ref 36–46)
HEMOGLOBIN: 13.4 G/DL (ref 12–16)
HEPATITIS C ANTIBODY: NONREACTIVE
LYMPHOCYTES # BLD: 29 % (ref 24–44)
MCH RBC QN AUTO: 26.6 PG (ref 26–34)
MCHC RBC AUTO-ENTMCNC: 32.9 G/DL (ref 31–37)
MCV RBC AUTO: 80.8 FL (ref 80–100)
METHADONE SCREEN, URINE: NEGATIVE
MONOCYTES # BLD: 5 % (ref 1–7)
OPIATES, URINE: NEGATIVE
OXYCODONE SCREEN URINE: NEGATIVE
PARTIAL THROMBOPLASTIN TIME: 30.2 SEC (ref 24–36)
PDW BLD-RTO: 13.2 % (ref 11.5–14.9)
PHENCYCLIDINE, URINE: NEGATIVE
PLATELET # BLD: 255 K/UL (ref 150–450)
PMV BLD AUTO: 9.1 FL (ref 6–12)
POTASSIUM SERPL-SCNC: 4.1 MMOL/L (ref 3.7–5.3)
RBC # BLD: 5.03 M/UL (ref 4–5.2)
SEDIMENTATION RATE, ERYTHROCYTE: 3 MM/HR (ref 0–20)
SEG NEUTROPHILS: 65 % (ref 36–66)
SEGMENTED NEUTROPHILS ABSOLUTE COUNT: 3.9 K/UL (ref 1.3–9.1)
SODIUM BLD-SCNC: 139 MMOL/L (ref 135–144)
TEST INFORMATION: NORMAL
TOTAL PROTEIN: 6.7 G/DL (ref 6.4–8.3)
TSH SERPL DL<=0.05 MIU/L-ACNC: 0.93 UIU/ML (ref 0.3–5)
VITAMIN D 25-HYDROXY: 41.1 NG/ML
WBC # BLD: 6.1 K/UL (ref 3.5–11)

## 2022-06-13 PROCEDURE — 80053 COMPREHEN METABOLIC PANEL: CPT

## 2022-06-13 PROCEDURE — 86038 ANTINUCLEAR ANTIBODIES: CPT

## 2022-06-13 PROCEDURE — 86140 C-REACTIVE PROTEIN: CPT

## 2022-06-13 PROCEDURE — 85025 COMPLETE CBC W/AUTO DIFF WBC: CPT

## 2022-06-13 PROCEDURE — 36415 COLL VENOUS BLD VENIPUNCTURE: CPT

## 2022-06-13 PROCEDURE — 86225 DNA ANTIBODY NATIVE: CPT

## 2022-06-13 PROCEDURE — 86787 VARICELLA-ZOSTER ANTIBODY: CPT

## 2022-06-13 PROCEDURE — 86803 HEPATITIS C AB TEST: CPT

## 2022-06-13 PROCEDURE — 83036 HEMOGLOBIN GLYCOSYLATED A1C: CPT

## 2022-06-13 PROCEDURE — 84443 ASSAY THYROID STIM HORMONE: CPT

## 2022-06-13 PROCEDURE — 82306 VITAMIN D 25 HYDROXY: CPT

## 2022-06-13 PROCEDURE — 80307 DRUG TEST PRSMV CHEM ANLYZR: CPT

## 2022-06-13 PROCEDURE — 85652 RBC SED RATE AUTOMATED: CPT

## 2022-06-13 PROCEDURE — 85730 THROMBOPLASTIN TIME PARTIAL: CPT

## 2022-06-14 LAB
ANTI DNA DOUBLE STRANDED: 0.6 IU/ML
ANTI-NUCLEAR ANTIBODY (ANA): NEGATIVE
ENA ANTIBODIES SCREEN: 0.1 U/ML

## 2022-06-14 NOTE — RESULT ENCOUNTER NOTE
Please notify patient: Mild increased blood glucose 107, low-carb diet is advisable, last A1c was normal,resolved prediabetes  Lab Results       Component                Value               Date                       LABA1C                   5.3                 06/13/2022                 LABA1C                   5.7                 07/12/2021            Mild dehydration of the kidneys, to avoid ibuprofen, naproxen, and dehydration, to drink 8 x 8 ounce glasses of water every day.       Otherwise labs within normal limits  continue current treatment    Future Appointments  8/15/2022  10:45 AM   [facility] Grider [facility]-C          [facility]  9/7/2022   3:30 PM    Arabella Mendoza MD     [facility]

## 2022-06-14 NOTE — RESULT ENCOUNTER NOTE
Diana comment sent to patient.   NITO negative this means no autoimmune disorder, no lupus  Future Appointments  8/15/2022  10:45 AM   Andrew Wilson PA-C          [facility]  9/7/2022   3:30 PM    Phillip Medel MD     [facility]

## 2022-06-15 ENCOUNTER — TELEPHONE (OUTPATIENT)
Dept: FAMILY MEDICINE CLINIC | Age: 27
End: 2022-06-15

## 2022-06-15 LAB — VZV IGG SER QL IA: 3.01

## 2022-06-15 NOTE — RESULT ENCOUNTER NOTE
Please notify patient: Urine drug screen did not show Adderall, please check with patient why not?   She is on it  and there was no delay in refilling it, it should have shown in the drug screen     06/02/2022 06/02/2022   2  Adderall Xr 10 Mg Capsule   30.00  30  Re Gau  [id number]  Ria ([id number])  0   [place] Ins  [place]    05/02/2022 05/02/2022   1  Adderall Xr 10 Mg Capsule   30.00  30  Fl Chi  [id number]  Ria ([id number])  0   [place] Ins  [place]    03/31/2022 03/22/2022   1  Adderall Xr 10 Mg Capsule   30.00  30  Fl Chi  [id number]  Ria ([id number])  0   [place] Ins  [place]         Future Appointments  8/15/2022  10:45 AM   Stuart Freeman PA-C          [facility]  9/7/2022   3:30 PM    Annie Mcclendon MD     [facility]

## 2022-06-15 NOTE — RESULT ENCOUNTER NOTE
Mychart comment sent to patient.   Immune to chickenpox, normal  Future Appointments  8/15/2022  10:45 AM   Barb Marshall PA-C           derm             [facility]  9/7/2022   3:30 PM    Remigio Garcia MD     [facility]               [facility]

## 2022-06-15 NOTE — TELEPHONE ENCOUNTER
06/02/2022 06/02/2022   2  Adderall Xr 10 Mg Capsule   30.00  30  Re [facility]  [phone]  Ria ([id number])  0   [place] Ins  [place]     05/02/2022 05/02/2022   1  Adderall Xr 10 Mg Capsule   30.00  30  Fl Chi  [phone]  Ria ([id number])  0   [place] Ins  [place]     03/31/2022 03/22/2022   1  Adderall Xr 10 Mg Capsule   30.00  30  Fl Chi  [phone]  Ria ([id number])  0   Comm Ins  [place]

## 2022-06-16 NOTE — TELEPHONE ENCOUNTER
Patient called and stated that she Is indeed taking her adderal medication as prescribed. She some days skip on mondays, sat, sun. The day she did go do her drug urine screen. She took it that morning 8:00 am on 06/13/22. She did see that urine was collected on 8:46 am. She does not want to interfere with her medication refills for in the future. Please advise.

## 2022-07-02 DIAGNOSIS — F90.2 ATTENTION DEFICIT HYPERACTIVITY DISORDER (ADHD), COMBINED TYPE: ICD-10-CM

## 2022-07-05 RX ORDER — DEXTROAMPHETAMINE SACCHARATE, AMPHETAMINE ASPARTATE MONOHYDRATE, DEXTROAMPHETAMINE SULFATE AND AMPHETAMINE SULFATE 2.5; 2.5; 2.5; 2.5 MG/1; MG/1; MG/1; MG/1
10 CAPSULE, EXTENDED RELEASE ORAL DAILY
Qty: 30 CAPSULE | Refills: 0 | Status: SHIPPED | OUTPATIENT
Start: 2022-07-05 | End: 2022-08-08 | Stop reason: SDUPTHER

## 2022-07-05 NOTE — TELEPHONE ENCOUNTER
Da Loya     AM    10:45 AM  Note  Patient called and stated that she Is indeed taking her adderal medication as prescribed. She some days skip on mondays, sat, sun.     The day she did go do her drug urine screen. She took it that morning 8:00 am on 06/13/22. She did see that urine was collected on 8:46 am. She does not want to interfere with her medication refills for in the future. Please advise.

## 2022-08-08 DIAGNOSIS — F90.2 ATTENTION DEFICIT HYPERACTIVITY DISORDER (ADHD), COMBINED TYPE: ICD-10-CM

## 2022-08-09 RX ORDER — DEXTROAMPHETAMINE SACCHARATE, AMPHETAMINE ASPARTATE MONOHYDRATE, DEXTROAMPHETAMINE SULFATE AND AMPHETAMINE SULFATE 2.5; 2.5; 2.5; 2.5 MG/1; MG/1; MG/1; MG/1
10 CAPSULE, EXTENDED RELEASE ORAL DAILY
Qty: 30 CAPSULE | Refills: 0 | Status: SHIPPED | OUTPATIENT
Start: 2022-08-09 | End: 2022-09-07 | Stop reason: SDUPTHER

## 2022-08-09 NOTE — TELEPHONE ENCOUNTER
Very important, please contact the patient and check if she is pregnant or not, she should not get pregnant on this medication.     Her status of pregnancy needs to be updated in OB flowsheet in the chart    FYI    Pregnancy Warning: amphetamine-dextroamphetamine  Not recommended for Pregnancy  Patient pregnancy status is unknown    Details

## 2022-08-10 NOTE — TELEPHONE ENCOUNTER
Pt returned call, writer ask pt if she was pregnant and pt question why does provider need to know if she is pregnant. Writer adv of the note documented regarding that it is not recommended for pregnancy or get pregnant on the medication. Pt was hesitant with her response regarding if she was pregnant or not and asked is the medication not good for her or the baby.

## 2022-08-15 ENCOUNTER — OFFICE VISIT (OUTPATIENT)
Dept: DERMATOLOGY | Age: 27
End: 2022-08-15
Payer: COMMERCIAL

## 2022-08-15 VITALS
WEIGHT: 227.2 LBS | OXYGEN SATURATION: 98 % | BODY MASS INDEX: 38.79 KG/M2 | TEMPERATURE: 97.9 F | HEART RATE: 91 BPM | DIASTOLIC BLOOD PRESSURE: 79 MMHG | SYSTOLIC BLOOD PRESSURE: 113 MMHG | HEIGHT: 64 IN

## 2022-08-15 DIAGNOSIS — L40.8 SEBOPSORIASIS: ICD-10-CM

## 2022-08-15 DIAGNOSIS — L70.0 ACNE VULGARIS: Primary | ICD-10-CM

## 2022-08-15 DIAGNOSIS — L30.9 DERMATITIS, UNSPECIFIED: ICD-10-CM

## 2022-08-15 PROCEDURE — 99204 OFFICE O/P NEW MOD 45 MIN: CPT | Performed by: PHYSICIAN ASSISTANT

## 2022-08-15 RX ORDER — KETOCONAZOLE 20 MG/G
CREAM TOPICAL
Qty: 60 G | Refills: 2 | Status: SHIPPED | OUTPATIENT
Start: 2022-08-15 | End: 2022-08-15

## 2022-08-15 RX ORDER — KETOCONAZOLE 20 MG/G
CREAM TOPICAL
Qty: 60 G | Refills: 2 | Status: SHIPPED | OUTPATIENT
Start: 2022-08-15

## 2022-08-15 RX ORDER — KETOCONAZOLE 20 MG/ML
SHAMPOO TOPICAL
Qty: 120 ML | Refills: 2 | Status: SHIPPED | OUTPATIENT
Start: 2022-08-15

## 2022-08-15 RX ORDER — FLUOCINONIDE TOPICAL SOLUTION USP, 0.05% 0.5 MG/ML
SOLUTION TOPICAL
Qty: 60 ML | Refills: 2 | Status: SHIPPED | OUTPATIENT
Start: 2022-08-15

## 2022-08-15 NOTE — PROGRESS NOTES
Dermatology Patient Note  [address]  [address] [place] [address]  Dept: [phone]  Dept Fax: [phone]      VISITDATE: 8/15/2022   REFERRING PROVIDER: Hallie Ventura APR*      Lis Best is a 28 y.o. female  who presents today in the office for:    New Patient and Acne (Pt states her face breaks out with acne. Pt states she has a rash under lt armpit. Pt also has very dry scalp with dandruff and sores )      HISTORY OF PRESENT ILLNESS:  As above. Left axilla is mildly pruritic. States mild scale on elbows/knees with winter. Pt states that acne does have a cyclical nature to flares, and is worse premenstrual.  Of note, she is currently late for current menstruation, and therefore, is uncertain of pregnancy status.     MEDICAL PROBLEMS:  Patient Active Problem List    Diagnosis Date Noted    Acne vulgaris 2022     Priority: Medium    Morbid obesity with BMI of 40.0-44.9, adult ([facility] 2022    Attention deficit hyperactivity disorder (ADHD), combined type 2022    Bruises easily 2022    Prediabetes 2022    Weight gain 2021    Mass of left lower leg 2021    Numbness and tingling of both feet 2021    Herpes simplex virus (HSV) stromal keratitis of both eyes 2020    Abnormal findings on  screening of mother 2019    Herpes simplex 2019    Impaired glucose tolerance in pregnancy 2019    Threatened miscarriage 2019    Encounter for surveillance of vaginal ring hormonal contraceptive device 2017    Herpes zoster without complication     Seasonal allergic rhinitis 2017    Pruritic intertrigo 2017    Urticaria 2016    Chronic midline thoracic back pain 12/15/2016    Chronic midline low back pain without sciatica 12/15/2016    Candidal intertrigo     Folliculitis     Attention deficit hyperactivity disorder (ADHD), predominantly inattentive type 10/20/2016    Fatigue 06/17/2016    Tattoo of skin 06/16/2016    Multiple body piercings 06/16/2016    Hair loss 06/16/2016    Lumbar pain 03/10/2015    Low TAE-A 01/22/2015       CURRENT MEDICATIONS:   Current Outpatient Medications   Medication Sig Dispense Refill    ketoconazole (NIZORAL) 2 % shampoo Apply 3-4 times weekly to scalp, leave on for five minutes prior to washing off 120 mL 2    fluocinonide (LIDEX) 0.05 % external solution Apply to scalp twice daily, as needed, for itching. 60 mL 2    ketoconazole (NIZORAL) 2 % cream Apply to left axilla BID x 14 days 60 g 2    amphetamine-dextroamphetamine (ADDERALL XR) 10 MG extended release capsule Take 1 capsule by mouth in the morning for 30 days. 30 capsule 0     Current Facility-Administered Medications   Medication Dose Route Frequency Provider Last Rate Last Admin    diphenhydrAMINE (BENADRYL) capsule 50 mg  50 mg Oral Nightly PRN Arsh Mario APRN - CNP           ALLERGIES:   Allergies   Allergen Reactions    Seasonal Other (See Comments)     Watery Eyes, Sneezing. SOCIAL HISTORY:  Social History     Tobacco Use    Smoking status: Former     Packs/day: 0.25     Types: Cigarettes    Smokeless tobacco: Never    Tobacco comments:     Patient current vapes   Substance Use Topics    Alcohol use: Yes     Comment: Rarely. Pertinent ROS:  Review of Systems  Skin: Denies any new changing, growing or bleeding lesions or rashes except as described in the HPI   Constitutional: Denies fevers, chills, and malaise.     PHYSICAL EXAM:   /79   Pulse 91   Temp 97.9 °F (36.6 °C) (Temporal)   Ht 5' 4\" (1.626 m)   Wt 227 lb 3.2 oz (103.1 kg)   SpO2 98%   BMI 39.00 kg/m²     The patient is generally well appearing, well nourished, alert and conversational. Affect is normal.    Cutaneous Exam:  Physical Exam  Focused exam of face, scalp, and left axilla was performed    Facial covering was removed during examination. Diagnoses/exam findings/medical history pertinent to this visit are listed below:    Assessment:   Diagnosis Orders   1. Acne vulgaris        2. Dermatitis, unspecified  ketoconazole (NIZORAL) 2 % cream    DISCONTINUED: ketoconazole (NIZORAL) 2 % cream      3. Sebopsoriasis  ketoconazole (NIZORAL) 2 % shampoo    fluocinonide (LIDEX) 0.05 % external solution           Plan:  1. Acne vulgaris  - chronic illness with progression and/or exacerbation   - DD/T the uncertainty of pregnancy status, we agreed to withhold Tx until her next visit. I feel that spironolactone 50 mg qd would be her ideal Tx plan. - Discussed spironolactone with patient. The patient was informed of common side effects such as increased urination/urinary frequency, menstrual irregularities with mid-cycle spotting, breast tenderness, decreased libido, fatigue, headache, and dizziness. Patient informed to stop taking medication and to immediately report any new onset muscle cramps or weakness, or palpitations. Patient informed that pregnancy needs to be avoided while on this medication. Discussed risk of elevated potassium and the need to stay away from potassium supplements while on the medication. 2. Dermatitis, unspecified (tinea cruris vs inverse Ps)  - We will treat as inverse Ps if fails Tx  - ketoconazole (NIZORAL) 2 % cream; Apply to left axilla BID x 14 days  Dispense: 60 g; Refill: 2    3. Sebopsoriasis  - chronic illness with progression and/or exacerbation   - ketoconazole (NIZORAL) 2 % shampoo; Apply 3-4 times weekly to scalp, leave on for five minutes prior to washing off  Dispense: 120 mL; Refill: 2  - fluocinonide (LIDEX) 0.05 % external solution; Apply to scalp twice daily, as needed, for itching.   Dispense: 60 mL; Refill: 2      RTC 4W    Future Appointments   Date Time Provider Norbert Brandie   9/7/2022  3:30 PM Ellie Duarte MD [facility]   9/12/2022 10:30 AM Cindy Arias PA-C  derm [facility] [[email or web address]]         There are no Patient Instructions on file for this visit.       Electronically signed by Mali Escobar PA-C on 8/15/22 at 12:02 PM EDT

## 2022-08-31 ENCOUNTER — HOSPITAL ENCOUNTER (EMERGENCY)
Age: 27
Discharge: HOME | End: 2022-09-01
Attending: EMERGENCY MEDICINE
Payer: COMMERCIAL

## 2022-08-31 VITALS
WEIGHT: 225 LBS | DIASTOLIC BLOOD PRESSURE: 77 MMHG | BODY MASS INDEX: 38.41 KG/M2 | SYSTOLIC BLOOD PRESSURE: 122 MMHG | TEMPERATURE: 98.5 F | RESPIRATION RATE: 19 BRPM | HEART RATE: 53 BPM | OXYGEN SATURATION: 98 % | HEIGHT: 64 IN

## 2022-08-31 DIAGNOSIS — R42 VERTIGO: Primary | ICD-10-CM

## 2022-08-31 LAB
ABSOLUTE EOS #: 0.1 K/UL (ref 0–0.4)
ABSOLUTE LYMPH #: 3.2 K/UL (ref 1–4.8)
ABSOLUTE MONO #: 0.6 K/UL (ref 0.1–1.3)
ANION GAP SERPL CALCULATED.3IONS-SCNC: 9 MMOL/L (ref 9–17)
BACTERIA: ABNORMAL
BASOPHILS # BLD: 0 % (ref 0–2)
BASOPHILS ABSOLUTE: 0 K/UL (ref 0–0.2)
BILIRUBIN URINE: NEGATIVE
BUN BLDV-MCNC: 8 MG/DL (ref 6–20)
CALCIUM SERPL-MCNC: 9.6 MG/DL (ref 8.6–10.4)
CASTS UA: ABNORMAL /LPF
CASTS UA: ABNORMAL /LPF
CHLORIDE BLD-SCNC: 104 MMOL/L (ref 98–107)
CO2: 26 MMOL/L (ref 20–31)
COLOR: ABNORMAL
CREAT SERPL-MCNC: 1.06 MG/DL (ref 0.5–0.9)
EOSINOPHILS RELATIVE PERCENT: 1 % (ref 0–4)
EPITHELIAL CELLS UA: ABNORMAL /HPF
GFR AFRICAN AMERICAN: >60 ML/MIN
GFR NON-AFRICAN AMERICAN: >60 ML/MIN
GFR SERPL CREATININE-BSD FRML MDRD: ABNORMAL ML/MIN/{1.73_M2}
GLUCOSE BLD-MCNC: 93 MG/DL (ref 70–99)
GLUCOSE BLD-MCNC: 96 MG/DL (ref 65–105)
GLUCOSE URINE: NEGATIVE
HCG(URINE) PREGNANCY TEST: NEGATIVE
HCT VFR BLD CALC: 40 % (ref 36–46)
HEMOGLOBIN: 12.9 G/DL (ref 12–16)
KETONES, URINE: ABNORMAL
LEUKOCYTE ESTERASE, URINE: NEGATIVE
LYMPHOCYTES # BLD: 35 % (ref 24–44)
MCH RBC QN AUTO: 26.1 PG (ref 26–34)
MCHC RBC AUTO-ENTMCNC: 32.2 G/DL (ref 31–37)
MCV RBC AUTO: 81 FL (ref 80–100)
MONOCYTES # BLD: 6 % (ref 1–7)
MUCUS: ABNORMAL
NITRITE, URINE: NEGATIVE
PDW BLD-RTO: 13.8 % (ref 11.5–14.9)
PH UA: 5.5 (ref 5–8)
PLATELET # BLD: 265 K/UL (ref 150–450)
PMV BLD AUTO: 9.4 FL (ref 6–12)
POTASSIUM SERPL-SCNC: 4.1 MMOL/L (ref 3.7–5.3)
PROTEIN UA: NEGATIVE
RBC # BLD: 4.94 M/UL (ref 4–5.2)
RBC UA: ABNORMAL /HPF
SEG NEUTROPHILS: 58 % (ref 36–66)
SEGMENTED NEUTROPHILS ABSOLUTE COUNT: 5.5 K/UL (ref 1.3–9.1)
SODIUM BLD-SCNC: 139 MMOL/L (ref 135–144)
SPECIFIC GRAVITY UA: 1.03 (ref 1–1.03)
TURBIDITY: ABNORMAL
URINE HGB: NEGATIVE
UROBILINOGEN, URINE: NORMAL
WBC # BLD: 9.3 K/UL (ref 3.5–11)
WBC UA: ABNORMAL /HPF

## 2022-08-31 PROCEDURE — 86901 BLOOD TYPING SEROLOGIC RH(D): CPT

## 2022-08-31 PROCEDURE — 86850 RBC ANTIBODY SCREEN: CPT

## 2022-08-31 PROCEDURE — 86870 RBC ANTIBODY IDENTIFICATION: CPT

## 2022-08-31 PROCEDURE — 93005 ELECTROCARDIOGRAM TRACING: CPT

## 2022-08-31 PROCEDURE — 99284 EMERGENCY DEPT VISIT MOD MDM: CPT

## 2022-08-31 PROCEDURE — 85025 COMPLETE CBC W/AUTO DIFF WBC: CPT

## 2022-08-31 PROCEDURE — 81025 URINE PREGNANCY TEST: CPT

## 2022-08-31 PROCEDURE — 82947 ASSAY GLUCOSE BLOOD QUANT: CPT

## 2022-08-31 PROCEDURE — 86900 BLOOD TYPING SEROLOGIC ABO: CPT

## 2022-08-31 PROCEDURE — 81001 URINALYSIS AUTO W/SCOPE: CPT

## 2022-08-31 PROCEDURE — 36415 COLL VENOUS BLD VENIPUNCTURE: CPT

## 2022-08-31 PROCEDURE — 80048 BASIC METABOLIC PNL TOTAL CA: CPT

## 2022-08-31 PROCEDURE — 6370000000 HC RX 637 (ALT 250 FOR IP): Performed by: EMERGENCY MEDICINE

## 2022-08-31 RX ORDER — MECLIZINE HYDROCHLORIDE 25 MG/1
25 TABLET ORAL ONCE
Status: COMPLETED | OUTPATIENT
Start: 2022-08-31 | End: 2022-08-31

## 2022-08-31 RX ADMIN — MECLIZINE HYDROCHLORIDE 25 MG: 25 TABLET ORAL at 22:01

## 2022-08-31 NOTE — ED TRIAGE NOTES
Mode of arrival (squad #, walk in, police, etc) : walk in        Chief complaint(s): Dizziness, near syncope        Arrival Note (brief scenario, treatment PTA, etc). : Pt brought in for eval of dizziness with near syncope at work. Pt denies head injury and denies any changes at home. Pt states she felt fine yesterday. C= \"Have you ever felt that you should Cut down on your drinking? \"  No  A= \"Have people Annoyed you by criticizing your drinking? \"  No  G= \"Have you ever felt bad or Guilty about your drinking? \"  No  E= \"Have you ever had a drink as an Eye-opener first thing in the morning to steady your nerves or to help a hangover? \"  No      Deferred []      Reason for deferring: N/A    *If yes to two or more: probable alcohol abuse. *

## 2022-09-01 LAB
ABO/RH: NORMAL
ANTIBODY IDENTIFICATION: NORMAL
ANTIBODY SCREEN: POSITIVE
ARM BAND NUMBER: NORMAL
BLOOD BANK COMMENT: NORMAL
EXPIRATION DATE: NORMAL
PREWARM ANTIBODY SCREEN: NEGATIVE

## 2022-09-01 RX ORDER — MECLIZINE HCL 12.5 MG/1
12.5 TABLET ORAL 3 TIMES DAILY PRN
Qty: 15 TABLET | Refills: 0 | Status: SHIPPED | OUTPATIENT
Start: 2022-09-01 | End: 2022-09-11

## 2022-09-01 ASSESSMENT — ENCOUNTER SYMPTOMS
NAUSEA: 0
BLOOD IN STOOL: 0

## 2022-09-01 NOTE — ED PROVIDER NOTES
[facility] ENCOUNTER    Pt Name: Crispin Self  MRN: [id number]  [place] 1995  Date of evaluation: 9/1/22  CHIEF COMPLAINT       Chief Complaint   Patient presents with    Dizziness     HISTORY OF PRESENT ILLNESS     Dizziness  Quality:  Head spinning and lightheadedness  Severity:  Moderate  Onset quality:  Sudden  Duration:  2 days  Timing:  Intermittent  Progression:  Unable to specify  Chronicity:  New  Context: standing up    Relieved by:  Being still  Worsened by:  Standing up  Ineffective treatments:  None tried  Associated symptoms: no blood in stool, no chest pain and no nausea      Patient notes she is a G5, P1 with lightheadedness possible near syncope today. Patient she was pregnant 2 weeks ago and has had negative pregnancy test and bleeding since then. Patient has a history of PCOS. REVIEW OF SYSTEMS     Review of Systems   Cardiovascular:  Negative for chest pain. Gastrointestinal:  Negative for blood in stool and nausea. Neurological:  Positive for dizziness. All other systems reviewed and are negative. PASTMEDICAL HISTORY     Past Medical History:   Diagnosis Date    Attention deficit hyperactivity disorder (ADHD), predominantly inattentive type 10/20/2016    Chronic back pain     Obesity     Ovarian cyst, bilateral     see's gynecology, Dr John Bocanegra    Seasonal allergic rhinitis 2/22/2017     Past Problem List  Patient Active Problem List   Diagnosis Code    Low TAE-A O35. 8XX0    Lumbar pain M54.50    Tattoo of skin L81.8    Multiple body piercings Z78.9    Hair loss L65.9    Fatigue R53.83    Attention deficit hyperactivity disorder (ADHD), predominantly inattentive type F90.0    Candidal intertrigo N01.0    Folliculitis W44.0    Chronic midline thoracic back pain M54.6, G89.29    Chronic midline low back pain without sciatica M54.50, G89.29    Urticaria L50.9    Pruritic intertrigo L30.4    Seasonal allergic rhinitis J30.2    Herpes zoster without complication L47.4 Encounter for surveillance of vaginal ring hormonal contraceptive device Z30.44    Abnormal findings on  screening of mother O28.9    Herpes simplex B00.9    Impaired glucose tolerance in pregnancy O99.810    Threatened miscarriage O20.0    Herpes simplex virus (HSV) stromal keratitis of both eyes B00.52    Weight gain R63.5    Mass of left lower leg R22.42    Numbness and tingling of both feet R20.0, R20.2    Attention deficit hyperactivity disorder (ADHD), combined type F90.2    Bruises easily R23.8    Prediabetes R73.03    Morbid obesity with BMI of 40.0-44.9, adult ([facility]) E66.01, Z68.41    Acne vulgaris L70.0     SURGICAL HISTORY       Past Surgical History:   Procedure Laterality Date    TEAR DUCT SURGERY Bilateral age 21 months    TONSILLECTOMY AND ADENOIDECTOMY  age 10     CURRENT MEDICATIONS       Discharge Medication List as of 2022 12:44 AM        CONTINUE these medications which have NOT CHANGED    Details   ketoconazole (NIZORAL) 2 % shampoo Apply 3-4 times weekly to scalp, leave on for five minutes prior to washing off, Disp-120 mL, R-2, Normal      fluocinonide (LIDEX) 0.05 % external solution Apply to scalp twice daily, as needed, for itching., Disp-60 mL, R-2, Normal      ketoconazole (NIZORAL) 2 % cream Apply to left axilla BID x 14 days, Disp-60 g, R-2, Normal      amphetamine-dextroamphetamine (ADDERALL XR) 10 MG extended release capsule Take 1 capsule by mouth in the morning for 30 days. , Disp-30 capsule, R-0Normal           ALLERGIES     is allergic to seasonal.  FAMILY HISTORY     She indicated that her mother is alive. She indicated that her father is alive. She indicated that all of her four sisters are alive. She indicated that her brother is alive. She indicated that her maternal grandmother is alive. She indicated that her maternal grandfather is alive. She indicated that her paternal grandmother is alive. She indicated that her paternal grandfather is .      SOCIAL HISTORY       Social History     Tobacco Use    Smoking status: Former     Packs/day: 0.25     Types: Cigarettes    Smokeless tobacco: Never    Tobacco comments:     Patient current vapes   Vaping Use    Vaping Use: Every day   Substance Use Topics    Alcohol use: Yes     Comment: Rarely. Drug use: No     PHYSICAL EXAM     INITIAL VITALS: /77   Pulse 53   Temp 98.5 °F (36.9 °C)   Resp 19   Ht 5' 4\" (1.626 m)   Wt 225 lb (102.1 kg)   SpO2 98%   BMI 38.62 kg/m²    Physical Exam  Constitutional:       General: She is not in acute distress. Appearance: Normal appearance. She is well-developed. She is not diaphoretic. HENT:      Head: Normocephalic and atraumatic. Right Ear: External ear normal.      Left Ear: External ear normal.      Nose: Nose normal. No congestion. Mouth/Throat:      Mouth: Mucous membranes are moist.      Pharynx: Oropharynx is clear. Eyes:      General:         Right eye: No discharge. Left eye: No discharge. Conjunctiva/sclera: Conjunctivae normal.      Pupils: Pupils are equal, round, and reactive to light. Neck:      Trachea: No tracheal deviation. Cardiovascular:      Rate and Rhythm: Normal rate and regular rhythm. Pulses: Normal pulses. Heart sounds: Normal heart sounds. Pulmonary:      Effort: Pulmonary effort is normal. No respiratory distress. Breath sounds: Normal breath sounds. No stridor. No wheezing or rales. Abdominal:      Palpations: Abdomen is soft. Tenderness: There is no abdominal tenderness. There is no guarding or rebound. Musculoskeletal:         General: No tenderness or deformity. Normal range of motion. Cervical back: Normal range of motion and neck supple. Skin:     General: Skin is warm and dry. Capillary Refill: Capillary refill takes less than 2 seconds. Findings: No erythema or rash. Neurological:      General: No focal deficit present.       Mental Status: She is alert and oriented to person, place, and time. Coordination: Coordination normal.   Psychiatric:         Mood and Affect: Mood normal.         Behavior: Behavior normal.         Thought Content: Thought content normal.         Judgment: Judgment normal.       MEDICAL DECISION MAKING:     Patient was seen and examined at bedside soon after arrival to the emergency department. Chart was reviewed include history and physical examination was performed. Presentation is consistent with vertigo type symptoms and patient was treated with supportive care medications and improved on walk test.  The patient had normal hemoglobin and normal kidney function as well as negative pregnancy testing. UA appears contaminated. Patient has no urinary symptoms. Patient has generalized abdominal cramping consistent with PCOS. CRITICAL CARE:       PROCEDURES:    Procedures    DIAGNOSTIC RESULTS   EKG:All EKG's are interpreted by the Emergency Department Physician who either signs or Co-signs this chart in the absence of a cardiologist.        RADIOLOGY:All plain film, CT, MRI, and formal ultrasound images (except ED bedside ultrasound) are read by the radiologist, see reports below, unless otherwisenoted in MDM or here. No orders to display     LABS: All lab results were reviewed by myself, and all abnormals are listed below.   Labs Reviewed   BASIC METABOLIC PANEL - Abnormal; Notable for the following components:       Result Value    Creatinine 1.06 (*)     All other components within normal limits   URINALYSIS - Abnormal; Notable for the following components:    Color, UA Dark Yellow (*)     Turbidity UA Cloudy (*)     Ketones, Urine SMALL (*)     All other components within normal limits   MICROSCOPIC URINALYSIS - Abnormal; Notable for the following components:    Bacteria, UA MODERATE (*)     Mucus, UA 3+ (*)     All other components within normal limits   CBC WITH AUTO DIFFERENTIAL   PREGNANCY, URINE   POC GLUCOSE FINGERSTICK TYPE AND SCREEN       EMERGENCY DEPARTMENTCOURSE:         Vitals:    Vitals:    08/31/22 1804 08/31/22 2006 08/31/22 2007 08/31/22 2215   BP: 123/80 (!) 134/93  122/77   Pulse: 79  57 53   Resp: 14  12 19   Temp: 98.5 °F (36.9 °C)      SpO2: 99% 99% 99% 98%   Weight: 225 lb (102.1 kg)      Height: 5' 4\" (1.626 m)          The patient was given the following medications while in the emergency department:  Orders Placed This Encounter   Medications    meclizine (ANTIVERT) tablet 25 mg    meclizine (ANTIVERT) 12.5 MG tablet     Sig: Take 1 tablet by mouth 3 times daily as needed for Dizziness     Dispense:  15 tablet     Refill:  0     CONSULTS:  None    FINAL IMPRESSION      1.  Vertigo          DISPOSITION/PLAN   DISPOSITION Decision To Discharge 09/01/2022 12:43:58 AM      PATIENT REFERRED TO:  Shan Smith MD  [address] [place] [address]  [phone]    In 1 day      DISCHARGE MEDICATIONS:  Discharge Medication List as of 9/1/2022 12:44 AM        START taking these medications    Details   meclizine (ANTIVERT) 12.5 MG tablet Take 1 tablet by mouth 3 times daily as needed for Dizziness, Disp-15 tablet, R-0Normal           The care is provided during an unprecedented national emergency due to the novel coronavirus, COVID [address] [facility], [address] [place] [address], DO  09/01/22 7917

## 2022-09-02 LAB
EKG ATRIAL RATE: 55 BPM
EKG P AXIS: 21 DEGREES
EKG P-R INTERVAL: 146 MS
EKG Q-T INTERVAL: 438 MS
EKG QRS DURATION: 90 MS
EKG QTC CALCULATION (BAZETT): 419 MS
EKG R AXIS: 30 DEGREES
EKG T AXIS: 31 DEGREES
EKG VENTRICULAR RATE: 55 BPM

## 2022-09-02 PROCEDURE — 93010 ELECTROCARDIOGRAM REPORT: CPT | Performed by: INTERNAL MEDICINE

## 2022-09-07 ENCOUNTER — OFFICE VISIT (OUTPATIENT)
Dept: FAMILY MEDICINE CLINIC | Age: 27
End: 2022-09-07
Payer: COMMERCIAL

## 2022-09-07 VITALS
WEIGHT: 222.6 LBS | TEMPERATURE: 97.3 F | BODY MASS INDEX: 38 KG/M2 | HEART RATE: 85 BPM | DIASTOLIC BLOOD PRESSURE: 84 MMHG | SYSTOLIC BLOOD PRESSURE: 136 MMHG | OXYGEN SATURATION: 98 % | HEIGHT: 64 IN

## 2022-09-07 DIAGNOSIS — R07.89 ATYPICAL CHEST PAIN: ICD-10-CM

## 2022-09-07 DIAGNOSIS — R42 POSTURAL DIZZINESS WITH PRESYNCOPE: ICD-10-CM

## 2022-09-07 DIAGNOSIS — L60.0 INGROWN TOENAIL OF BOTH FEET: ICD-10-CM

## 2022-09-07 DIAGNOSIS — Z51.81 MEDICATION MONITORING ENCOUNTER: ICD-10-CM

## 2022-09-07 DIAGNOSIS — R55 POSTURAL DIZZINESS WITH PRESYNCOPE: ICD-10-CM

## 2022-09-07 DIAGNOSIS — F90.2 ATTENTION DEFICIT HYPERACTIVITY DISORDER (ADHD), COMBINED TYPE: Primary | ICD-10-CM

## 2022-09-07 DIAGNOSIS — Z31.61 COUNSELING ABOUT NATURAL FAMILY PLANNING: ICD-10-CM

## 2022-09-07 DIAGNOSIS — L03.031 PARONYCHIA OF GREAT TOE OF RIGHT FOOT: ICD-10-CM

## 2022-09-07 LAB
ALCOHOL URINE: NORMAL
AMPHETAMINE SCREEN, URINE: POSITIVE
BARBITURATE SCREEN, URINE: NEGATIVE
BENZODIAZEPINE SCREEN, URINE: NEGATIVE
BUPRENORPHINE URINE: NORMAL
COCAINE METABOLITE SCREEN URINE: NEGATIVE
FENTANYL SCREEN, URINE: NORMAL
GABAPENTIN SCREEN, URINE: NORMAL
MDMA URINE: NEGATIVE
METHADONE SCREEN, URINE: NEGATIVE
METHAMPHETAMINE, URINE: NEGATIVE
OPIATE SCREEN URINE: NEGATIVE
OXYCODONE SCREEN URINE: NEGATIVE
PHENCYCLIDINE SCREEN URINE: NEGATIVE
PROPOXYPHENE SCREEN, URINE: NORMAL
SYNTHETIC CANNABINOIDS(K2) SCREEN, URINE: NORMAL
THC SCREEN, URINE: NEGATIVE
TRAMADOL SCREEN URINE: NORMAL
TRICYCLIC ANTIDEPRESSANTS, UR: NEGATIVE

## 2022-09-07 PROCEDURE — 99214 OFFICE O/P EST MOD 30 MIN: CPT | Performed by: FAMILY MEDICINE

## 2022-09-07 PROCEDURE — 80305 DRUG TEST PRSMV DIR OPT OBS: CPT | Performed by: FAMILY MEDICINE

## 2022-09-07 RX ORDER — DEXTROAMPHETAMINE SACCHARATE, AMPHETAMINE ASPARTATE MONOHYDRATE, DEXTROAMPHETAMINE SULFATE AND AMPHETAMINE SULFATE 2.5; 2.5; 2.5; 2.5 MG/1; MG/1; MG/1; MG/1
10 CAPSULE, EXTENDED RELEASE ORAL DAILY
Qty: 30 CAPSULE | Refills: 0 | Status: SHIPPED | OUTPATIENT
Start: 2022-09-07 | End: 2022-10-07

## 2022-09-07 RX ORDER — CEPHALEXIN 500 MG/1
500 CAPSULE ORAL 3 TIMES DAILY
Qty: 21 CAPSULE | Refills: 0 | Status: SHIPPED | OUTPATIENT
Start: 2022-09-07 | End: 2022-09-14

## 2022-09-07 SDOH — ECONOMIC STABILITY: FOOD INSECURITY: WITHIN THE PAST 12 MONTHS, YOU WORRIED THAT YOUR FOOD WOULD RUN OUT BEFORE YOU GOT MONEY TO BUY MORE.: NEVER TRUE

## 2022-09-07 SDOH — ECONOMIC STABILITY: FOOD INSECURITY: WITHIN THE PAST 12 MONTHS, THE FOOD YOU BOUGHT JUST DIDN'T LAST AND YOU DIDN'T HAVE MONEY TO GET MORE.: NEVER TRUE

## 2022-09-07 ASSESSMENT — ENCOUNTER SYMPTOMS
SHORTNESS OF BREATH: 0
ABDOMINAL PAIN: 0
ABDOMINAL DISTENTION: 0
CHEST TIGHTNESS: 0
CONSTIPATION: 0
TROUBLE SWALLOWING: 0
COUGH: 0
WHEEZING: 0
VOMITING: 0
NAUSEA: 0
DIARRHEA: 0

## 2022-09-07 ASSESSMENT — PATIENT HEALTH QUESTIONNAIRE - PHQ9
SUM OF ALL RESPONSES TO PHQ QUESTIONS 1-9: 0
SUM OF ALL RESPONSES TO PHQ9 QUESTIONS 1 & 2: 0
SUM OF ALL RESPONSES TO PHQ QUESTIONS 1-9: 0
SUM OF ALL RESPONSES TO PHQ QUESTIONS 1-9: 0
2. FEELING DOWN, DEPRESSED OR HOPELESS: 0
SUM OF ALL RESPONSES TO PHQ QUESTIONS 1-9: 0
1. LITTLE INTEREST OR PLEASURE IN DOING THINGS: 0

## 2022-09-07 ASSESSMENT — SOCIAL DETERMINANTS OF HEALTH (SDOH): HOW HARD IS IT FOR YOU TO PAY FOR THE VERY BASICS LIKE FOOD, HOUSING, MEDICAL CARE, AND HEATING?: NOT HARD AT ALL

## 2022-09-07 NOTE — PROGRESS NOTES
Visit Information    Have you changed or started any medications since your last visit including any over-the-counter medicines, vitamins, or herbal medicines? yes - added medications from dermatologist   Have you stopped taking any of your medications? Is so, why? -  no  Are you having any side effects from any of your medications? - no    Have you seen any other physician or provider since your last visit? yes -    Have you had any other diagnostic tests since your last visit? yes - EKG   Have you been seen in the emergency room and/or had an admission in a hospital since we last saw you?  yes -    Have you had your routine dental cleaning in the past 6 months?  no     Do you have an active MyChart account? If no, what is the barrier?   Yes    Patient Care Team:  Hetal Richards MD as PCP - General (Family Medicine)  Hetal Richards MD as PCP - [facility]  Sierra Villarreal MD as Obstetrician (Perinatology)  Blue Martínez DO as Consulting Physician (Obstetrics & Gynecology)    Medical History Review  Past Medical, Family, and Social History reviewed and does contribute to the patient presenting condition    Health Maintenance   Topic Date Due    COVID-19 Vaccine (1) Never done    DTaP/Tdap/Td vaccine (1 - Tdap) Never done    Pap smear  Never done    Flu vaccine (1) Never done    Depression Screen  03/22/2023    A1C test (Diabetic or Prediabetic)  06/13/2023    Hepatitis C screen  Completed    HIV screen  Completed    Hepatitis A vaccine  Aged Out    Hepatitis B vaccine  Aged Out    Hib vaccine  Aged Out    Meningococcal (ACWY) vaccine  Aged Out    Pneumococcal 0-64 years Vaccine  Aged Out    Varicella vaccine  Discontinued

## 2022-09-07 NOTE — PROGRESS NOTES
Derrick Valero (:  1995) is a 28 y.o. female,Established patient, here for evaluation of the following chief complaint(s): ADHD and Loss of Consciousness (Pt went to ED on 22 at [facility] and patient blood pressure at home 152/104. This is not the first thing that has happened. )      ASSESSMENT/PLAN:    1. Attention deficit hyperactivity disorder (ADHD), combined type  Improved with medications  Counseling given not to become pregnant while taking it, The patient verbalizes understanding and agrees with the plan. Her  will use condoms at this time  -     amphetamine-dextroamphetamine (ADDERALL XR) 10 MG extended release capsule; Take 1 capsule by mouth daily for 30 days. , Disp-30 capsule, R-0Normal  2. Postural dizziness with presyncope  Increase fluids, avoid changing position fast, possible POTS  Avoid wired bra  -     AFL - Merlin Stare, MD, Cardiology, [place]  -     XR CHEST (2 VW); Future  3. Atypical chest pain  Failing to resolve  Possible acid reflux  Advised to avoid wired bra and to try sports bra instead, today she does not have reproducible chest pain  -     AFL - Merlin Stare, MD, Cardiology, [place]  -     XR CHEST (2 VW); Future-to rule out cardiac or pulmonary condition  4. Ingrown toenail of both feet  Failing to resolve  -     SANTA - Khadra Castillo DPM, Podiatry, [employer]  5. Paronychia of great toe of right foot  Worsening  -     mupirocin (BACTROBAN) 2 % ointment; Apply topically 3 times daily x 10 days. , Disp-30 g, R-3, Normal  -     cephALEXin (KEFLEX) 500 MG capsule; Take 1 capsule by mouth 3 times daily for 7 days, Disp-21 capsule, R-0Normal  6.  Medication monitoring encounter  -     POCT Rapid Drug Screen-urine drug screen consistent with treatment    Results for POC orders placed in visit on 22   POCT Rapid Drug Screen   Result Value Ref Range    Alcohol, Urine      Amphetamine Screen, Urine POSITIVE     Barbiturate Screen, Urine NEGATIVE Benzodiazepine Screen, Urine NEGATIVE     Buprenorphine Urine      Cocaine Metabolite Screen, Urine NEGATIVE     FENTANYL SCREEN, URINE      Gabapentin Screen, Urine      MDMA, Urine NEGATIVE     Methadone Screen, Urine NEGATIVE     Methamphetamine, Urine NEGATIVE     Opiate Scrn, Ur NEGATIVE     Oxycodone Screen, Ur NEGATIVE     PCP Screen, Urine NEGATIVE     Propoxyphene Screen, Urine      Synthetic Cannabinoids (K2) Screen, Urine      THC Screen, Urine NEGATIVE     Tramadol Scrn, Ur      Tricyclic Antidepressants, Urine NEGATIVE        7. Counseling about natural family planning  Patient is considering to get pregnant in the near future, but did not decide yet when. She will continue to use condoms  Patient strongly advised to establish with GYN for further work-up for recurrent miscarriages, she has history of 5 miscarriages one of them in the second trimester around 13 weeks  I gave her contact information and locations for different GYN in her area  Strongly advised not to become pregnant when taking Adderall, risks discussed, The patient verbalizes understanding and agrees with the plan. She is currently driving a truck, she needed to focus, we discussed behavioral therapist but she is not interested at this time. Discussed absolutely to abstain from any alcoholic beverages, drug screen today positive for adderall , she says she had a  small drink last night with her   She reports compliance with Adderall , but she understands she needs to stop 1 month before she wants to become pregnant    Discussed to contact  GYN and to consider further work-up for recurrent miscarriages, I did not find any in the chart  Patient is A+ with antibody screen positive called nonspecific , I explained to her that I am not sure what is the significance of this test at this time. Again strongly advised further work-up with GYN for recurrent miscarriages    The patient verbalizes understanding and agrees with the plan. Controlled Substance Monitoring:    Acute and Chronic Pain Monitoring:   RX Monitoring 9/7/2022   Attestation -   Periodic Controlled Substance Monitoring Possible medication side effects, risk of tolerance/dependence & alternative treatments discussed. ;No signs of potential drug abuse or diversion identified. ;Assessed functional status. ;Random urine drug screen sent today. Rosy Gutierrez received counseling on the following healthy behaviors: nutrition, exercise, medication adherence, and weight loss  Reviewed prior labs and health maintenance  Discussed use, benefit, and side effects of prescribed medications. Barriers to medication compliance addressed. Patient given educational materials - see patient instructions  All patient questions answered. Patient voiced understanding. The patient's past medical,surgical, social, and family history as well as her current medications and allergies were reviewed as documented in today's encounter. Medications, labs, diagnostic studies, consultations and follow-up as documented in this encounter. Return in about 3 months (around 12/7/2022) for ADHD. Data Unavailable    Future Appointments   Date Time Provider Norbert Diego   10/10/2022  2:15 PM [facility]   11/1/2022  2:45 PM [facility]   12/8/2022 11:30 AM [facility]         SUBJECTIVE/OBJECTIVE:    HPI      ADD/ADHD:  Current treatment: Adderall XR-10 mg, which has been effective. Patient does report she is taking it only when working, 3 to 4 days a week, last taken this morning. She works for [employer] as a   Residual symptoms: anxiety. Medication side effects: None. Patient denies anorexia, nausea, vomiting, abdominal pain, involuntary weight loss, insomnia, irritability, headache, and tremor.     Contraception using: condoms on and off  Has history of PCOS,   Has been Off OCP for 3 years, her menstrual periods are still not regular. Advised condom consistently  Patient reports she had miscarriages 5, but no work-up in the computer found      Patient was on metformin  up to 1/12/2022   ultrasound pelvis didn't show PCOS  She is currently not taking metformin anymore      Last urine drug screen: Done today and consistent  Is to absolutely not to drink any alcoholic beverages  Not using marijuana    Patient was advised not to become pregnant while taking this medication and to avoid taking it over the weekends. BP Readings from Last 3 Encounters:   09/07/22 136/84   08/31/22 122/77   08/15/22 113/79     Urine drug screen consistent with treatment, positive for amphetamines  Results for POC orders placed in visit on 09/07/22   POCT Rapid Drug Screen   Result Value Ref Range    Alcohol, Urine      Amphetamine Screen, Urine POSITIVE     Barbiturate Screen, Urine NEGATIVE     Benzodiazepine Screen, Urine NEGATIVE     Buprenorphine Urine      Cocaine Metabolite Screen, Urine NEGATIVE     FENTANYL SCREEN, URINE      Gabapentin Screen, Urine      MDMA, Urine NEGATIVE     Methadone Screen, Urine NEGATIVE     Methamphetamine, Urine NEGATIVE     Opiate Scrn, Ur NEGATIVE     Oxycodone Screen, Ur NEGATIVE     PCP Screen, Urine NEGATIVE     Propoxyphene Screen, Urine      Synthetic Cannabinoids (K2) Screen, Urine      THC Screen, Urine NEGATIVE     Tramadol Scrn, Ur      Tricyclic Antidepressants, Urine NEGATIVE      Patient reports vertigo and dizziness  Patient reports randomly pasing out, getting dizzy , pain in the chest, feels lightheaded when changing position, and her eyes  are crossing when it happens. Patient reports she gets left sided pains, lasting seconds radiating to the  in the neck.   She does have Wire bra, advised to remove the wire  Has been more tired for the past 3 days after the ED visit  Patient says BP was high at home, but then in ED BP was better  She is still getting random chest pains   Patient was Social History     Tobacco Use    Smoking status: Former     Packs/day: 0.25     Types: Cigarettes    Smokeless tobacco: Never    Tobacco comments:     Patient current vapes   Vaping Use    Vaping Use: Every day   Substance Use Topics    Alcohol use: Yes     Comment: Rarely. Drug use: No         Review of Systems   Constitutional:  Positive for fatigue. Negative for activity change, appetite change, chills, diaphoresis, fever and unexpected weight change. HENT:  Negative for trouble swallowing. Respiratory:  Negative for cough, chest tightness, shortness of breath and wheezing. Cardiovascular:  Positive for chest pain. Negative for palpitations and leg swelling. Gastrointestinal:  Negative for abdominal distention, abdominal pain, constipation, diarrhea, nausea and vomiting. Genitourinary:  Positive for menstrual problem (irregular). Musculoskeletal:  Positive for arthralgias (feet). Skin:  Positive for rash (Right great toe). Neurological:  Positive for dizziness and light-headedness. Negative for tremors and speech difficulty. Hematological:  Does not bruise/bleed easily. Psychiatric/Behavioral:  Positive for decreased concentration. Negative for dysphoric mood, self-injury, sleep disturbance and suicidal ideas. The patient is nervous/anxious.          -vital signs stable and within normal limits except severe obesity per BMI  /84   Pulse 85   Temp 97.3 °F (36.3 °C) (Temporal)   Ht 5' 4\" (1.626 m)   Wt 222 lb 9.6 oz (101 kg)   LMP 08/18/2022 (Exact Date)   SpO2 98%   BMI 38.21 kg/m²      Physical Exam  Vitals and nursing note reviewed. Constitutional:       General: She is not in acute distress. Appearance: Normal appearance. She is well-developed. She is obese. She is not diaphoretic. HENT:      Head: Normocephalic and atraumatic.       Right Ear: External ear normal.      Left Ear: External ear normal.      Mouth/Throat:      Comments: I did not examine the mouth due to coronavirus pandemic and wearing masks    Eyes:      General: Lids are normal. No scleral icterus. Right eye: No discharge. Left eye: No discharge. Extraocular Movements: Extraocular movements intact. Conjunctiva/sclera: Conjunctivae normal.   Neck:      Thyroid: No thyromegaly. Cardiovascular:      Rate and Rhythm: Normal rate and regular rhythm. Heart sounds: Normal heart sounds. No murmur heard. Pulmonary:      Effort: Pulmonary effort is normal. No respiratory distress. Breath sounds: Normal breath sounds. No wheezing or rales. Chest:      Chest wall: No tenderness. Comments: There is no reproducible chest pain, retrosternal, upper chest, or lower ribs, she does wear a wired bra, advised to remove the wire  Abdominal:      General: Bowel sounds are normal. There is no distension. Palpations: Abdomen is soft. There is no hepatomegaly or splenomegaly. Tenderness: There is no abdominal tenderness. Comments: Obese abdomen   Musculoskeletal:         General: No tenderness. Normal range of motion. Cervical back: Normal range of motion and neck supple. Right lower leg: No edema. Left lower leg: No edema. Feet:      Right foot:      Skin integrity: Callus present. Left foot:      Skin integrity: Callus present. Comments: Ingrown great toenails bilaterally. There is erythema, redness, swelling and pain on the lateral side of the right great toe consistent with paronychia. Calluses Plantar, bilateral   Skin:     General: Skin is warm and dry. Capillary Refill: Capillary refill takes less than 2 seconds. Findings: Rash present. Neurological:      Mental Status: She is alert and oriented to person, place, and time. Cranial Nerves: No cranial nerve deficit. Motor: No abnormal muscle tone. Psychiatric:         Mood and Affect: Mood is anxious. Speech: Speech is rapid and pressured. Behavior: Behavior normal.         Thought Content: Thought content normal.         Judgment: Judgment normal.           I personally reviewed testing . Discussed testing with the patient and all questions fully answered.   Increased creatinine level, advised to increase fluids  Abnormal antibodies  Abnormal urine but no UTI, advised to increase fluids  Otherwise labs within normal limits    Admission on 08/31/2022, Discharged on 09/01/2022   Component Date Value Ref Range Status    POC Glucose 08/31/2022 96  65 - 105 mg/dL Final    WBC 08/31/2022 9.3  3.5 - 11.0 k/uL Final    RBC 08/31/2022 4.94  4.0 - 5.2 m/uL Final    Hemoglobin 08/31/2022 12.9  12.0 - 16.0 g/dL Final    Hematocrit 08/31/2022 40.0  36 - 46 % Final    MCV 08/31/2022 81.0  80 - 100 fL Final    MCH 08/31/2022 26.1  26 - 34 pg Final    MCHC 08/31/2022 32.2  31 - 37 g/dL Final    RDW 08/31/2022 13.8  11.5 - 14.9 % Final    Platelets 00/49/9417 265  150 - 450 k/uL Final    MPV 08/31/2022 9.4  6.0 - 12.0 fL Final    Seg Neutrophils 08/31/2022 58  36 - 66 % Final    Lymphocytes 08/31/2022 35  24 - 44 % Final    Monocytes 08/31/2022 6  1 - 7 % Final    Eosinophils % 08/31/2022 1  0 - 4 % Final    Basophils 08/31/2022 0  0 - 2 % Final    Segs Absolute 08/31/2022 5.50  1.3 - 9.1 k/uL Final    Absolute Lymph # 08/31/2022 3.20  1.0 - 4.8 k/uL Final    Absolute Mono # 08/31/2022 0.60  0.1 - 1.3 k/uL Final    Absolute Eos # 08/31/2022 0.10  0.0 - 0.4 k/uL Final    Basophils Absolute 08/31/2022 0.00  0.0 - 0.2 k/uL Final    Glucose 08/31/2022 93  70 - 99 mg/dL Final    BUN 08/31/2022 8  6 - 20 mg/dL Final    Creatinine 08/31/2022 1.06 (A) 0.50 - 0.90 mg/dL Final    Calcium 08/31/2022 9.6  8.6 - 10.4 mg/dL Final    Sodium 08/31/2022 139  135 - 144 mmol/L Final    Potassium 08/31/2022 4.1  3.7 - 5.3 mmol/L Final    Chloride 08/31/2022 104  98 - 107 mmol/L Final    CO2 08/31/2022 26  20 - 31 mmol/L Final    Anion Gap 08/31/2022 9  9 - 17 mmol/L Final    GFR Non- 08/31/2022 >60  >60 mL/min Final    GFR  08/31/2022 >60  >60 mL/min Final    GFR Comment 08/31/2022        Final    Comment: Average GFR for 2129 years old:   116 mL/min/1.73sq m  Chronic Kidney Disease:   <60 mL/min/1.73sq m  Kidney failure:   <15 mL/min/1.73sq m              eGFR calculated using average adult body mass. Additional eGFR calculator available at:        [email or web address]            Color, UA 08/31/2022 Dark Yellow (A) Yellow Final    Turbidity UA 08/31/2022 Cloudy (A) Clear Final    Glucose, Ur 08/31/2022 NEGATIVE  NEGATIVE Final    Bilirubin Urine 08/31/2022 NEGATIVE  NEGATIVE Final    Ketones, Urine 08/31/2022 SMALL (A) NEGATIVE Final    Specific Gravity, UA 08/31/2022 1.028  1.000 - 1.030 Final    Urine Hgb 08/31/2022 NEGATIVE  NEGATIVE Final    pH, UA 08/31/2022 5.5  5.0 - 8.0 Final    Protein, UA 08/31/2022 NEGATIVE  NEGATIVE Final    Urobilinogen, Urine 08/31/2022 Normal  Normal Final    Nitrite, Urine 08/31/2022 NEGATIVE  NEGATIVE Final    Leukocyte Esterase, Urine 08/31/2022 NEGATIVE  NEGATIVE Final    HCG(Urine) Pregnancy Test 08/31/2022 NEGATIVE  NEGATIVE Final    Comment: Specimens with hCG levels near the threshold of the test (25 mIU/mL) may give a negative or   indeterminate result. In such cases, another test should be performed with a new specimen   in 48-72 hours. If early pregnancy is suspected clinically in this setting, correlation   with quantitative serum b-hCG level is suggested.       Expiration Date 08/31/2022 09/03/2022,2359   Final    Arm Band Number 08/31/2022 [id number]   Final    ABO/Rh 08/31/2022 A POSITIVE   Final    Antibody Screen 08/31/2022 POSITIVE   Final    Antibody ID 08/31/2022 COLD NON-SPECIFIC AUTOAGGLUTININ   Final    Prewarm Antibody Screen 08/31/2022 NEGATIVE   Final    Blood Bank Comment 08/31/2022    Final                    Value:PT'S ABORH CONFIRMED A POS:403  Results Verified      WBC, UA 08/31/2022 3 to 5  /HPF Final    RBC, UA 08/31/2022 0 TO 2  /HPF Final    Casts UA 08/31/2022 HYALINE  /LPF Final    Casts UA 08/31/2022 0 TO 2  /LPF Final    Epithelial Cells UA 08/31/2022 21 TO 50  /HPF Final    Bacteria, UA 08/31/2022 MODERATE (A) None Final    Mucus, UA 08/31/2022 3+ (A) None Final    Ventricular Rate 08/31/2022 55  BPM Final    Atrial Rate 08/31/2022 55  BPM Final    P-R Interval 08/31/2022 146  ms Final    QRS Duration 08/31/2022 90  ms Final    Q-T Interval 08/31/2022 438  ms Final    QTc Calculation (Bazett) 08/31/2022 419  ms Final    P Axis 08/31/2022 21  degrees Final    R Axis 08/31/2022 30  degrees Final    T Axis 08/31/2022 31  degrees Final            Lab Results   Component Value Date    ALT 10 06/13/2022    AST 13 06/13/2022    ALKPHOS 65 06/13/2022    BILITOT 0.40 06/13/2022       Lab Results   Component Value Date    TSH 0.93 06/13/2022     Orders Placed This Encounter   Procedures    XR CHEST (2 VW)     Standing Status:   Future     Number of Occurrences:   1     Standing Expiration Date:   9/7/2023     Order Specific Question:   Reason for exam:     Answer:   Cough    SANTA Balderrama MD, Cardiology, [place]     Referral Priority:   Routine     Referral Type:   Eval and Treat     Referral Reason:   Specialty Services Required     Referred to Provider:   Angela Kohli MD     Requested Specialty:   Cardiology     Number of Visits Requested:   1    Vivienne Bennetty, [place]     Referral Priority:   Routine     Referral Type:   Eval and Treat     Referral Reason:   Specialty Services Required     Referred to Provider:   Marilou Christianson DPM     Requested Specialty:   Podiatry     Number of Visits Requested:   1    POCT Rapid Drug Screen       Orders Placed This Encounter   Medications    amphetamine-dextroamphetamine (ADDERALL XR) 10 MG extended release capsule     Sig: Take 1 capsule by mouth daily for 30 days.      Dispense:  30 capsule Refill:  0    mupirocin (BACTROBAN) 2 % ointment     Sig: Apply topically 3 times daily x 10 days. Dispense:  30 g     Refill:  3    cephALEXin (KEFLEX) 500 MG capsule     Sig: Take 1 capsule by mouth 3 times daily for 7 days     Dispense:  21 capsule     Refill:  0       Medications Discontinued During This Encounter   Medication Reason    diphenhydrAMINE (BENADRYL) capsule 50 mg     amphetamine-dextroamphetamine (ADDERALL XR) 10 MG extended release capsule REORDER             On this date 9/7/2022 I have spent 35 minutes reviewing previous notes, test results and face to face with the patient discussing the diagnosis and importance of compliance with the treatment plan as well as documenting on the day of the visit. This note was completed by using the assistance of a speech-recognition program. However, inadvertent computerized transcription errors may be present. Although every effort was made to ensure accuracy, no guarantees can be provided that every mistake has been identified and corrected by editing. An electronic signature was used to authenticate this note.   Electronically signed by Governor MD Jordin on 9/12/2022  7:16 PM

## 2022-09-08 ENCOUNTER — HOSPITAL ENCOUNTER (OUTPATIENT)
Age: 27
Discharge: HOME | End: 2022-09-10
Payer: COMMERCIAL

## 2022-09-08 ENCOUNTER — HOSPITAL ENCOUNTER (OUTPATIENT)
Dept: GENERAL RADIOLOGY | Age: 27
Discharge: HOME | End: 2022-09-10
Payer: COMMERCIAL

## 2022-09-08 DIAGNOSIS — R55 POSTURAL DIZZINESS WITH PRESYNCOPE: ICD-10-CM

## 2022-09-08 DIAGNOSIS — R42 POSTURAL DIZZINESS WITH PRESYNCOPE: ICD-10-CM

## 2022-09-08 DIAGNOSIS — R07.89 ATYPICAL CHEST PAIN: ICD-10-CM

## 2022-09-08 PROCEDURE — 71046 X-RAY EXAM CHEST 2 VIEWS: CPT

## 2022-09-08 NOTE — RESULT ENCOUNTER NOTE
Addressed during office visit today, urine drug screen normal today, positive for, amphetamine, consistent with treatment, continue treatment recommended during the office visit.

## 2022-09-10 NOTE — RESULT ENCOUNTER NOTE
Please notify patient: normal chest X-ray     Irina ise Appointments  9/12/2022  10:30 AM   Molli Skiff, PA-C          [facility]  10/10/2022 2:15 PM    Amalia Jaarmillo MD          [facility]  12/8/2022  11:30 AM   Kike Arrieta MD     [facility]

## 2022-09-12 DIAGNOSIS — K21.9 GASTROESOPHAGEAL REFLUX DISEASE WITHOUT ESOPHAGITIS: Primary | ICD-10-CM

## 2022-09-12 RX ORDER — FAMOTIDINE 40 MG/1
20 TABLET, FILM COATED ORAL 2 TIMES DAILY
Qty: 30 TABLET | Refills: 3 | Status: SHIPPED | OUTPATIENT
Start: 2022-09-12

## 2022-09-12 NOTE — RESULT ENCOUNTER NOTE
Please notify patient prescription for Pepcid sent to the pharmacy  Keep appointment with cardiologist as scheduled, I believe that it is okay  Keep appointment with PCP as well  If worsening chest pain, comes and goes, associated shortness of breath, lightheadedness, sweating, to go to ER    Future Appointments  10/10/2022 2:15 PM    Milli Woods MD          [facility]  11/1/2022  2:45 PM    Kelly Mcbride PA-C          [facility]  12/8/2022  11:30 AM   Walker Stover MD     [facility]

## 2022-09-12 NOTE — RESULT ENCOUNTER NOTE
Please notify patient  I refer her to cardiologist  I will suggest to start some Pepcid to see if that will help with possible heartburn and acid reflux, please let me know if she agrees for Pepcid and I will sent to the pharmacy, what pharmacy?     BP Readings from Last 3 Encounters:  09/07/22 : 136/84  08/31/22 : 122/77  08/15/22 : 113/79        Future Appointments  10/10/2022 2:15 PM    Catarino Lopez MD          [facility]  11/1/2022  2:45 PM    Lani Bonilla PA-C          [facility]  12/8/2022  11:30 AM   Rosanna Hays MD     [facility]

## 2022-09-14 NOTE — RESULT ENCOUNTER NOTE
Noted  Future Appointments  10/10/2022 2:15 PM    [facility]  11/1/2022  2:45 PM    Blair Chen PA-C          [facility]  12/8/2022  11:30 AM   D[facility] Valladares [facility]     [facility]

## 2022-11-11 ENCOUNTER — TELEPHONE (OUTPATIENT)
Dept: FAMILY MEDICINE CLINIC | Age: 27
End: 2022-11-11

## 2022-11-11 NOTE — TELEPHONE ENCOUNTER
Incoming call came from [facility]. Patient is complaining of onset symptoms of can't breathe due to stuffy nose. Been ongoing for the past few days. I did Triage phone call and gathered enough information. Due to the serious onset symptoms/conditions. Patient was advised to seek medical attention with nearest Emergency room department/walk in clinic/urgent care. To be seen and evaluated. Also once discharged. Patient was advised to give our office a call back to schedule a follow up appointment with provider. Patient verbalized : Yes will go to urgent care .      Future Appointments   Date Time Provider Norbert Diego   12/8/2022 11:30 AM [facility]inski [facility]

## 2022-11-11 NOTE — TELEPHONE ENCOUNTER
Noted. Thank you!   I agree with urgent care  Future Appointments   Date Time Provider Norbert Diego   12/8/2022 11:30 AM [facility]

## 2023-08-30 ENCOUNTER — OFFICE VISIT (OUTPATIENT)
Dept: FAMILY MEDICINE CLINIC | Age: 28
End: 2023-08-30
Payer: COMMERCIAL

## 2023-08-30 VITALS
HEART RATE: 84 BPM | TEMPERATURE: 97.6 F | OXYGEN SATURATION: 96 % | HEIGHT: 64 IN | SYSTOLIC BLOOD PRESSURE: 110 MMHG | DIASTOLIC BLOOD PRESSURE: 86 MMHG | WEIGHT: 262.8 LBS | BODY MASS INDEX: 44.86 KG/M2

## 2023-08-30 DIAGNOSIS — R51.9 HEADACHE DISORDER: ICD-10-CM

## 2023-08-30 DIAGNOSIS — R53.82 CHRONIC FATIGUE: ICD-10-CM

## 2023-08-30 DIAGNOSIS — R42 POSTURAL DIZZINESS WITH PRESYNCOPE: ICD-10-CM

## 2023-08-30 DIAGNOSIS — R73.9 HYPERGLYCEMIA: ICD-10-CM

## 2023-08-30 DIAGNOSIS — R55 POSTURAL DIZZINESS WITH PRESYNCOPE: ICD-10-CM

## 2023-08-30 DIAGNOSIS — R55 SYNCOPE, UNSPECIFIED SYNCOPE TYPE: Primary | ICD-10-CM

## 2023-08-30 PROBLEM — Z97.5 IUD (INTRAUTERINE DEVICE) IN PLACE: Status: ACTIVE | Noted: 2023-07-03

## 2023-08-30 PROBLEM — Z30.44 ENCOUNTER FOR SURVEILLANCE OF VAGINAL RING HORMONAL CONTRACEPTIVE DEVICE: Status: RESOLVED | Noted: 2017-09-18 | Resolved: 2023-08-30

## 2023-08-30 PROBLEM — E28.2 POLYCYSTIC OVARIAN SYNDROME: Status: ACTIVE | Noted: 2022-10-10

## 2023-08-30 PROBLEM — G43.909 MIGRAINE: Status: ACTIVE | Noted: 2022-10-10

## 2023-08-30 LAB — HBA1C MFR BLD: 4.9 %

## 2023-08-30 PROCEDURE — 83036 HEMOGLOBIN GLYCOSYLATED A1C: CPT | Performed by: FAMILY MEDICINE

## 2023-08-30 PROCEDURE — 99214 OFFICE O/P EST MOD 30 MIN: CPT | Performed by: FAMILY MEDICINE

## 2023-08-30 RX ORDER — CEPHALEXIN 500 MG/1
500 CAPSULE ORAL 2 TIMES DAILY
Qty: 20 CAPSULE | Refills: 0 | COMMUNITY
Start: 2023-08-24 | End: 2023-09-03

## 2023-08-30 RX ORDER — PNV NO.95/FERROUS FUM/FOLIC AC 28MG-0.8MG
1 TABLET ORAL DAILY
Qty: 90 TABLET | Refills: 3 | Status: SHIPPED | OUTPATIENT
Start: 2023-08-30

## 2023-08-30 RX ORDER — METOCLOPRAMIDE 10 MG/1
TABLET ORAL
COMMUNITY
Start: 2023-08-01

## 2023-08-30 SDOH — ECONOMIC STABILITY: FOOD INSECURITY: WITHIN THE PAST 12 MONTHS, YOU WORRIED THAT YOUR FOOD WOULD RUN OUT BEFORE YOU GOT MONEY TO BUY MORE.: NEVER TRUE

## 2023-08-30 SDOH — ECONOMIC STABILITY: INCOME INSECURITY: HOW HARD IS IT FOR YOU TO PAY FOR THE VERY BASICS LIKE FOOD, HOUSING, MEDICAL CARE, AND HEATING?: HARD

## 2023-08-30 SDOH — ECONOMIC STABILITY: FOOD INSECURITY: WITHIN THE PAST 12 MONTHS, THE FOOD YOU BOUGHT JUST DIDN'T LAST AND YOU DIDN'T HAVE MONEY TO GET MORE.: NEVER TRUE

## 2023-08-30 SDOH — ECONOMIC STABILITY: HOUSING INSECURITY
IN THE LAST 12 MONTHS, WAS THERE A TIME WHEN YOU DID NOT HAVE A STEADY PLACE TO SLEEP OR SLEPT IN A SHELTER (INCLUDING NOW)?: NO

## 2023-08-30 ASSESSMENT — ENCOUNTER SYMPTOMS
DIARRHEA: 0
COUGH: 0
CONSTIPATION: 0
ABDOMINAL PAIN: 0
VOMITING: 0
SHORTNESS OF BREATH: 0
ABDOMINAL DISTENTION: 0
NAUSEA: 0
WHEEZING: 0
CHEST TIGHTNESS: 0

## 2023-08-30 ASSESSMENT — PATIENT HEALTH QUESTIONNAIRE - PHQ9
SUM OF ALL RESPONSES TO PHQ QUESTIONS 1-9: 0
1. LITTLE INTEREST OR PLEASURE IN DOING THINGS: 0
SUM OF ALL RESPONSES TO PHQ QUESTIONS 1-9: 0
SUM OF ALL RESPONSES TO PHQ QUESTIONS 1-9: 0
2. FEELING DOWN, DEPRESSED OR HOPELESS: 0
SUM OF ALL RESPONSES TO PHQ9 QUESTIONS 1 & 2: 0
SUM OF ALL RESPONSES TO PHQ QUESTIONS 1-9: 0

## 2023-08-30 NOTE — PROGRESS NOTES
Visit Information    Have you changed or started any medications since your last visit including any over-the-counter medicines, vitamins, or herbal medicines? yes -    Have you stopped taking any of your medications? Is so, why? -  yes -   Are you having any side effects from any of your medications? - no    Have you seen any other physician or provider since your last visit? yes -    Have you had any other diagnostic tests since your last visit? yes -  Have you been seen in the emergency room and/or had an admission in a hospital since we last saw you?  yes -    Have you had your routine dental cleaning in the past 6 months?  no     Do you have an active MyChart account? If no, what is the barrier?   Yes    Patient Care Team:  Vannessa Fry MD as PCP - General (Family Medicine)  Vannessa Fry MD as PCP - Empaneled Provider  Nathanial Romberg, MD as Obstetrician (Perinatology)  Ibrahima Steinberg DO as Consulting Physician (Obstetrics & Gynecology)    Medical History Review  Past Medical, Family, and Social History reviewed and does contribute to the patient presenting condition    Health Maintenance   Topic Date Due    COVID-19 Vaccine (1) Never done    Pap smear  Never done    A1C test (Diabetic or Prediabetic)  06/13/2023    Flu vaccine (1) Never done    Depression Screen  09/07/2023    DTaP/Tdap/Td vaccine (2 - Td or Tdap) 05/19/2033    Meningococcal (ACWY) vaccine  Completed    Hepatitis C screen  Completed    HIV screen  Completed    Hepatitis A vaccine  Aged Out    Hib vaccine  Aged Out    Pneumococcal 0-64 years Vaccine  Aged Out    Varicella vaccine  Discontinued
Eagle Belle MD     Requested Specialty:   Cardiology     Number of Visits Requested:   Padmini Aldana MD, Neurology, [place]     Referral Priority:   Routine     Referral Type:   Eval and Treat     Referral Reason:   Specialty Services Required     Referred to Provider:   Kellie Loving MD     Requested Specialty:   Neurology     Number of Visits Requested:   1    POCT glycosylated hemoglobin (Hb A1C)    EEG     Standing Status:   Future     Standing Expiration Date:   10/29/2023       Orders Placed This Encounter   Medications    Prenatal Vit-Fe Fumarate-FA (PRENATAL VITAMINS) 28-0.8 MG TABS     Sig: Take 1 tablet by mouth daily     Dispense:  90 tablet     Refill:  3       Medications Discontinued During This Encounter   Medication Reason    amphetamine-dextroamphetamine (ADDERALL XR) 10 MG extended release capsule Therapy completed    fluocinonide (LIDEX) 0.05 % external solution Therapy completed    ketoconazole (NIZORAL) 2 % cream Therapy completed    mupirocin (BACTROBAN) 2 % ointment Therapy completed           On this date 8/30/2023 I have spent 35 minutes reviewing previous notes, test results and face to face with the patient discussing the diagnosis and importance of compliance with the treatment plan as well as documenting on the day of the visit. This note was completed by using the assistance of a speech-recognition program. However, inadvertent computerized transcription errors may be present. Although every effort was made to ensure accuracy, no guarantees can be provided that every mistake has been identified and corrected by editing. An electronic signature was used to authenticate this note.   Electronically signed by Blanca Butler MD on 9/4/2023 at 2:06 PM

## 2023-09-04 PROBLEM — L30.4 PRURITIC INTERTRIGO: Status: RESOLVED | Noted: 2017-01-12 | Resolved: 2023-09-04

## 2023-09-04 PROBLEM — R55 SYNCOPE: Status: ACTIVE | Noted: 2023-09-04

## 2023-09-04 PROBLEM — O20.0 THREATENED MISCARRIAGE: Status: RESOLVED | Noted: 2019-11-13 | Resolved: 2023-09-04

## 2023-09-04 PROBLEM — B00.9 HERPES SIMPLEX: Status: RESOLVED | Noted: 2019-11-13 | Resolved: 2023-09-04

## 2023-09-04 PROBLEM — F90.2 ATTENTION DEFICIT HYPERACTIVITY DISORDER (ADHD), COMBINED TYPE: Status: RESOLVED | Noted: 2022-03-22 | Resolved: 2023-09-04

## 2023-09-04 PROBLEM — R07.89 ATYPICAL CHEST PAIN: Status: RESOLVED | Noted: 2022-09-07 | Resolved: 2023-09-04

## 2023-09-04 PROBLEM — L60.0 INGROWN TOENAIL OF BOTH FEET: Status: RESOLVED | Noted: 2022-09-07 | Resolved: 2023-09-04

## 2023-09-04 PROBLEM — R22.42 MASS OF LEFT LOWER LEG: Status: RESOLVED | Noted: 2021-07-08 | Resolved: 2023-09-04

## 2023-09-04 PROBLEM — B00.52: Status: RESOLVED | Noted: 2020-02-27 | Resolved: 2023-09-04

## 2023-09-04 PROBLEM — O28.9 ABNORMAL FINDINGS ON ANTENATAL SCREENING OF MOTHER: Status: RESOLVED | Noted: 2019-11-13 | Resolved: 2023-09-04

## 2023-09-04 PROBLEM — R63.5 WEIGHT GAIN: Status: RESOLVED | Noted: 2021-07-08 | Resolved: 2023-09-04

## 2023-09-04 PROBLEM — B02.9 HERPES ZOSTER WITHOUT COMPLICATION: Status: RESOLVED | Noted: 2017-06-26 | Resolved: 2023-09-04

## 2023-09-04 PROBLEM — R23.3 BRUISES EASILY: Status: RESOLVED | Noted: 2022-03-22 | Resolved: 2023-09-04

## 2023-09-04 PROBLEM — E66.01 MORBID OBESITY WITH BMI OF 40.0-44.9, ADULT (HCC): Status: RESOLVED | Noted: 2022-03-23 | Resolved: 2023-09-04

## 2023-09-04 ASSESSMENT — ENCOUNTER SYMPTOMS: ROS SKIN COMMENTS: PSORIASIS

## 2023-09-07 ENCOUNTER — OFFICE VISIT (OUTPATIENT)
Dept: FAMILY MEDICINE CLINIC | Age: 28
End: 2023-09-07
Payer: COMMERCIAL

## 2023-09-07 VITALS
BODY MASS INDEX: 45.21 KG/M2 | DIASTOLIC BLOOD PRESSURE: 68 MMHG | HEART RATE: 89 BPM | TEMPERATURE: 98.7 F | WEIGHT: 264.8 LBS | SYSTOLIC BLOOD PRESSURE: 111 MMHG | HEIGHT: 64 IN | OXYGEN SATURATION: 98 %

## 2023-09-07 DIAGNOSIS — R55 SYNCOPE, UNSPECIFIED SYNCOPE TYPE: Primary | ICD-10-CM

## 2023-09-07 DIAGNOSIS — R55 POSTURAL DIZZINESS WITH PRESYNCOPE: ICD-10-CM

## 2023-09-07 DIAGNOSIS — R51.9 HEADACHE DISORDER: ICD-10-CM

## 2023-09-07 DIAGNOSIS — R53.82 CHRONIC FATIGUE: ICD-10-CM

## 2023-09-07 DIAGNOSIS — F40.240 CLAUSTROPHOBIA: ICD-10-CM

## 2023-09-07 DIAGNOSIS — R42 POSTURAL DIZZINESS WITH PRESYNCOPE: ICD-10-CM

## 2023-09-07 PROCEDURE — 99214 OFFICE O/P EST MOD 30 MIN: CPT | Performed by: FAMILY MEDICINE

## 2023-09-07 RX ORDER — BUSPIRONE HYDROCHLORIDE 10 MG/1
10 TABLET ORAL 3 TIMES DAILY PRN
Qty: 9 TABLET | Refills: 0 | Status: SHIPPED | OUTPATIENT
Start: 2023-09-07

## 2023-09-07 SDOH — ECONOMIC STABILITY: FOOD INSECURITY: WITHIN THE PAST 12 MONTHS, YOU WORRIED THAT YOUR FOOD WOULD RUN OUT BEFORE YOU GOT MONEY TO BUY MORE.: NEVER TRUE

## 2023-09-07 SDOH — ECONOMIC STABILITY: FOOD INSECURITY: WITHIN THE PAST 12 MONTHS, THE FOOD YOU BOUGHT JUST DIDN'T LAST AND YOU DIDN'T HAVE MONEY TO GET MORE.: NEVER TRUE

## 2023-09-07 SDOH — ECONOMIC STABILITY: INCOME INSECURITY: HOW HARD IS IT FOR YOU TO PAY FOR THE VERY BASICS LIKE FOOD, HOUSING, MEDICAL CARE, AND HEATING?: NOT HARD AT ALL

## 2023-09-07 ASSESSMENT — PATIENT HEALTH QUESTIONNAIRE - PHQ9
SUM OF ALL RESPONSES TO PHQ QUESTIONS 1-9: 0
SUM OF ALL RESPONSES TO PHQ9 QUESTIONS 1 & 2: 0
2. FEELING DOWN, DEPRESSED OR HOPELESS: 0
SUM OF ALL RESPONSES TO PHQ QUESTIONS 1-9: 0
1. LITTLE INTEREST OR PLEASURE IN DOING THINGS: 0

## 2023-09-07 ASSESSMENT — ENCOUNTER SYMPTOMS
ROS SKIN COMMENTS: PSORIASIS
ABDOMINAL PAIN: 0
NAUSEA: 0
CHEST TIGHTNESS: 0
DIARRHEA: 0
ABDOMINAL DISTENTION: 0
VOMITING: 0
WHEEZING: 0
COUGH: 0
CONSTIPATION: 0
SHORTNESS OF BREATH: 0

## 2023-09-07 NOTE — PROGRESS NOTES
Visit Information    Have you changed or started any medications since your last visit including any over-the-counter medicines, vitamins, or herbal medicines? NO   Have you stopped taking any of your medications? Is so, why? -  no  Are you having any side effects from any of your medications? - no    Have you seen any other physician or provider since your last visit?  no   Have you had any other diagnostic tests since your last visit?  no   Have you been seen in the emergency room and/or had an admission in a hospital since we last saw you?  no   Have you had your routine dental cleaning in the past 6 months?  no     Do you have an active MyChart account? If no, what is the barrier?   Yes    Patient Care Team:  Jac Upton MD as PCP - General (Family Medicine)  Jac Upton MD as PCP - Empaneled Provider  Laura Boyd MD as Obstetrician (Perinatology)  Adiel Eller DO as Consulting Physician (Obstetrics & Gynecology)    Medical History Review  Past Medical, Family, and Social History reviewed and does contribute to the patient presenting condition    Health Maintenance   Topic Date Due    COVID-19 Vaccine (1) Never done    Flu vaccine (1) Never done    A1C test (Diabetic or Prediabetic)  08/30/2024    Depression Screen  08/30/2024    Pap smear  12/05/2025    DTaP/Tdap/Td vaccine (2 - Td or Tdap) 05/19/2033    Meningococcal (ACWY) vaccine  Completed    Hepatitis C screen  Completed    HIV screen  Completed    Hepatitis A vaccine  Aged Out    Hib vaccine  Aged Out    Pneumococcal 0-64 years Vaccine  Aged Out    Varicella vaccine  Discontinued
08/31/2022 10:31 PM    CREATININE 1.06 08/31/2022 10:31 PM    GLUCOSE 93 08/31/2022 10:31 PM    CALCIUM 9.6 08/31/2022 10:31 PM        Lab Results   Component Value Date    ALT 10 06/13/2022    AST 13 06/13/2022    ALKPHOS 65 06/13/2022    BILITOT 0.40 06/13/2022       Lab Results   Component Value Date    TSH 0.93 06/13/2022         No results found for: Violet Mater    No results found for: FOLATE    Lab Results   Component Value Date    VITD25 41.1 06/13/2022         No orders of the defined types were placed in this encounter. Orders Placed This Encounter   Medications    busPIRone (BUSPAR) 10 MG tablet     Sig: Take 1 tablet by mouth 3 times daily as needed (Anxiety, claustrophobia)     Dispense:  9 tablet     Refill:  0       There are no discontinued medications. On this date 9/7/2023 I have spent 39 minutes reviewing previous notes, test results and face to face with the patient discussing the diagnosis and importance of compliance with the treatment plan as well as documenting on the day of the visit. This note was completed by using the assistance of a speech-recognition program. However, inadvertent computerized transcription errors may be present. Although every effort was made to ensure accuracy, no guarantees can be provided that every mistake has been identified and corrected by editing. An electronic signature was used to authenticate this note.   Electronically signed by Tomasa Eisenmenger, MD on 9/7/2023 at 8:26 PM

## 2023-09-11 ENCOUNTER — HOSPITAL ENCOUNTER (OUTPATIENT)
Dept: MRI IMAGING | Age: 28
Discharge: HOME | End: 2023-09-13
Attending: FAMILY MEDICINE
Payer: COMMERCIAL

## 2023-09-11 ENCOUNTER — HOSPITAL ENCOUNTER (OUTPATIENT)
Dept: NEUROLOGY | Age: 28
Discharge: HOME | End: 2023-09-11
Attending: FAMILY MEDICINE
Payer: COMMERCIAL

## 2023-09-11 DIAGNOSIS — R55 SYNCOPE, UNSPECIFIED SYNCOPE TYPE: ICD-10-CM

## 2023-09-11 DIAGNOSIS — R51.9 HEADACHE DISORDER: ICD-10-CM

## 2023-09-11 PROCEDURE — 70551 MRI BRAIN STEM W/O DYE: CPT

## 2023-09-11 PROCEDURE — 95819 EEG AWAKE AND ASLEEP: CPT

## 2023-09-11 PROCEDURE — 95819 EEG AWAKE AND ASLEEP: CPT | Performed by: PSYCHIATRY & NEUROLOGY

## 2023-09-11 NOTE — PROCEDURES
Drew Gamma    D: 09/11/2023 17:23:55       T: 09/11/2023 17:26:53     [email or web address]  Job#: [id number]     Doc#: [id number]    CC:

## 2023-09-13 ENCOUNTER — TELEPHONE (OUTPATIENT)
Dept: FAMILY MEDICINE CLINIC | Age: 28
End: 2023-09-13

## 2023-09-13 DIAGNOSIS — R51.9 HEADACHE DISORDER: Primary | ICD-10-CM

## 2023-09-13 DIAGNOSIS — R55 SYNCOPE, UNSPECIFIED SYNCOPE TYPE: ICD-10-CM

## 2023-09-13 DIAGNOSIS — F40.240 CLAUSTROPHOBIA: ICD-10-CM

## 2023-09-13 NOTE — TELEPHONE ENCOUNTER
I wrote a open MRI in the body of the MRI     Diagnosis Orders   1. Headache disorder  MRI BRAIN W WO CONTRAST      2. Syncope, unspecified syncope type  MRI BRAIN W WO CONTRAST      3.  Claustrophobia  MRI BRAIN W WO CONTRAST         Orders Placed This Encounter   Procedures    MRI BRAIN W WO CONTRAST     Standing Status:   Future     Standing Expiration Date:   9/12/2024     Order Specific Question:   STAT Creatinine as needed:     Answer:   Yes     Order Specific Question:   Reason for exam:     Answer:   Patient needs open MRI due to severe anxiety and claustrophobia       Future Appointments   Date Time Provider [address] [place] [address] Ct   9/18/2023  1:30 PM Ani Keita MD [facility]   10/2/2023  4:00 PM Lyric Bueno Neuro Spec Neurology -   10/18/2023  3:30 PM BRIANNE [place] - [place] fp sc Delilah Valdes

## 2023-09-13 NOTE — TELEPHONE ENCOUNTER
Patient will need a new MRI test ordered. I will call her once it has been placed and she can call scheduling back. They have a location in [facility] on [facility] that does open MRI.

## 2023-09-13 NOTE — TELEPHONE ENCOUNTER
Patient called in earlier and is needing to know where she can go for an open MRI as she was unable to get the MRI done the other day. She did state that she used the medication for anxiety but it was unsuccessful.

## 2023-09-15 NOTE — RESULT ENCOUNTER NOTE
Please notify patient: Normal EEG, no seizures        Future Appointments  9/18/2023  1:30 PM    Rhiannon Cotto MD     [facility]        [facility]  9/25/2023  2:00 PM    [facility] MRI RM 1           [facility]           [address]  10/2/2023  4:00 PM    Lissy Do,  [address]      Neuro Spec          Neurology -  10/18/2023 3:30 PM    Andrei Jha, APRN - CNP    fp ari [facility]

## 2023-10-16 ENCOUNTER — HOSPITAL ENCOUNTER (OUTPATIENT)
Dept: MRI IMAGING | Facility: CLINIC | Age: 28
Discharge: HOME | End: 2023-10-18
Attending: FAMILY MEDICINE
Payer: COMMERCIAL

## 2023-10-16 DIAGNOSIS — R55 SYNCOPE, UNSPECIFIED SYNCOPE TYPE: ICD-10-CM

## 2023-10-16 DIAGNOSIS — F40.240 CLAUSTROPHOBIA: ICD-10-CM

## 2023-10-16 DIAGNOSIS — R51.9 HEADACHE DISORDER: ICD-10-CM

## 2023-10-16 PROCEDURE — 70551 MRI BRAIN STEM W/O DYE: CPT

## 2023-10-18 NOTE — RESULT ENCOUNTER NOTE
Please notify patient: Normal MRI      Future Appointments  11/7/2023  9:40 AM    CHELLE Reilly     Neuro Spec          Neurology -  11/13/2023 4:30 PM    BRIANNE Guerrero - [place]   fp ari [place]

## 2023-11-07 ENCOUNTER — TELEPHONE (OUTPATIENT)
Dept: NEUROLOGY | Age: 28
End: 2023-11-07

## 2023-11-13 ENCOUNTER — OFFICE VISIT (OUTPATIENT)
Dept: FAMILY MEDICINE CLINIC | Age: 28
End: 2023-11-13
Payer: COMMERCIAL

## 2023-11-13 VITALS
SYSTOLIC BLOOD PRESSURE: 134 MMHG | TEMPERATURE: 98.1 F | BODY MASS INDEX: 49.27 KG/M2 | HEIGHT: 64 IN | OXYGEN SATURATION: 97 % | WEIGHT: 288.6 LBS | DIASTOLIC BLOOD PRESSURE: 80 MMHG | HEART RATE: 85 BPM

## 2023-11-13 DIAGNOSIS — R55 POSTURAL DIZZINESS WITH PRESYNCOPE: ICD-10-CM

## 2023-11-13 DIAGNOSIS — Z13.220 SCREENING FOR HYPERLIPIDEMIA: ICD-10-CM

## 2023-11-13 DIAGNOSIS — R63.5 WEIGHT GAIN: ICD-10-CM

## 2023-11-13 DIAGNOSIS — R42 POSTURAL DIZZINESS WITH PRESYNCOPE: ICD-10-CM

## 2023-11-13 DIAGNOSIS — Z11.59 SCREENING FOR VIRAL DISEASE: ICD-10-CM

## 2023-11-13 PROCEDURE — 99215 OFFICE O/P EST HI 40 MIN: CPT | Performed by: NURSE PRACTITIONER

## 2023-11-13 SDOH — ECONOMIC STABILITY: FOOD INSECURITY: WITHIN THE PAST 12 MONTHS, THE FOOD YOU BOUGHT JUST DIDN'T LAST AND YOU DIDN'T HAVE MONEY TO GET MORE.: NEVER TRUE

## 2023-11-13 SDOH — ECONOMIC STABILITY: INCOME INSECURITY: HOW HARD IS IT FOR YOU TO PAY FOR THE VERY BASICS LIKE FOOD, HOUSING, MEDICAL CARE, AND HEATING?: NOT HARD AT ALL

## 2023-11-13 SDOH — ECONOMIC STABILITY: FOOD INSECURITY: WITHIN THE PAST 12 MONTHS, YOU WORRIED THAT YOUR FOOD WOULD RUN OUT BEFORE YOU GOT MONEY TO BUY MORE.: NEVER TRUE

## 2023-11-13 ASSESSMENT — PATIENT HEALTH QUESTIONNAIRE - PHQ9
SUM OF ALL RESPONSES TO PHQ QUESTIONS 1-9: 1
SUM OF ALL RESPONSES TO PHQ QUESTIONS 1-9: 1
2. FEELING DOWN, DEPRESSED OR HOPELESS: 1
SUM OF ALL RESPONSES TO PHQ QUESTIONS 1-9: 1
SUM OF ALL RESPONSES TO PHQ QUESTIONS 1-9: 1
1. LITTLE INTEREST OR PLEASURE IN DOING THINGS: 0
SUM OF ALL RESPONSES TO PHQ9 QUESTIONS 1 & 2: 1

## 2023-11-13 NOTE — PROGRESS NOTES
Visit Information    Have you changed or started any medications since your last visit including any over-the-counter medicines, vitamins, or herbal medicines? no   Have you stopped taking any of your medications? Is so, why? -  no  Are you having any side effects from any of your medications? - no    Have you seen any other physician or provider since your last visit?  no   Have you had any other diagnostic tests since your last visit? yes  Have you been seen in the emergency room and/or had an admission in a hospital since we last saw you?  no   Have you had your routine dental cleaning in the past 6 months?  no     Do you have an active MyChart account? If no, what is the barrier?   Yes    Patient Care Team:  Harish Mccarty MD as PCP - General (Family Medicine)  Harish Mccarty MD as PCP - Empaneled Provider  Campbell Edge MD as Obstetrician (Perinatology)  [employer],  as Consulting Physician (Obstetrics & Gynecology)  Florecita Pace MD as Consulting Physician (Cardiology)  Lyric Arreguin as Physician Assistant (Neurology)    Medical History Review  Past Medical, Family, and Social History reviewed and does contribute to the patient presenting condition    Health Maintenance   Topic Date Due    Hepatitis B vaccine (1 of 3 - 3-dose series) Never done    COVID-19 Vaccine (1) Never done    Flu vaccine (1) Never done    A1C test (Diabetic or Prediabetic)  08/30/2024    Depression Screen  09/07/2024    Pap smear  12/05/2025    DTaP/Tdap/Td vaccine (2 - Td or Tdap) 05/19/2033    Meningococcal (ACWY) vaccine  Completed    Hepatitis C screen  Completed    HIV screen  Completed    Hepatitis A vaccine  Aged Out    Hib vaccine  Aged Out    HPV vaccine  Aged Out    Pneumococcal 0-64 years Vaccine  Aged Out    Varicella vaccine  Discontinued
dry.      Capillary Refill: Capillary refill takes less than 2 seconds. Findings: No rash. Neurological:      Mental Status: She is alert and oriented to person, place, and time. Cranial Nerves: No cranial nerve deficit. Motor: No abnormal muscle tone. Gait: Gait normal.      Deep Tendon Reflexes: Reflexes normal.      Reflex Scores:       Patellar reflexes are 2+ on the right side and 2+ on the left side. Psychiatric:         Mood and Affect: Mood normal.         Behavior: Behavior normal.         Thought Content: Thought content normal.         Judgment: Judgment normal.         I personally reviewed testing with patient, and all questions answered. Lab Results   Component Value Date    WBC 9.3 08/31/2022    HGB 12.9 08/31/2022    HCT 40.0 08/31/2022    MCV 81.0 08/31/2022     08/31/2022       Lab Results   Component Value Date/Time     08/31/2022 10:31 PM    K 4.1 08/31/2022 10:31 PM     08/31/2022 10:31 PM    CO2 26 08/31/2022 10:31 PM    BUN 8 08/31/2022 10:31 PM    CREATININE 1.06 08/31/2022 10:31 PM    GLUCOSE 93 08/31/2022 10:31 PM    CALCIUM 9.6 08/31/2022 10:31 PM        Lab Results   Component Value Date    ALT 10 06/13/2022    AST 13 06/13/2022    ALKPHOS 65 06/13/2022    BILITOT 0.40 06/13/2022       Lab Results   Component Value Date    TSH 0.93 06/13/2022       Lab Results   Component Value Date    CHOL 160 06/17/2016     Lab Results   Component Value Date    TRIG 120 06/17/2016     Lab Results   Component Value Date    HDL 34 (L) 06/17/2016     Lab Results   Component Value Date    LDLCHOLESTEROL 102 06/17/2016     Lab Results   Component Value Date    CHOLHDLRATIO 4.7 06/17/2016         No results found for: \"[id number]\"  No results found for: \"FOLATE\"  Lab Results   Component Value Date    VITD25 41.1 06/13/2022         Return in about 4 weeks (around 12/11/2023) for Discuss lab results.       This note was completed by using the assistance of a

## 2023-11-14 ENCOUNTER — HOSPITAL ENCOUNTER (OUTPATIENT)
Age: 28
Discharge: HOME | End: 2023-11-14
Payer: COMMERCIAL

## 2023-11-14 DIAGNOSIS — R55 SYNCOPE, UNSPECIFIED SYNCOPE TYPE: ICD-10-CM

## 2023-11-14 DIAGNOSIS — R63.5 WEIGHT GAIN: ICD-10-CM

## 2023-11-14 DIAGNOSIS — Z11.59 SCREENING FOR VIRAL DISEASE: ICD-10-CM

## 2023-11-14 DIAGNOSIS — Z13.220 SCREENING FOR HYPERLIPIDEMIA: ICD-10-CM

## 2023-11-14 DIAGNOSIS — R53.82 CHRONIC FATIGUE: ICD-10-CM

## 2023-11-14 LAB
ALBUMIN SERPL-MCNC: 4.1 G/DL (ref 3.5–5.2)
ALP SERPL-CCNC: 113 U/L (ref 35–104)
ALT SERPL-CCNC: 21 U/L (ref 5–33)
ANION GAP SERPL CALCULATED.3IONS-SCNC: 12 MMOL/L (ref 9–17)
AST SERPL-CCNC: 19 U/L
BASOPHILS # BLD: 0.06 K/UL (ref 0–0.2)
BASOPHILS NFR BLD: 1 % (ref 0–2)
BILIRUB SERPL-MCNC: 0.5 MG/DL (ref 0.3–1.2)
BUN SERPL-MCNC: 21 MG/DL (ref 6–20)
CALCIUM SERPL-MCNC: 9.1 MG/DL (ref 8.6–10.4)
CHLORIDE SERPL-SCNC: 101 MMOL/L (ref 98–107)
CHOLEST SERPL-MCNC: 188 MG/DL
CHOLESTEROL/HDL RATIO: 5.2
CO2 SERPL-SCNC: 26 MMOL/L (ref 20–31)
CREAT SERPL-MCNC: 0.8 MG/DL (ref 0.5–0.9)
EOSINOPHIL # BLD: 0.06 K/UL (ref 0–0.4)
EOSINOPHILS RELATIVE PERCENT: 1 % (ref 0–4)
ERYTHROCYTE [DISTWIDTH] IN BLOOD BY AUTOMATED COUNT: 15.1 % (ref 11.5–14.9)
GFR SERPL CREATININE-BSD FRML MDRD: >60 ML/MIN/1.73M2
GLUCOSE SERPL-MCNC: 110 MG/DL (ref 70–99)
HCT VFR BLD AUTO: 38.9 % (ref 36–46)
HDLC SERPL-MCNC: 36 MG/DL
HGB BLD-MCNC: 12.5 G/DL (ref 12–16)
INSULIN COMMENT: NORMAL
INSULIN REFERENCE RANGE:: NORMAL
INSULIN: 24.6 MU/L
LDLC SERPL CALC-MCNC: 114 MG/DL (ref 0–130)
LYMPHOCYTES NFR BLD: 1.76 K/UL (ref 1–4.8)
LYMPHOCYTES RELATIVE PERCENT: 28 % (ref 24–44)
MCH RBC QN AUTO: 25.2 PG (ref 26–34)
MCHC RBC AUTO-ENTMCNC: 32.2 G/DL (ref 31–37)
MCV RBC AUTO: 78.2 FL (ref 80–100)
MONOCYTES NFR BLD: 0.38 K/UL (ref 0.1–1.3)
MONOCYTES NFR BLD: 6 % (ref 1–7)
MORPHOLOGY: ABNORMAL
NEUTROPHILS NFR BLD: 64 % (ref 36–66)
NEUTS SEG NFR BLD: 4.04 K/UL (ref 1.3–9.1)
PLATELET # BLD AUTO: 246 K/UL (ref 150–450)
PMV BLD AUTO: 8.3 FL (ref 6–12)
POTASSIUM SERPL-SCNC: 4.5 MMOL/L (ref 3.7–5.3)
PROT SERPL-MCNC: 6.9 G/DL (ref 6.4–8.3)
RBC # BLD AUTO: 4.97 M/UL (ref 4–5.2)
SODIUM SERPL-SCNC: 139 MMOL/L (ref 135–144)
T4 FREE SERPL-MCNC: 1.1 NG/DL (ref 0.9–1.7)
TRIGL SERPL-MCNC: 191 MG/DL
TSH SERPL DL<=0.05 MIU/L-ACNC: 0.82 UIU/ML (ref 0.3–5)
WBC OTHER # BLD: 6.3 K/UL (ref 3.5–11)

## 2023-11-14 PROCEDURE — 85025 COMPLETE CBC W/AUTO DIFF WBC: CPT

## 2023-11-14 PROCEDURE — 36415 COLL VENOUS BLD VENIPUNCTURE: CPT

## 2023-11-14 PROCEDURE — 83525 ASSAY OF INSULIN: CPT

## 2023-11-14 PROCEDURE — 84270 ASSAY OF SEX HORMONE GLOBUL: CPT

## 2023-11-14 PROCEDURE — 84439 ASSAY OF FREE THYROXINE: CPT

## 2023-11-14 PROCEDURE — 84443 ASSAY THYROID STIM HORMONE: CPT

## 2023-11-14 PROCEDURE — 86317 IMMUNOASSAY INFECTIOUS AGENT: CPT

## 2023-11-14 PROCEDURE — 80053 COMPREHEN METABOLIC PANEL: CPT

## 2023-11-14 PROCEDURE — 80061 LIPID PANEL: CPT

## 2023-11-14 ASSESSMENT — ENCOUNTER SYMPTOMS
ABDOMINAL PAIN: 0
CONSTIPATION: 0
ABDOMINAL DISTENTION: 0
BACK PAIN: 0
COUGH: 0
BLOOD IN STOOL: 0
DIARRHEA: 0
CHEST TIGHTNESS: 0
NAUSEA: 0
VOMITING: 0
SHORTNESS OF BREATH: 0
WHEEZING: 0

## 2023-11-14 NOTE — RESULT ENCOUNTER NOTE
Please notify patient: The mild kidney disease, to make sure she stays hydrated, to drink 8 x 8 ounce glasses of water every day  Blood glucose mildly increased 110, low-carb diet, no pop advised  1 liver test is mildly high which is new from prior, we can recheck at the next appointment, on the CT abdomen from 5/28/2023, liver and gallbladder are unremarkable  Mild anemia, to make sure she takes her prenatal vitamin every day, that has iron  Otherwise labs within normal limits  continue current treatment    No future appointments.

## 2023-11-15 ENCOUNTER — HOSPITAL ENCOUNTER (OUTPATIENT)
Age: 28
Setting detail: SPECIMEN
Discharge: HOME | End: 2023-11-15
Payer: COMMERCIAL

## 2023-11-15 LAB — SHBG SERPL-SCNC: 35 NMOL/L (ref 30–135)

## 2023-11-15 PROCEDURE — 82533 TOTAL CORTISOL: CPT

## 2023-11-17 LAB — HBV SURFACE AB SERPL IA-ACNC: <3.5 MIU/ML

## 2023-11-18 NOTE — RESULT ENCOUNTER NOTE
----- Message from Aliza Hanley sent at 9/18/2017 11:28 AM CDT -----  Contact: Self [phone]  Patient is calling to talk to nurse concerning her medication needs prior authorization. Please advice    Please notify patient that her triglycerides are high at 191, cholesterol is normal and HDL is low. Suggest diet changes, lower fat and carbs. Avoid Pop and sugary drinks/snacks. Avoid late night snacking and eat throughout the day. No immune to Hep B and would benefit from immunization. Other labs WNL at this time. In regards to weight gain, florentino benefit from dietitian referral, maybe endocrine referral. We can discuss more at our next visit or let me know if she should like referrals.

## 2023-11-21 LAB — CORTISOL SALIVARY: <0.025 UG/DL

## 2023-11-25 DIAGNOSIS — R79.89 LOW SERUM CORTISOL LEVEL: Primary | ICD-10-CM

## 2023-12-04 ENCOUNTER — OFFICE VISIT (OUTPATIENT)
Dept: FAMILY MEDICINE CLINIC | Age: 28
End: 2023-12-04
Payer: COMMERCIAL

## 2023-12-04 VITALS
DIASTOLIC BLOOD PRESSURE: 80 MMHG | WEIGHT: 293 LBS | BODY MASS INDEX: 50.02 KG/M2 | HEIGHT: 64 IN | TEMPERATURE: 97.2 F | HEART RATE: 78 BPM | SYSTOLIC BLOOD PRESSURE: 120 MMHG | OXYGEN SATURATION: 98 %

## 2023-12-04 DIAGNOSIS — E66.01 CLASS 3 SEVERE OBESITY WITH BODY MASS INDEX (BMI) OF 50.0 TO 59.9 IN ADULT, UNSPECIFIED OBESITY TYPE, UNSPECIFIED WHETHER SERIOUS COMORBIDITY PRESENT (HCC): Primary | ICD-10-CM

## 2023-12-04 DIAGNOSIS — R73.9 HYPERGLYCEMIA: ICD-10-CM

## 2023-12-04 DIAGNOSIS — F32.A DEPRESSION, UNSPECIFIED DEPRESSION TYPE: ICD-10-CM

## 2023-12-04 PROCEDURE — 99214 OFFICE O/P EST MOD 30 MIN: CPT | Performed by: NURSE PRACTITIONER

## 2023-12-04 SDOH — ECONOMIC STABILITY: FOOD INSECURITY: WITHIN THE PAST 12 MONTHS, THE FOOD YOU BOUGHT JUST DIDN'T LAST AND YOU DIDN'T HAVE MONEY TO GET MORE.: NEVER TRUE

## 2023-12-04 SDOH — ECONOMIC STABILITY: INCOME INSECURITY: HOW HARD IS IT FOR YOU TO PAY FOR THE VERY BASICS LIKE FOOD, HOUSING, MEDICAL CARE, AND HEATING?: NOT HARD AT ALL

## 2023-12-04 SDOH — ECONOMIC STABILITY: FOOD INSECURITY: WITHIN THE PAST 12 MONTHS, YOU WORRIED THAT YOUR FOOD WOULD RUN OUT BEFORE YOU GOT MONEY TO BUY MORE.: NEVER TRUE

## 2023-12-04 ASSESSMENT — ENCOUNTER SYMPTOMS
BACK PAIN: 0
BLOOD IN STOOL: 0
COUGH: 0
SHORTNESS OF BREATH: 0
CONSTIPATION: 0
ABDOMINAL PAIN: 0
DIARRHEA: 0
ABDOMINAL DISTENTION: 0
CHEST TIGHTNESS: 0
NAUSEA: 0
WHEEZING: 0
VOMITING: 0

## 2023-12-04 ASSESSMENT — PATIENT HEALTH QUESTIONNAIRE - PHQ9
2. FEELING DOWN, DEPRESSED OR HOPELESS: 1
SUM OF ALL RESPONSES TO PHQ QUESTIONS 1-9: 2
1. LITTLE INTEREST OR PLEASURE IN DOING THINGS: 1
SUM OF ALL RESPONSES TO PHQ9 QUESTIONS 1 & 2: 2
SUM OF ALL RESPONSES TO PHQ QUESTIONS 1-9: 2

## 2023-12-04 NOTE — PROGRESS NOTES
Susan Burrell (:  1995) is a 33 y.o. female,Established patient, here for evaluation of the following chief complaint(s): Weight Management (Wants to lose weight ) and Immunizations (No to all vaccines )      ASSESSMENT/PLAN:    [facility] Sarah received counseling on the following healthy behaviors: nutrition, exercise, and medication adherence  Reviewed prior labs and health maintenance  Discussed use, benefit, and side effects of prescribed medications. Barriers to medication compliance addressed. Patient given educational materials - see patient instructions  All patient questions answered. Patient voiced understanding. The patient's past medical,surgical, social, and family history as well as her current medications and allergies were reviewed as documented in today's encounter. Medications, labs, diagnostic studies, consultations and follow-up as documented in this encounter. Rainer Smith was seen today for weight management and immunizations. Diagnoses and all orders for this visit:    Class 3 severe obesity with body mass index (BMI) of 50.0 to 59.9 in adult, unspecified obesity type, unspecified whether serious comorbidity present ([address] [facility])  -Worsening  -Plan for weight management referral  -Discussed lifestyle changes and increasing exercise as tolerated  -Discussed medication options, which she will be touching base with weight management which she agrees with  -     Hemoglobin A1C; Future  -     [facility]etta Sacks, CNP, Weight Management and Bariatrics, [place]    Hyperglycemia  -Unsure if improving or not  -A1c ordered  -     Hemoglobin A1C; Future    Depression, unspecified depression type  -Worsening as of recently  -Reports is worsening due to her weight changes  -Plan to follow-up with behavioral health  -No thoughts of self-harm or harming others at this time  -     [address] [facility])      Prior to Visit Medications    Medication Sig Taking?

## 2023-12-04 NOTE — PROGRESS NOTES
Visit Information    Have you changed or started any medications since your last visit including any over-the-counter medicines, vitamins, or herbal medicines? no   Have you stopped taking any of your medications? Is so, why? -  no  Are you having any side effects from any of your medications? - no    Have you seen any other physician or provider since your last visit?  no   Have you had any other diagnostic tests since your last visit?  no   Have you been seen in the emergency room and/or had an admission in a hospital since we last saw you?  no   Have you had your routine dental cleaning in the past 6 months?  no     Do you have an active MyChart account? If no, what is the barrier?   Yes    Patient Care Team:  Tomasa Eisenmenger, MD as PCP - General (Family Medicine)  Tomasa Eisenmenger, MD as PCP - Empaneled Provider  Cory Wallace MD as Obstetrician (Perinatology)  Glen García DO as Consulting Physician (Obstetrics & Gynecology)  Rhiannon Cotto MD as Consulting Physician (Cardiology)  Lyric Jessica as Physician Assistant (Neurology)    Medical History Review  Past Medical, Family, and Social History reviewed and does contribute to the patient presenting condition    Health Maintenance   Topic Date Due    Hepatitis B vaccine (1 of 3 - 3-dose series) Never done    COVID-19 Vaccine (1) 11/01/2024 (Originally 1995)    Flu vaccine (1) 11/13/2024 (Originally 8/1/2023)    A1C test (Diabetic or Prediabetic)  08/30/2024    Depression Screen  11/13/2024    Pap smear  12/05/2025    DTaP/Tdap/Td vaccine (2 - Td or Tdap) 05/19/2033    Meningococcal (ACWY) vaccine  Completed    Hepatitis C screen  Completed    HIV screen  Completed    Hepatitis A vaccine  Aged Out    Hib vaccine  Aged Out    HPV vaccine  Aged Out    Pneumococcal 0-64 years Vaccine  Aged Out    Varicella vaccine  Discontinued

## 2023-12-05 ENCOUNTER — OFFICE VISIT (OUTPATIENT)
Dept: BARIATRICS/WEIGHT MGMT | Age: 28
End: 2023-12-05
Payer: COMMERCIAL

## 2023-12-05 VITALS
RESPIRATION RATE: 18 BRPM | BODY MASS INDEX: 49.68 KG/M2 | HEART RATE: 80 BPM | WEIGHT: 291 LBS | DIASTOLIC BLOOD PRESSURE: 82 MMHG | HEIGHT: 64 IN | SYSTOLIC BLOOD PRESSURE: 128 MMHG

## 2023-12-05 DIAGNOSIS — R73.03 PREDIABETES: ICD-10-CM

## 2023-12-05 DIAGNOSIS — E88.819 INSULIN RESISTANCE: ICD-10-CM

## 2023-12-05 DIAGNOSIS — E28.2 PCOS (POLYCYSTIC OVARIAN SYNDROME): Primary | ICD-10-CM

## 2023-12-05 DIAGNOSIS — R79.89 LOW SERUM CORTISOL LEVEL: ICD-10-CM

## 2023-12-05 DIAGNOSIS — E28.2 PCOS (POLYCYSTIC OVARIAN SYNDROME): ICD-10-CM

## 2023-12-05 PROCEDURE — 99204 OFFICE O/P NEW MOD 45 MIN: CPT | Performed by: NURSE PRACTITIONER

## 2023-12-05 RX ORDER — METFORMIN HYDROCHLORIDE 500 MG/1
500 TABLET, EXTENDED RELEASE ORAL
Qty: 90 TABLET | Refills: 1 | Status: SHIPPED | OUTPATIENT
Start: 2023-12-05

## 2023-12-05 RX ORDER — METFORMIN HYDROCHLORIDE 500 MG/1
500 TABLET, EXTENDED RELEASE ORAL
Qty: 90 TABLET | Refills: 1 | Status: SHIPPED | OUTPATIENT
Start: 2023-12-05 | End: 2023-12-05 | Stop reason: SDUPTHER

## 2023-12-05 NOTE — PROGRESS NOTES
[facility] INVASIVE BARIATRIC SURG  [address] [address]  Dept: [phone]  Fax: [phone]    MEDICATION WEIGHT LOSS MANAGEMENT PROGRAM  PROGRESS NOTE      CC: Weight Loss      Subjective:       Skip Gómez is a 33 y.o. female who I am asked to see in consultation for evaluation and treatment of obesity. Patient cites health, increased physical ability, self-image as reasons for wanting to lose weight. She is 7 months post partum  Still breast feeding but she is thinking about stopping soon. It is starting to affect her mental health. She is also going to be seeing a counselor this month. Obesity History  Weight in late teens: 150 lbs. Period of greatest weight gain: 80 lbs during early adult years  Lowest adult weight: 142 lbs  Highest adult weight: 291 lbs       History of Weight Loss Efforts  Greatest amount of weight lost: 80 lbs over 12 months  Amount of time that loss was maintained: 6-8 months  Circumstances associated with regain of weight: unsure. She state that she doesn't know what the circumstances were that caused her to lose 80 pounds but she states after 6 months when weight loss stabilize - she gained it all back but doesn't know what caused her to gain it back. Successful weight loss techniques attempted:  nothing specific, she states when she started losing weight previously - she wasn't trying or doing anything for the weight loss  Unsuccessful weight loss techniques attempted: self-directed dieting and keto    Current Exercise Habits  walks her on treadmill every day    Current Eating Habits  Number of regular meals per day: 2  Number of snacking episodes per day: 3  Who shops for food? patient  Who prepares food? patient  Who eats with patient? son and   Binge behavior?: no  Purge behavior? no  Anorexic behavior? no  Eating precipitated by stress? no  Guilt feelings associated with eating?

## 2023-12-05 NOTE — PATIENT INSTRUCTIONS
Weight loss medications:    Amber [facility] and [facility] are not approved for obesity

## 2024-01-02 ENCOUNTER — OFFICE VISIT (OUTPATIENT)
Dept: BARIATRICS/WEIGHT MGMT | Age: 29
End: 2024-01-02
Payer: COMMERCIAL

## 2024-01-02 VITALS
DIASTOLIC BLOOD PRESSURE: 80 MMHG | SYSTOLIC BLOOD PRESSURE: 122 MMHG | HEIGHT: 64 IN | HEART RATE: 78 BPM | OXYGEN SATURATION: 99 % | BODY MASS INDEX: 50.02 KG/M2 | WEIGHT: 293 LBS

## 2024-01-02 DIAGNOSIS — E88.819 INSULIN RESISTANCE: ICD-10-CM

## 2024-01-02 DIAGNOSIS — E66.01 MORBID OBESITY WITH BMI OF 50.0-59.9, ADULT (HCC): Primary | ICD-10-CM

## 2024-01-02 DIAGNOSIS — R63.5 WEIGHT GAIN: ICD-10-CM

## 2024-01-02 DIAGNOSIS — E28.2 PCOS (POLYCYSTIC OVARIAN SYNDROME): ICD-10-CM

## 2024-01-02 DIAGNOSIS — F90.0 ATTENTION DEFICIT HYPERACTIVITY DISORDER (ADHD), PREDOMINANTLY INATTENTIVE TYPE: ICD-10-CM

## 2024-01-02 DIAGNOSIS — R73.03 PREDIABETES: ICD-10-CM

## 2024-01-02 DIAGNOSIS — R79.89 LOW SERUM CORTISOL LEVEL: ICD-10-CM

## 2024-01-02 PROCEDURE — 99213 OFFICE O/P EST LOW 20 MIN: CPT | Performed by: NURSE PRACTITIONER

## 2024-01-02 ASSESSMENT — ENCOUNTER SYMPTOMS
ABDOMINAL PAIN: 0
NAUSEA: 0
CHEST TIGHTNESS: 0
SHORTNESS OF BREATH: 0
WHEEZING: 0
VOMITING: 0
COUGH: 0

## 2024-01-02 NOTE — PROGRESS NOTES
Negative for dizziness, weakness, light-headedness and headaches.   Psychiatric/Behavioral:  Positive for decreased concentration. Negative for self-injury and suicidal ideas. The patient is nervous/anxious.    All other systems reviewed and are negative.      Physical Assessment:     /80   Pulse 78   Ht 1.626 m (5' 4\")   Wt 136.1 kg (300 lb)   LMP 12/05/2023   SpO2 99%   BMI 51.49 kg/m²     Physical Exam  Vitals and nursing note reviewed.   Constitutional:       General: She is not in acute distress.     Appearance: Normal appearance. She is obese. She is not ill-appearing, toxic-appearing or diaphoretic.   HENT:      Head: Normocephalic and atraumatic.      Mouth/Throat:      Mouth: Mucous membranes are moist.   Cardiovascular:      Rate and Rhythm: Normal rate and regular rhythm.      Pulses: Normal pulses.      Heart sounds: Normal heart sounds. No murmur heard.  Pulmonary:      Effort: Pulmonary effort is normal.      Breath sounds: Normal breath sounds. No wheezing or rhonchi.   Musculoskeletal:      Cervical back: Neck supple.   Skin:     General: Skin is warm and dry.   Neurological:      General: No focal deficit present.      Mental Status: She is alert and oriented to person, place, and time.      Gait: Gait normal.   Psychiatric:         Mood and Affect: Mood normal.         Behavior: Behavior normal.         Thought Content: Thought content normal.         Judgment: Judgment normal.         Assessment & Plan:      1. Morbid obesity with BMI of 50.0-59.9, adult (HCC)    2. Prediabetes    3. Insulin resistance    4. Low serum cortisol level    5. PCOS (polycystic ovarian syndrome)    6. Weight gain    7. Attention deficit hyperactivity disorder (ADHD), predominantly inattentive type           [x] Daily protein (65-75gm/day)   [x] Exercise Regularly     Home exercises for her back pain given on avs  Need for long term compliance and follow up stressed to patient  Handouts for lean protein and

## 2024-01-05 ENCOUNTER — HOSPITAL ENCOUNTER (OUTPATIENT)
Age: 29
Discharge: HOME | End: 2024-01-05
Payer: COMMERCIAL

## 2024-01-05 DIAGNOSIS — E66.01 CLASS 3 SEVERE OBESITY WITH BODY MASS INDEX (BMI) OF 50.0 TO 59.9 IN ADULT, UNSPECIFIED OBESITY TYPE, UNSPECIFIED WHETHER SERIOUS COMORBIDITY PRESENT (HCC): ICD-10-CM

## 2024-01-05 DIAGNOSIS — R73.9 HYPERGLYCEMIA: ICD-10-CM

## 2024-01-05 DIAGNOSIS — R79.89 LOW SERUM CORTISOL LEVEL: ICD-10-CM

## 2024-01-05 LAB
CORTIS SERPL-MCNC: 4.4 UG/DL (ref 2.7–18.4)
CORTISOL COLLECTION INFO: NORMAL
EST. AVERAGE GLUCOSE BLD GHB EST-MCNC: 105 MG/DL
HBA1C MFR BLD: 5.3 % (ref 4–6)

## 2024-01-05 PROCEDURE — 83036 HEMOGLOBIN GLYCOSYLATED A1C: CPT

## 2024-01-05 PROCEDURE — 36415 COLL VENOUS BLD VENIPUNCTURE: CPT

## 2024-01-05 PROCEDURE — 82533 TOTAL CORTISOL: CPT

## 2024-01-05 PROCEDURE — 82024 ASSAY OF ACTH: CPT

## 2024-01-06 LAB — ACTH PLAS-MCNC: 27 PG/ML (ref 7–63)

## 2024-01-06 NOTE — RESULT ENCOUNTER NOTE
Please notify patient that A1c is slightly worsening. Continue current regimen, recommend lifestyle changes such as diet and increase exercise as tolerated.

## 2024-01-10 ENCOUNTER — TELEPHONE (OUTPATIENT)
Dept: BEHAVIORAL/MENTAL HEALTH CLINIC | Age: 29
End: 2024-01-10

## 2024-01-10 NOTE — TELEPHONE ENCOUNTER
[facility] attempted to contact patient regarding appointment Friday 1/12. LVM for patient requesting call back at [phone].

## 2024-01-12 ENCOUNTER — OFFICE VISIT (OUTPATIENT)
Dept: BEHAVIORAL/MENTAL HEALTH CLINIC | Age: 29
End: 2024-01-12
Payer: COMMERCIAL

## 2024-01-12 DIAGNOSIS — F43.10 PTSD (POST-TRAUMATIC STRESS DISORDER): Primary | ICD-10-CM

## 2024-01-12 DIAGNOSIS — F41.9 ANXIETY: ICD-10-CM

## 2024-01-12 PROCEDURE — 90791 PSYCH DIAGNOSTIC EVALUATION: CPT | Performed by: SOCIAL WORKER

## 2024-01-12 NOTE — PROGRESS NOTES
ADULT BEHAVIORAL HEALTH ASSESSMENT  JAYNE Silverio  Licensed Independent        Visit Date: 1/12/2024   Time of appointment: 9:50 AM     Time spent with Patient: 26 minutes.   This is patient's first appointment.    Reason for Consult:  Anxiety and Depression     PCP:  Karla Philippe MD      Pt provided informed consent for the behavioral health program. Discussed with patient model of service to include the limits of confidentiality (i.e. abuse reporting, suicide intervention, etc.) and short-term intervention focused approach. Pt indicated understanding.     PRESENTING PROBLEM AND HISTORY  Masoud is a 28 y.o. female who presents for new evaluation and treatment of anxiety, depression.      Masoud reported \"I ignore things a lot, I grew up in a very emotional home\", she explained there was frequent drama and lying. Masoud reported she finds herself using avoidance as a coping mechanism, she notices struggling with anger, panic attacks, yelling, feeling over-stimulated, difficulty with attention/concentration, forgetfulness, losing things, placing items in a certain place because she will forget them if not, leaving projects unfinished, will tear the house apart and put it back together and/or moving rooms around, feeling down or depressed, feelings of worthlessness, low self-esteem, feeling down about self, excessive guilt (\"especially after I freak out\"), nightmares when she was younger in childhood, certain things popping up about her mom and dad that she then thinks the scenario may be real, flashbacks, broken sleep, amidst instances where she is also sleeping too much, fluctuations in weight gain/loss, forgetting to eat/decreased appetite. She reported she also finds there are times where she will drink 1-2 drinks for too many days in a row, and then intentionally stops herself due to fear of a substance abuse problem developing in the future. She reported she has never had a difficult

## 2024-02-05 ENCOUNTER — OFFICE VISIT (OUTPATIENT)
Dept: BARIATRICS/WEIGHT MGMT | Age: 29
End: 2024-02-05
Payer: COMMERCIAL

## 2024-02-05 VITALS
SYSTOLIC BLOOD PRESSURE: 124 MMHG | WEIGHT: 293 LBS | OXYGEN SATURATION: 96 % | HEIGHT: 64 IN | HEART RATE: 74 BPM | BODY MASS INDEX: 50.02 KG/M2 | DIASTOLIC BLOOD PRESSURE: 84 MMHG

## 2024-02-05 DIAGNOSIS — E28.2 PCOS (POLYCYSTIC OVARIAN SYNDROME): ICD-10-CM

## 2024-02-05 DIAGNOSIS — R73.03 PREDIABETES: ICD-10-CM

## 2024-02-05 DIAGNOSIS — E88.819 INSULIN RESISTANCE: ICD-10-CM

## 2024-02-05 DIAGNOSIS — E66.01 MORBID OBESITY WITH BMI OF 50.0-59.9, ADULT (HCC): Primary | ICD-10-CM

## 2024-02-05 PROCEDURE — 99214 OFFICE O/P EST MOD 30 MIN: CPT | Performed by: NURSE PRACTITIONER

## 2024-02-05 RX ORDER — PHENTERMINE HYDROCHLORIDE 37.5 MG/1
37.5 TABLET ORAL
Qty: 30 TABLET | Refills: 0 | Status: SHIPPED | OUTPATIENT
Start: 2024-02-05 | End: 2024-03-06

## 2024-02-05 ASSESSMENT — ENCOUNTER SYMPTOMS
CHEST TIGHTNESS: 0
SHORTNESS OF BREATH: 0
ABDOMINAL PAIN: 0
NAUSEA: 0
VOMITING: 0
WHEEZING: 0
COUGH: 0

## 2024-02-05 NOTE — PROGRESS NOTES
[facility] INVASIVE BARIATRIC SURG  [address]  [address]  [place] [id number]  Dept: [phone]    MEDICATION WEIGHT LOSS MANAGEMENT PROGRAM  PROGRESS NOTE     CC: Weight Loss    Patient: Masoud Soliman      Service Date: 2/5/2024  Visit:   2 month follow up    Medical Record #: [id number]  Current Visit Weight Information  Weight: (!) 136.5 kg (301 lb)   BMI: Body mass index is 51.67 kg/m².    Medication: none at this time due to breast feeding  Amount of weight loss in the last month: 0 lbs        History: 29 y.o. female who presents today for a 1 month follow up on weight loss. Weight loss medication was not initally started because she was breast feeding. She states she has been weaning the baby and last night was the last time she plans of breast feeding. She states he bite her 4 times and she is done.   Patient gained 1 pound in the last month.   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  She also started counseling for her depression, anxiety and PTSD.     A1C, cortisol total and ACTH levels were normal.    She continues on metformin for insulin resistances and PCOS. She is still tolerating well.     She has looked up options for weight loss. Zepbound requires step therapy. Tiburcio and Saxenda are on nationwide back order.   Semaglutide through [facility] isnt an affordable option for her.     Height: 1.626 m (5' 4\")  Highest Weight:   301 lbs      Review of Systems:     Review of Systems   Constitutional:  Negative for appetite change, chills, diaphoresis, fatigue and fever.        + obesity   Respiratory:  Negative for cough, chest tightness, shortness of breath and wheezing.    Cardiovascular:  Negative for chest pain, palpitations and leg swelling.   Gastrointestinal:  Negative for abdominal pain, nausea and vomiting.   Endocrine: Negative for polydipsia and polyuria.

## 2024-02-05 NOTE — PROGRESS NOTES
Medical Nutrition Therapy for Non-Surgical Weight Loss  Nutrition Follow-Up: Weight Loss Medication    Nutrition Assessment:  Patient is starting Adipex per Catie HUMPHREY for weight loss. Patient has gained  1 lb since last visit.   Patient's nutrition concerns include none   Reports prior to coming to appointments, would eat once in the morning and then a larger meal in the evening/night. Reports will always crave a sweet after dinner.  Patient is eating breakfast, lunch, dinner with 1 snack.  Patient is drinking at least 64 oz of water and sugar free beverages daily. Patient is typically drinking water, energy drinks, soda occasionally, juice rarely.   Patient reports doing a mini stair stepper to remain active, with a goal of exercising 4-5 times per week with a goal of doing more than the day before.  Discussed slowing down with eating, managing cravings, sugar free beverages.    24-hr diet recall scanned into chart.    Vitals:   Vitals:    02/05/24 1103   BP: 124/84   Pulse: 74   SpO2: 96%   Weight: (!) 136.5 kg (301 lb)   Height: 1.626 m (5' 4\")     Body mass index is 51.67 kg/m².    Estimated Energy Needs:  Calorie (MSJ x 1 AF - 500): 1500 kcals  Protein: 60-80 grams protein  Fluids: minimum 64 oz fluid    Nutrition Diagnosis:  Overweight/Obesity related to complex combination of decreased energy needs, disordered eating patterns, physical inactivity, and increased psychological/life stress as evidenced by Body mass index is 51.67 kg/m²..    Nutrition Intervention:  Diet: Low-Fat Diet, 60-80gm of protein, and 64oz of fluid    Nutrition Education: Nutrition Care Manual    Goal:   Eat a variety of fruits and vegetables, lean protein, whole grains and low-fat dairy.   Sip on water or sugar-free fluid throughout the day.   Aim for 3 meals and 1-2 snacks daily.    Discuss activity with physician and determine a plan for remaining active.    Monitor/Evaluate:  Diet tolerance, protein intake, fluid intake, frequency

## 2024-03-04 ENCOUNTER — OFFICE VISIT (OUTPATIENT)
Dept: BARIATRICS/WEIGHT MGMT | Age: 29
End: 2024-03-04
Payer: COMMERCIAL

## 2024-03-04 VITALS
OXYGEN SATURATION: 98 % | BODY MASS INDEX: 49 KG/M2 | HEART RATE: 78 BPM | WEIGHT: 287 LBS | HEIGHT: 64 IN | DIASTOLIC BLOOD PRESSURE: 84 MMHG | SYSTOLIC BLOOD PRESSURE: 118 MMHG

## 2024-03-04 DIAGNOSIS — R73.03 PREDIABETES: ICD-10-CM

## 2024-03-04 DIAGNOSIS — E28.2 PCOS (POLYCYSTIC OVARIAN SYNDROME): ICD-10-CM

## 2024-03-04 DIAGNOSIS — E88.819 INSULIN RESISTANCE: ICD-10-CM

## 2024-03-04 DIAGNOSIS — R42 DIZZINESS: ICD-10-CM

## 2024-03-04 DIAGNOSIS — E66.01 MORBID OBESITY WITH BMI OF 50.0-59.9, ADULT (HCC): Primary | ICD-10-CM

## 2024-03-04 PROCEDURE — 99214 OFFICE O/P EST MOD 30 MIN: CPT | Performed by: NURSE PRACTITIONER

## 2024-03-04 RX ORDER — PHENTERMINE HYDROCHLORIDE 37.5 MG/1
37.5 TABLET ORAL
Qty: 30 TABLET | Refills: 0 | Status: SHIPPED | OUTPATIENT
Start: 2024-03-04 | End: 2024-04-03

## 2024-03-04 ASSESSMENT — ENCOUNTER SYMPTOMS
ABDOMINAL PAIN: 0
VOMITING: 0
NAUSEA: 0
SHORTNESS OF BREATH: 0
COUGH: 0
CHEST TIGHTNESS: 0
WHEEZING: 0

## 2024-03-04 NOTE — PROGRESS NOTES
[facility] INVASIVE BARIATRIC SURG  [address]  [address]  [place] [id number]  Dept: [phone]    MEDICATION WEIGHT LOSS MANAGEMENT PROGRAM  PROGRESS NOTE     CC: Weight Loss    Patient: Masoud Soliman      Service Date: 3/4/2024  Visit:   3 month follow up on medications.     Medical Record #: [id number]  Current Visit Weight Information  Weight: 130.2 kg (287 lb)   BMI: Body mass index is 49.26 kg/m².        History: 29 y.o. female who presents today for a 1 month follow up on weight loss medication. She has been on metformin for 3 months, adipex for 1 month.    Medication: adipex and metformin  Amount of weight loss in the last month: 14 lbs  Amount of weight loss total: 14 lbs  Current dose: 37.5mg  Side Effects: dry mouth.    She has had some dizziness and tinnitus the last 2 weeks. She isnt sure if its the medication or if her syncope is acting up. She has a hx of neurocardiogenic syncope.  She hasn't had a full syncopal episode but if she is bending over a lot, going from sitting to standing quickly and a lot - symptoms worsen. She states she has noticed that eating saltier foods help.  She has started some sinus congestion lately.     Height: 1.626 m (5' 4\")  Highest Weight:   301 lbs      Exercising?   [x] Yes    [] No   Using stair stepper, very active at work now.  Review of Systems:     Review of Systems   Constitutional:  Negative for appetite change, chills, diaphoresis, fatigue and fever.        + obesity   HENT:  Positive for tinnitus.         + ear fullness   Respiratory:  Negative for cough, chest tightness, shortness of breath and wheezing.    Cardiovascular:  Negative for chest pain, palpitations and leg swelling.   Gastrointestinal:  Negative for abdominal pain, nausea and vomiting.   Skin:  Negative for pallor and rash.   Neurological:  Positive for dizziness. Negative for weakness, light-headedness and headaches.   All other  improved

## 2024-03-04 NOTE — PROGRESS NOTES
Medical Nutrition Therapy for Non-Surgical Weight Loss  Nutrition Follow-Up: Weight Loss Medication    Nutrition Assessment:  Patient is taking Adipex per Catie HUMPHREY for weight loss. Patient has lost 4 lbs since last visit.    Patient is eating 4 times per day.   Patient is drinking at least 64 oz of fluid and sugar free beverages. Patient is typically drinking water, juice.  Patient reports  stepper  to remain active.   Discussed consistency with meal times. Patient no longer craves sweets at night. Patient reports asking herself if she is hungry prior to eating. Discussed mindful eating.    24-hr diet recall scanned into chart.    Vitals:   Vitals:    03/04/24 1414   BP: 118/84   Pulse: 78   SpO2: 98%   Weight: 130.2 kg (287 lb)   Height: 1.626 m (5' 4\")     Body mass index is 49.26 kg/m².    Estimated Energy Needs:  Calorie (MSJ x 1 AF - 500): 1500 kcals   Protein: 60-80 grams protein  Fluids: minimum 64 oz fluid    Nutrition Diagnosis:  Overweight/Obesity related to complex combination of decreased energy needs, disordered eating patterns, physical inactivity, and increased psychological/life stress as evidenced by Body mass index is 49.26 kg/m²..    Nutrition Intervention:  Diet: Low-Fat Diet, 60-80gm of protein, and 64oz of fluid    Nutrition Education: Nutrition Care Manual    Goal:   Eat a variety of fruits and vegetables, lean protein, whole grains and low-fat dairy.   Sip on water or sugar-free fluid throughout the day.   Aim for 3 meals and 1-2 snacks daily.    Discuss activity with physician and determine a plan for remaining active.    Monitor/Evaluate:  Diet tolerance, protein intake, fluid intake, frequency of meals/snacks, activity, and follow-up monthly/prn.      Buster Bardales RD, LD  Clinical Bariatric Dietitian  [facility]  [phone]

## 2024-03-18 ENCOUNTER — OFFICE VISIT (OUTPATIENT)
Dept: FAMILY MEDICINE CLINIC | Age: 29
End: 2024-03-18
Payer: COMMERCIAL

## 2024-03-18 VITALS
SYSTOLIC BLOOD PRESSURE: 120 MMHG | DIASTOLIC BLOOD PRESSURE: 72 MMHG | OXYGEN SATURATION: 95 % | HEIGHT: 64 IN | WEIGHT: 283.8 LBS | HEART RATE: 94 BPM | BODY MASS INDEX: 48.45 KG/M2

## 2024-03-18 DIAGNOSIS — R55 POSTURAL DIZZINESS WITH PRESYNCOPE: Primary | ICD-10-CM

## 2024-03-18 DIAGNOSIS — J30.2 SEASONAL ALLERGIC RHINITIS, UNSPECIFIED TRIGGER: ICD-10-CM

## 2024-03-18 DIAGNOSIS — R42 POSTURAL DIZZINESS WITH PRESYNCOPE: Primary | ICD-10-CM

## 2024-03-18 PROCEDURE — 99214 OFFICE O/P EST MOD 30 MIN: CPT | Performed by: NURSE PRACTITIONER

## 2024-03-18 SDOH — ECONOMIC STABILITY: FOOD INSECURITY: WITHIN THE PAST 12 MONTHS, THE FOOD YOU BOUGHT JUST DIDN'T LAST AND YOU DIDN'T HAVE MONEY TO GET MORE.: NEVER TRUE

## 2024-03-18 SDOH — ECONOMIC STABILITY: INCOME INSECURITY: HOW HARD IS IT FOR YOU TO PAY FOR THE VERY BASICS LIKE FOOD, HOUSING, MEDICAL CARE, AND HEATING?: NOT HARD AT ALL

## 2024-03-18 SDOH — ECONOMIC STABILITY: FOOD INSECURITY: WITHIN THE PAST 12 MONTHS, YOU WORRIED THAT YOUR FOOD WOULD RUN OUT BEFORE YOU GOT MONEY TO BUY MORE.: NEVER TRUE

## 2024-03-18 ASSESSMENT — PATIENT HEALTH QUESTIONNAIRE - PHQ9
9. THOUGHTS THAT YOU WOULD BE BETTER OFF DEAD, OR OF HURTING YOURSELF: NOT AT ALL
6. FEELING BAD ABOUT YOURSELF - OR THAT YOU ARE A FAILURE OR HAVE LET YOURSELF OR YOUR FAMILY DOWN: NOT AT ALL
SUM OF ALL RESPONSES TO PHQ QUESTIONS 1-9: 0
SUM OF ALL RESPONSES TO PHQ QUESTIONS 1-9: 0
3. TROUBLE FALLING OR STAYING ASLEEP: NOT AT ALL
8. MOVING OR SPEAKING SO SLOWLY THAT OTHER PEOPLE COULD HAVE NOTICED. OR THE OPPOSITE, BEING SO FIGETY OR RESTLESS THAT YOU HAVE BEEN MOVING AROUND A LOT MORE THAN USUAL: NOT AT ALL
1. LITTLE INTEREST OR PLEASURE IN DOING THINGS: NOT AT ALL
SUM OF ALL RESPONSES TO PHQ QUESTIONS 1-9: 0
5. POOR APPETITE OR OVEREATING: NOT AT ALL
2. FEELING DOWN, DEPRESSED OR HOPELESS: NOT AT ALL
7. TROUBLE CONCENTRATING ON THINGS, SUCH AS READING THE NEWSPAPER OR WATCHING TELEVISION: NOT AT ALL
SUM OF ALL RESPONSES TO PHQ QUESTIONS 1-9: 0
SUM OF ALL RESPONSES TO PHQ9 QUESTIONS 1 & 2: 0
4. FEELING TIRED OR HAVING LITTLE ENERGY: NOT AT ALL
10. IF YOU CHECKED OFF ANY PROBLEMS, HOW DIFFICULT HAVE THESE PROBLEMS MADE IT FOR YOU TO DO YOUR WORK, TAKE CARE OF THINGS AT HOME, OR GET ALONG WITH OTHER PEOPLE: NOT DIFFICULT AT ALL

## 2024-03-18 ASSESSMENT — ENCOUNTER SYMPTOMS
VOMITING: 0
BLOOD IN STOOL: 0
CONSTIPATION: 0
DIARRHEA: 0
COUGH: 0
WHEEZING: 0
NAUSEA: 0
CHEST TIGHTNESS: 0
ABDOMINAL PAIN: 0
SHORTNESS OF BREATH: 0
ABDOMINAL DISTENTION: 0

## 2024-03-18 NOTE — PROGRESS NOTES
Masoud Soliman (:  1995) is a 29 y.o. female,Established patient, here for evaluation of the following chief complaint(s): Discuss Medications, Tinnitus (Hears white noise all the time /Has ringing in her ears /Doesn't know if its related to her syncope ), Loss of Consciousness, Knee Pain (Left knee been going on for about 2 months ), and Blurred Vision (Both eyes /Left eye is worse /But right eye is starting to get worse )      ASSESSMENT/PLAN:    Masoud received counseling on the following healthy behaviors: nutrition, exercise, and medication adherence  Reviewed prior labs and health maintenance  Discussed use, benefit, and side effects of prescribed medications.   Barriers to medication compliance addressed.   Patient given educational materials - see patient instructions  All patient questions answered.  Patient voiced understanding.  The patient's past medical,surgical, social, and family history as well as her current medications and allergies were reviewed as documented in today's encounter.    Medications, labs, diagnostic studies, consultations and follow-up as documented in this encounter.    Masoud was seen today for discuss medications, tinnitus, loss of consciousness, knee pain and blurred vision.    Diagnoses and all orders for this visit:    Postural dizziness with presyncope  -Patient states that the dizziness is acting up as of recently  -Increase salt in diet  -Compression stockings recommended  -See note below  -Follow with cardiology    Seasonal allergic rhinitis, unspecified trigger  -Worsening this time of year  -Plan to continue current treatment and add antihistamine into the regimen as well  -See note below    Body mass index (BMI) 45.0-49.9, adult (HCC)  -Improving  -Continue following with weight management  -Continue current regimen for now    Prior to Visit Medications    Medication Sig Taking? Authorizing Provider   phentermine (ADIPEX-P) 37.5 MG tablet Take 1 tablet by

## 2024-03-18 NOTE — PROGRESS NOTES
Visit Information    Have you changed or started any medications since your last visit including any over-the-counter medicines, vitamins, or herbal medicines? no   Have you stopped taking any of your medications? Is so, why? -  no  Are you having any side effects from any of your medications? - no    Have you seen any other physician or provider since your last visit?  no   Have you had any other diagnostic tests since your last visit?  no   Have you been seen in the emergency room and/or had an admission in a hospital since we last saw you?  no   Have you had your routine dental cleaning in the past 6 months?  yes -      Do you have an active MyChart account? If no, what is the barrier?  Yes    Patient Care Team:  Karla Philippe MD as PCP - General (Family Medicine)  Karla Philippe MD as PCP - Empaneled Provider  Jt Phillip MD as Obstetrician (Perinatology)  Daniel Serna DO as Consulting Physician (Obstetrics & Gynecology)  Domenica Odonnell MD as Consulting Physician (Cardiology)  Madison Chicas PA as Physician Assistant (Neurology)    Medical History Review  Past Medical, Family, and Social History reviewed and does contribute to the patient presenting condition    Health Maintenance   Topic Date Due    Hepatitis B vaccine (1 of 3 - 3-dose series) Never done    COVID-19 Vaccine (1) 11/01/2024 (Originally 1995)    Flu vaccine (1) 11/13/2024 (Originally 8/1/2023)    Depression Monitoring  12/04/2024    A1C test (Diabetic or Prediabetic)  01/05/2025    Cervical cancer screen  12/05/2025    DTaP/Tdap/Td vaccine (2 - Td or Tdap) 05/19/2033    Meningococcal (ACWY) vaccine  Completed    Hepatitis C screen  Completed    HIV screen  Completed    Hepatitis A vaccine  Aged Out    Hib vaccine  Aged Out    HPV vaccine  Aged Out    Polio vaccine  Aged Out    Pneumococcal 0-64 years Vaccine  Aged Out    Varicella vaccine  Discontinued    Depression Screen  Discontinued

## 2024-04-01 ENCOUNTER — OFFICE VISIT (OUTPATIENT)
Dept: BARIATRICS/WEIGHT MGMT | Age: 29
End: 2024-04-01
Payer: COMMERCIAL

## 2024-04-01 VITALS
DIASTOLIC BLOOD PRESSURE: 82 MMHG | WEIGHT: 279 LBS | HEIGHT: 64 IN | RESPIRATION RATE: 18 BRPM | HEART RATE: 76 BPM | BODY MASS INDEX: 47.63 KG/M2 | SYSTOLIC BLOOD PRESSURE: 128 MMHG

## 2024-04-01 DIAGNOSIS — E28.2 PCOS (POLYCYSTIC OVARIAN SYNDROME): ICD-10-CM

## 2024-04-01 DIAGNOSIS — E88.819 INSULIN RESISTANCE: ICD-10-CM

## 2024-04-01 DIAGNOSIS — E66.01 MORBID OBESITY WITH BMI OF 50.0-59.9, ADULT (HCC): ICD-10-CM

## 2024-04-01 DIAGNOSIS — R73.03 PREDIABETES: ICD-10-CM

## 2024-04-01 PROCEDURE — 99213 OFFICE O/P EST LOW 20 MIN: CPT | Performed by: NURSE PRACTITIONER

## 2024-04-01 RX ORDER — PHENTERMINE HYDROCHLORIDE 37.5 MG/1
37.5 TABLET ORAL
Qty: 30 TABLET | Refills: 0 | Status: SHIPPED | OUTPATIENT
Start: 2024-04-01 | End: 2024-05-01

## 2024-04-01 ASSESSMENT — ENCOUNTER SYMPTOMS
COUGH: 0
CHEST TIGHTNESS: 0
SHORTNESS OF BREATH: 0
VOMITING: 0
ABDOMINAL PAIN: 0
NAUSEA: 0
WHEEZING: 0

## 2024-04-01 NOTE — PROGRESS NOTES
Medical Nutrition Therapy for Non-Surgical Weight Loss  Nutrition Follow-Up: Weight Loss Medication    Nutrition Assessment:  Patient is taking Adipx per Catie HUMPHREY for weight loss. Patient has lost 4 lbs since last visit.   Patient's nutrition concerns include not being able to eat enough and trying to find time to eat.  Patient is eating 4 times per day.   Patient is drinking at least 64 oz of fluid and sugar free beverages.  Patient reports not adding physical activity to daily life to remain active.   Discussed getting back to meal prepping, discussed zay boxes, grab and go breakfast, easy snacks, etc.    24-hr diet recall scanned into chart.    Vitals:   Vitals:    04/01/24 0914   BP: 128/82   Site: Left Upper Arm   Position: Sitting   Cuff Size: Large Adult   Pulse: 76   Resp: 18   Weight: 126.6 kg (279 lb)   Height: 1.626 m (5' 4\")      Body mass index is 47.89 kg/m².    Estimated Energy Needs:  Calorie (MSJ x1 AF - 500): 1500 kcals  Protein: 60-80 grams protein  Fluids: minimum 64 oz fluid    Nutrition Diagnosis:  Overweight/Obesity related to complex combination of decreased energy needs, disordered eating patterns, physical inactivity, and increased psychological/life stress as evidenced by Body mass index is 47.89 kg/m²..    Nutrition Intervention:  Diet: Low-Fat Diet, 60-80gm of protein, and 64oz of fluid    Nutrition Education: Nutrition Care Manual    Goal:   Eat a variety of fruits and vegetables, lean protein, whole grains and low-fat dairy.   Sip on water or sugar-free fluid throughout the day.   Aim for 3 meals and 1-2 snacks daily.    Discuss activity with physician and determine a plan for remaining active.    Monitor/Evaluate:  Diet tolerance, protein intake, fluid intake, frequency of meals/snacks, activity, and follow-up monthly/prn.      Buster Bardales RD, LD  Clinical Bariatric Dietitian  [facility]  [phone]

## 2024-04-01 NOTE — PROGRESS NOTES
[facility] INVASIVE BARIATRIC SURG  [address]  [address]  [place] [id number]  Dept: [phone]    MEDICATION WEIGHT LOSS MANAGEMENT PROGRAM  PROGRESS NOTE     CC: Weight Loss    Patient: Masoud Soliman      Service Date: 4/1/2024  Visit:   4 month follow up on medications.     Medical Record #: [id number]  Current Visit Weight Information  Weight: 126.6 kg (279 lb)   BMI: Body mass index is 47.89 kg/m².        History: 29 y.o. female who presents today for a 1 month follow up on weight loss medication. She has been on metformin for 4 months, adipex for 2 month.    Medication: adipex and metformin  Amount of weight loss in the last month: 8 lbs  Amount of weight loss total: 22 lbs  Current dose: 37.5mg  Side Effects: dry mouth and she states she has felt like she is more irritable lately.     She isnt sure if it primarily the medication causing the irritably. She states she has some behavioral issues with her oldest son and medical issues with her baby so she has been stressed. 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   She would like to continue the adipex for 1 more month to see how her mood does. She is hoping with the weather starting to get nicer and she is able to be outside more, her mood will improve.   She has noticed improvement with her knee pain.     The dizziness she was having before has improved. She states that she has been using zytrec and OTC nasal sprays. She saw her PCP and she was told that she had a lot of fluid in her ear.     Height: 1.626 m (5' 4\")  Highest Weight:   301 lbs      Exercising?   [x] Yes    [] No   Using stair stepper, very active at work now.  Review of Systems:     Review of Systems   Constitutional:  Negative for appetite change, chills, diaphoresis, fatigue and fever.        + obesity   HENT:          + ear fullness   Respiratory:  Negative for cough, chest tightness, shortness of

## 2024-05-03 ENCOUNTER — OFFICE VISIT (OUTPATIENT)
Age: 29
End: 2024-05-03
Payer: COMMERCIAL

## 2024-05-03 VITALS
RESPIRATION RATE: 20 BRPM | TEMPERATURE: 98 F | OXYGEN SATURATION: 98 % | SYSTOLIC BLOOD PRESSURE: 125 MMHG | HEART RATE: 97 BPM | WEIGHT: 267 LBS | DIASTOLIC BLOOD PRESSURE: 86 MMHG | BODY MASS INDEX: 45.83 KG/M2

## 2024-05-03 DIAGNOSIS — E66.01 MORBID OBESITY WITH BMI OF 50.0-59.9, ADULT (HCC): ICD-10-CM

## 2024-05-03 DIAGNOSIS — E88.819 INSULIN RESISTANCE: Primary | ICD-10-CM

## 2024-05-03 DIAGNOSIS — R73.03 PREDIABETES: ICD-10-CM

## 2024-05-03 DIAGNOSIS — E28.2 PCOS (POLYCYSTIC OVARIAN SYNDROME): ICD-10-CM

## 2024-05-03 PROCEDURE — 99214 OFFICE O/P EST MOD 30 MIN: CPT | Performed by: NURSE PRACTITIONER

## 2024-05-03 RX ORDER — PHENTERMINE HYDROCHLORIDE 37.5 MG/1
37.5 TABLET ORAL
Qty: 30 TABLET | Refills: 1 | Status: SHIPPED | OUTPATIENT
Start: 2024-05-03 | End: 2024-06-02

## 2024-05-03 ASSESSMENT — ENCOUNTER SYMPTOMS
WHEEZING: 0
VOMITING: 0
COUGH: 0
SHORTNESS OF BREATH: 0
NAUSEA: 0
ABDOMINAL PAIN: 0
CHEST TIGHTNESS: 0

## 2024-05-03 NOTE — PROGRESS NOTES
[facility] INVASIVE BARIATRIC SURGERY  [address] [place] [address]  Dept: [phone]    MEDICATION WEIGHT LOSS MANAGEMENT PROGRAM  PROGRESS NOTE     CC: Weight Loss    Patient: Masoud Soliman      Service Date: 5/3/2024  Visit:   4 month follow up on medications.     Medical Record #: [id number]  Current Visit Weight Information  Weight: 121.1 kg (267 lb)   BMI: Body mass index is 45.83 kg/m².        History: 29 y.o. female who presents today for a 1 month follow up on weight loss medication. She has been on metformin for 5 months, adipex for 3 month.    Medication: adipex and metformin  Amount of weight loss in the last month: 12 lbs  Amount of weight loss total: 34 lbs  Current dose: 37.5mg  Side Effects: dry mouth and she states she has felt like she is more irritable lately.     She still has irritability but she is managing it okay. She states she has a lot of stress. She states when she was really depressed previously, she let a lot of things go and now she is struggling with some consequences from that which she is handling. She cannot say for sure that the adipex is causing the irritability.    Her blood pressure is elevated today but she admits that she had a panic attack this morning. She has an abscess tooth and she has been having a lot of pain on top of all her stress. She is on an abx right now.     Diet recall looks ok. She is still working on increasing protein.      Height:   5 ft 4 in  Highest Weight:   301 lbs      Exercising?   [x] Yes    [] No   Using stair stepper, very active at work now.  Review of Systems:     Review of Systems   Constitutional:  Negative for appetite change, chills, diaphoresis, fatigue and fever.        + obesity   Respiratory:  Negative for cough, chest tightness, shortness of breath and wheezing.    Cardiovascular:  Negative for chest pain, palpitations and leg swelling.   Gastrointestinal:

## 2024-05-29 ENCOUNTER — TELEPHONE (OUTPATIENT)
Dept: BEHAVIORAL/MENTAL HEALTH CLINIC | Age: 29
End: 2024-05-29

## 2024-05-29 NOTE — TELEPHONE ENCOUNTER
Patient calls in today and describes she has rash now all over her body,  She has an appointment with us on 6/10. Now states she is having pain on these spots and look infected. She also states she has a cut on her finger and it also is infected.  She is feeling states she is feel ill since infections have broken out.  Advised patient to be evaluated at walk in or er.

## 2024-05-31 DIAGNOSIS — E28.2 PCOS (POLYCYSTIC OVARIAN SYNDROME): ICD-10-CM

## 2024-05-31 DIAGNOSIS — R73.03 PREDIABETES: ICD-10-CM

## 2024-05-31 DIAGNOSIS — E88.819 INSULIN RESISTANCE: ICD-10-CM

## 2024-06-03 RX ORDER — METFORMIN HYDROCHLORIDE 500 MG/1
500 TABLET, EXTENDED RELEASE ORAL DAILY
Qty: 90 TABLET | Refills: 1 | Status: SHIPPED | OUTPATIENT
Start: 2024-06-03

## 2024-06-10 ENCOUNTER — OFFICE VISIT (OUTPATIENT)
Dept: FAMILY MEDICINE CLINIC | Age: 29
End: 2024-06-10
Payer: COMMERCIAL

## 2024-06-10 VITALS
SYSTOLIC BLOOD PRESSURE: 128 MMHG | DIASTOLIC BLOOD PRESSURE: 80 MMHG | TEMPERATURE: 96.8 F | BODY MASS INDEX: 43.17 KG/M2 | OXYGEN SATURATION: 97 % | HEART RATE: 103 BPM | HEIGHT: 64 IN | WEIGHT: 252.9 LBS

## 2024-06-10 DIAGNOSIS — L40.9 PSORIASIS, UNSPECIFIED: Primary | ICD-10-CM

## 2024-06-10 DIAGNOSIS — R53.83 FATIGUE, UNSPECIFIED TYPE: ICD-10-CM

## 2024-06-10 DIAGNOSIS — R73.9 HYPERGLYCEMIA: ICD-10-CM

## 2024-06-10 DIAGNOSIS — R55 POSTURAL DIZZINESS WITH PRESYNCOPE: ICD-10-CM

## 2024-06-10 DIAGNOSIS — G43.909 MIGRAINE WITHOUT STATUS MIGRAINOSUS, NOT INTRACTABLE, UNSPECIFIED MIGRAINE TYPE: ICD-10-CM

## 2024-06-10 DIAGNOSIS — L50.9 URTICARIA: ICD-10-CM

## 2024-06-10 DIAGNOSIS — R42 POSTURAL DIZZINESS WITH PRESYNCOPE: ICD-10-CM

## 2024-06-10 DIAGNOSIS — E78.1 HYPERTRIGLYCERIDEMIA: ICD-10-CM

## 2024-06-10 PROCEDURE — 99214 OFFICE O/P EST MOD 30 MIN: CPT | Performed by: NURSE PRACTITIONER

## 2024-06-10 RX ORDER — IBUPROFEN 800 MG/1
800 TABLET ORAL EVERY 6 HOURS PRN
COMMUNITY
Start: 2024-04-29

## 2024-06-10 RX ORDER — FAMOTIDINE 20 MG/1
20 TABLET, FILM COATED ORAL 2 TIMES DAILY
COMMUNITY
Start: 2024-05-30

## 2024-06-10 RX ORDER — AMOXICILLIN AND CLAVULANATE POTASSIUM 875; 125 MG/1; MG/1
1 TABLET, FILM COATED ORAL 2 TIMES DAILY
COMMUNITY
Start: 2024-04-29

## 2024-06-10 RX ORDER — CEPHALEXIN 500 MG/1
500 CAPSULE ORAL 2 TIMES DAILY
COMMUNITY
Start: 2024-05-30

## 2024-06-10 RX ORDER — TRIAMCINOLONE ACETONIDE 1 MG/G
OINTMENT TOPICAL
Qty: 80 G | Refills: 0 | Status: SHIPPED | OUTPATIENT
Start: 2024-06-10

## 2024-06-10 SDOH — ECONOMIC STABILITY: FOOD INSECURITY: WITHIN THE PAST 12 MONTHS, YOU WORRIED THAT YOUR FOOD WOULD RUN OUT BEFORE YOU GOT MONEY TO BUY MORE.: NEVER TRUE

## 2024-06-10 SDOH — ECONOMIC STABILITY: INCOME INSECURITY: HOW HARD IS IT FOR YOU TO PAY FOR THE VERY BASICS LIKE FOOD, HOUSING, MEDICAL CARE, AND HEATING?: NOT HARD AT ALL

## 2024-06-10 SDOH — ECONOMIC STABILITY: FOOD INSECURITY: WITHIN THE PAST 12 MONTHS, THE FOOD YOU BOUGHT JUST DIDN'T LAST AND YOU DIDN'T HAVE MONEY TO GET MORE.: NEVER TRUE

## 2024-06-10 ASSESSMENT — PATIENT HEALTH QUESTIONNAIRE - PHQ9
1. LITTLE INTEREST OR PLEASURE IN DOING THINGS: NOT AT ALL
3. TROUBLE FALLING OR STAYING ASLEEP: NOT AT ALL
2. FEELING DOWN, DEPRESSED OR HOPELESS: NOT AT ALL
SUM OF ALL RESPONSES TO PHQ9 QUESTIONS 1 & 2: 0
10. IF YOU CHECKED OFF ANY PROBLEMS, HOW DIFFICULT HAVE THESE PROBLEMS MADE IT FOR YOU TO DO YOUR WORK, TAKE CARE OF THINGS AT HOME, OR GET ALONG WITH OTHER PEOPLE: NOT DIFFICULT AT ALL
5. POOR APPETITE OR OVEREATING: NOT AT ALL
SUM OF ALL RESPONSES TO PHQ QUESTIONS 1-9: 0
8. MOVING OR SPEAKING SO SLOWLY THAT OTHER PEOPLE COULD HAVE NOTICED. OR THE OPPOSITE, BEING SO FIGETY OR RESTLESS THAT YOU HAVE BEEN MOVING AROUND A LOT MORE THAN USUAL: NOT AT ALL
9. THOUGHTS THAT YOU WOULD BE BETTER OFF DEAD, OR OF HURTING YOURSELF: NOT AT ALL
7. TROUBLE CONCENTRATING ON THINGS, SUCH AS READING THE NEWSPAPER OR WATCHING TELEVISION: NOT AT ALL
6. FEELING BAD ABOUT YOURSELF - OR THAT YOU ARE A FAILURE OR HAVE LET YOURSELF OR YOUR FAMILY DOWN: NOT AT ALL
SUM OF ALL RESPONSES TO PHQ QUESTIONS 1-9: 0
SUM OF ALL RESPONSES TO PHQ QUESTIONS 1-9: 0
4. FEELING TIRED OR HAVING LITTLE ENERGY: NOT AT ALL
SUM OF ALL RESPONSES TO PHQ QUESTIONS 1-9: 0

## 2024-06-10 ASSESSMENT — ENCOUNTER SYMPTOMS
NAUSEA: 0
BACK PAIN: 0
CHEST TIGHTNESS: 0
DIARRHEA: 0
BLOOD IN STOOL: 0
SHORTNESS OF BREATH: 0
COUGH: 0
CONSTIPATION: 0
VOMITING: 0
WHEEZING: 0
ABDOMINAL PAIN: 0
ABDOMINAL DISTENTION: 0

## 2024-06-10 NOTE — PROGRESS NOTES
Visit Information    Have you changed or started any medications since your last visit including any over-the-counter medicines, vitamins, or herbal medicines? no   Have you stopped taking any of your medications? Is so, why? -  no  Are you having any side effects from any of your medications? - no    Have you seen any other physician or provider since your last visit?  no   Have you had any other diagnostic tests since your last visit?  no   Have you been seen in the emergency room and/or had an admission in a hospital since we last saw you?  Yes   Have you had your routine dental cleaning in the past 6 months?  no     Do you have an active MyChart account? If no, what is the barrier?  Yes    Patient Care Team:  Karla Philippe MD as PCP - General (Family Medicine)  Karla Philippe MD as PCP - Empaneled Provider  Jt Phillip MD as Obstetrician (Perinatology)  Daniel Serna DO as Consulting Physician (Obstetrics & Gynecology)  Domenica Odonnell MD as Consulting Physician (Cardiology)  Madison Chicas PA as Physician Assistant (Neurology)    Medical History Review  Past Medical, Family, and Social History reviewed and does contribute to the patient presenting condition    Health Maintenance   Topic Date Due    Hepatitis B vaccine (1 of 3 - 3-dose series) Never done    COVID-19 Vaccine (1) 11/01/2024 (Originally 1995)    Flu vaccine (Season Ended) 11/13/2024 (Originally 8/1/2024)    A1C test (Diabetic or Prediabetic)  01/05/2025    Depression Monitoring  03/18/2025    Cervical cancer screen  12/05/2025    DTaP/Tdap/Td vaccine (2 - Td or Tdap) 05/19/2033    Meningococcal (ACWY) vaccine  Completed    Hepatitis C screen  Completed    HIV screen  Completed    Hepatitis A vaccine  Aged Out    Hib vaccine  Aged Out    HPV vaccine  Aged Out    Polio vaccine  Aged Out    Pneumococcal 0-64 years Vaccine  Aged Out    Varicella vaccine  Discontinued    Depression Screen  Discontinued             
for: \"FOLATE\"  Lab Results   Component Value Date    VITD25 41.1 06/13/2022         Return in about 3 months (around 9/18/2024) for with Dr Philippe .      This note was completed by using the assistance of a speech-recognition program. However, inadvertent computerized transcription errors may be present. Although every effort was made to ensure accuracy, no guarantees can be provided that every mistake has been identified and corrected by editing.      An electronic signature was used to authenticate this note.  Electronically signed by BRIANNE Moraes CNP on 6/10/2024 at 4:57 PM

## 2024-07-07 ENCOUNTER — TELEPHONE (OUTPATIENT)
Dept: FAMILY MEDICINE CLINIC | Age: 29
End: 2024-07-07

## 2024-07-15 ENCOUNTER — OFFICE VISIT (OUTPATIENT)
Dept: BARIATRICS/WEIGHT MGMT | Age: 29
End: 2024-07-15
Payer: COMMERCIAL

## 2024-07-15 VITALS
BODY MASS INDEX: 40.97 KG/M2 | HEART RATE: 82 BPM | OXYGEN SATURATION: 98 % | DIASTOLIC BLOOD PRESSURE: 84 MMHG | SYSTOLIC BLOOD PRESSURE: 120 MMHG | WEIGHT: 240 LBS | HEIGHT: 64 IN

## 2024-07-15 DIAGNOSIS — E66.01 MORBID OBESITY WITH BMI OF 40.0-44.9, ADULT (HCC): ICD-10-CM

## 2024-07-15 DIAGNOSIS — R73.03 PREDIABETES: Primary | ICD-10-CM

## 2024-07-15 DIAGNOSIS — E28.2 PCOS (POLYCYSTIC OVARIAN SYNDROME): ICD-10-CM

## 2024-07-15 DIAGNOSIS — E88.819 INSULIN RESISTANCE: ICD-10-CM

## 2024-07-15 PROCEDURE — 99214 OFFICE O/P EST MOD 30 MIN: CPT | Performed by: NURSE PRACTITIONER

## 2024-07-15 ASSESSMENT — ENCOUNTER SYMPTOMS
CHEST TIGHTNESS: 0
WHEEZING: 0
NAUSEA: 0
COUGH: 0
ABDOMINAL PAIN: 0
SHORTNESS OF BREATH: 0
VOMITING: 0

## 2024-07-15 NOTE — PROGRESS NOTES
[facility] INVASIVE BARIATRIC SURG  [address]  [address]  [place] [id number]  Dept: [phone]    MEDICATION WEIGHT LOSS MANAGEMENT PROGRAM  PROGRESS NOTE     CC: Weight Loss    Patient: Masoud Soliman      Service Date: 7/15/2024  Visit:   7 month follow up on medications.     Medical Record #: [id number]  Current Visit Weight Information  Weight: 108.9 kg (240 lb)   BMI: Body mass index is 41.2 kg/m².        History: 29 y.o. female who presents today for a 1 month follow up on weight loss medication. She has been on metformin for 5 months, adipex for 7 month.    Medication: adipex and metformin  Amount of weight loss in the last month: 27 lbs  Amount of weight loss total: 51 lbs  Current dose: 37.5mg  Side Effects: dry mouth and she states she has felt like she is more irritable lately.     Diet recall looks ok. She is still working on increasing protein.    Patient states she has been sick on and off over the last 2 months.   She states last month she skipped multiple days of her medication on and off because she was having some postural dizziness and multiple infections. She wasn't able to do a lot of movement without being dizzy. She has also had a paronychia, thigh abscess and UTI.      With the abscess, she was down for over a week because she wasn't able to do a lot of walking. She states last week was hard for her because the antibiotics were causing nausea and vomiting.     Labs reviewed from May 30th - stable except renal insuffiencey r/t her UTI. Labs rechecked on 7/7 and renal function as back to normal. She has labs in the system from PCP to get completed also.    Height: 1.626 m (5' 4\") 5 ft 4 in  Highest Weight:   301 lbs      Exercising?   [] Yes    [x] No     Review of Systems:     Review of Systems   Constitutional:  Negative for appetite change, chills, diaphoresis, fatigue and fever.        + obesity   Respiratory:  Negative for 
activity with physician and determine a plan for remaining active.    Monitor/Evaluate:  Diet tolerance, protein intake, fluid intake, frequency of meals/snacks, activity, and follow-up monthly/prn.      Melani Pepe MS, RD, LD  Clinical Dietitian  [facility]  [phone]

## 2024-07-30 ENCOUNTER — TELEMEDICINE (OUTPATIENT)
Dept: FAMILY MEDICINE CLINIC | Age: 29
End: 2024-07-30
Payer: COMMERCIAL

## 2024-07-30 DIAGNOSIS — R55 POSTURAL DIZZINESS WITH PRESYNCOPE: Primary | ICD-10-CM

## 2024-07-30 DIAGNOSIS — L02.416 ABSCESS OF LEFT LEG: ICD-10-CM

## 2024-07-30 DIAGNOSIS — G43.809 OTHER MIGRAINE WITHOUT STATUS MIGRAINOSUS, NOT INTRACTABLE: ICD-10-CM

## 2024-07-30 DIAGNOSIS — R42 POSTURAL DIZZINESS WITH PRESYNCOPE: Primary | ICD-10-CM

## 2024-07-30 PROCEDURE — 99213 OFFICE O/P EST LOW 20 MIN: CPT | Performed by: NURSE PRACTITIONER

## 2024-07-30 RX ORDER — BLOOD PRESSURE TEST KIT
KIT MISCELLANEOUS
Qty: 1 KIT | Refills: 0 | Status: SHIPPED | OUTPATIENT
Start: 2024-07-30

## 2024-07-30 ASSESSMENT — ENCOUNTER SYMPTOMS
SHORTNESS OF BREATH: 0
BACK PAIN: 0
VOMITING: 0
NAUSEA: 0
BLOOD IN STOOL: 0
CONSTIPATION: 0
DIARRHEA: 0
CHEST TIGHTNESS: 0
COUGH: 0
ABDOMINAL PAIN: 0
ABDOMINAL DISTENTION: 0
WHEEZING: 0

## 2024-07-30 NOTE — PROGRESS NOTES
2024    TELEHEALTH EVALUATION -- Audio/Visual    Masoud Soliman (:  1995) is a 29 y.o. female,Established patient, here for evaluation of the following chief complaint(s): Anxiety and Skin Problem (abscess)    Patient is here today for follow-up.  She recently in the emergency department on  with an abscess on the left inner posterior thigh.  That is healing.  On my exam today, it is scabbing over.  She states there is some fluid that still leaks from it, but is clear.  No surrounding cellulitic infection noted.  She denies any fever chills or aches.  When she was in the emergency department, she states that a drained it.  She does have a history of psoriasis, and I did previously place referral to dermatology, she states she is going to establish with them.    She tells me that she recently stopped taking the Florinef.  She was sick, so she was not able to take any of her medication.  She states actually ever since stopping the medication, she is feeling better, her dizziness is much improved.  She is going to speak with cardiology in regards to this.    She still has some outstanding lab work that needs to be completed, she states she will work on getting this done.    I am can order a blood pressure cuff for her at home, so she can keep track.  She is to let us know with any episodes of hypertension or hypotension.    She has an upcoming appointment in our office again in September, educated to keep this appointment as scheduled.  Continue to follow with specialist as scheduled.    ASSESSMENT/PLAN:    1. Postural dizziness with presyncope  -No recent issues of syncope or dizziness  -Recently stopped taking the Florinef, and states her symptoms improved  -Plans to touch base with cardiology  -Blood pressure kit ordered for monitoring at home  -     Blood Pressure KIT; Disp-1 kit, R-0, PrintDiagnosis: HTN. Needs to check blood pressure 1-2 times a day until stable, then once a day. Goal blood

## 2024-08-20 ENCOUNTER — HOSPITAL ENCOUNTER (OUTPATIENT)
Age: 29
Discharge: HOME | End: 2024-08-20
Payer: COMMERCIAL

## 2024-08-20 DIAGNOSIS — R53.83 FATIGUE, UNSPECIFIED TYPE: ICD-10-CM

## 2024-08-20 DIAGNOSIS — R73.9 HYPERGLYCEMIA: ICD-10-CM

## 2024-08-20 LAB
25(OH)D3 SERPL-MCNC: 41.3 NG/ML (ref 30–100)
ALBUMIN SERPL-MCNC: 4.3 G/DL (ref 3.5–5.2)
ALP SERPL-CCNC: 81 U/L (ref 35–104)
ALT SERPL-CCNC: 14 U/L (ref 5–33)
ANION GAP SERPL CALCULATED.3IONS-SCNC: 12 MMOL/L (ref 9–17)
AST SERPL-CCNC: 17 U/L
BILIRUB SERPL-MCNC: 0.4 MG/DL (ref 0.3–1.2)
BILIRUB UR QL STRIP: NEGATIVE
BUN SERPL-MCNC: 12 MG/DL (ref 6–20)
CALCIUM SERPL-MCNC: 9.4 MG/DL (ref 8.6–10.4)
CHLORIDE SERPL-SCNC: 102 MMOL/L (ref 98–107)
CLARITY UR: CLEAR
CO2 SERPL-SCNC: 24 MMOL/L (ref 20–31)
COLOR UR: YELLOW
COMMENT: ABNORMAL
CREAT SERPL-MCNC: 1 MG/DL (ref 0.5–0.9)
CRP SERPL HS-MCNC: 11 MG/L (ref 0–5)
ERYTHROCYTE [DISTWIDTH] IN BLOOD BY AUTOMATED COUNT: 15 % (ref 11.5–14.9)
ERYTHROCYTE [SEDIMENTATION RATE] IN BLOOD BY PHOTOMETRIC METHOD: 25 MM/HR (ref 0–20)
FERRITIN SERPL-MCNC: 20 NG/ML (ref 13–150)
FOLATE SERPL-MCNC: 13.3 NG/ML (ref 4.8–24.2)
GFR, ESTIMATED: 78 ML/MIN/1.73M2
GLUCOSE SERPL-MCNC: 93 MG/DL (ref 70–99)
GLUCOSE UR STRIP-MCNC: NEGATIVE MG/DL
HCT VFR BLD AUTO: 39.8 % (ref 36–46)
HGB BLD-MCNC: 12.3 G/DL (ref 12–16)
HGB UR QL STRIP.AUTO: NEGATIVE
IRON SATN MFR SERPL: 6 % (ref 20–55)
IRON SERPL-MCNC: 23 UG/DL (ref 37–145)
KETONES UR STRIP-MCNC: ABNORMAL MG/DL
LEUKOCYTE ESTERASE UR QL STRIP: NEGATIVE
MAGNESIUM SERPL-MCNC: 1.8 MG/DL (ref 1.6–2.6)
MCH RBC QN AUTO: 24.4 PG (ref 26–34)
MCHC RBC AUTO-ENTMCNC: 31 G/DL (ref 31–37)
MCV RBC AUTO: 78.7 FL (ref 80–100)
NITRITE UR QL STRIP: NEGATIVE
PH UR STRIP: 5.5 [PH] (ref 5–8)
PLATELET # BLD AUTO: 324 K/UL (ref 150–450)
PMV BLD AUTO: 9.3 FL (ref 6–12)
POTASSIUM SERPL-SCNC: 3.9 MMOL/L (ref 3.7–5.3)
PROT SERPL-MCNC: 7.1 G/DL (ref 6.4–8.3)
PROT UR STRIP-MCNC: NEGATIVE MG/DL
RBC # BLD AUTO: 5.05 M/UL (ref 4–5.2)
RHEUMATOID FACT SER NEPH-ACNC: <10 IU/ML (ref 0–13)
SODIUM SERPL-SCNC: 138 MMOL/L (ref 135–144)
SP GR UR STRIP: 1.02 (ref 1–1.03)
TIBC SERPL-MCNC: 403 UG/DL (ref 250–450)
TSH SERPL DL<=0.05 MIU/L-ACNC: 0.73 UIU/ML (ref 0.3–5)
UNSATURATED IRON BINDING CAPACITY: 380 UG/DL (ref 112–347)
UROBILINOGEN UR STRIP-ACNC: NORMAL EU/DL (ref 0–1)
VIT B12 SERPL-MCNC: 404 PG/ML (ref 232–1245)
WBC OTHER # BLD: 11.2 K/UL (ref 3.5–11)

## 2024-08-20 PROCEDURE — 85652 RBC SED RATE AUTOMATED: CPT

## 2024-08-20 PROCEDURE — 86431 RHEUMATOID FACTOR QUANT: CPT

## 2024-08-20 PROCEDURE — 83550 IRON BINDING TEST: CPT

## 2024-08-20 PROCEDURE — 83036 HEMOGLOBIN GLYCOSYLATED A1C: CPT

## 2024-08-20 PROCEDURE — 86140 C-REACTIVE PROTEIN: CPT

## 2024-08-20 PROCEDURE — 81003 URINALYSIS AUTO W/O SCOPE: CPT

## 2024-08-20 PROCEDURE — 83540 ASSAY OF IRON: CPT

## 2024-08-20 PROCEDURE — 83735 ASSAY OF MAGNESIUM: CPT

## 2024-08-20 PROCEDURE — 86225 DNA ANTIBODY NATIVE: CPT

## 2024-08-20 PROCEDURE — 80053 COMPREHEN METABOLIC PANEL: CPT

## 2024-08-20 PROCEDURE — 82607 VITAMIN B-12: CPT

## 2024-08-20 PROCEDURE — 36415 COLL VENOUS BLD VENIPUNCTURE: CPT

## 2024-08-20 PROCEDURE — 82728 ASSAY OF FERRITIN: CPT

## 2024-08-20 PROCEDURE — 86038 ANTINUCLEAR ANTIBODIES: CPT

## 2024-08-20 PROCEDURE — 82746 ASSAY OF FOLIC ACID SERUM: CPT

## 2024-08-20 PROCEDURE — 82306 VITAMIN D 25 HYDROXY: CPT

## 2024-08-20 PROCEDURE — 85027 COMPLETE CBC AUTOMATED: CPT

## 2024-08-20 PROCEDURE — 84443 ASSAY THYROID STIM HORMONE: CPT

## 2024-08-21 LAB
ANA SER QL IA: NEGATIVE
DSDNA IGG SER QL IA: <0.5 IU/ML
EST. AVERAGE GLUCOSE BLD GHB EST-MCNC: 105 MG/DL
HBA1C MFR BLD: 5.3 % (ref 4–6)
NUCLEAR IGG SER IA-RTO: <0.1 U/ML

## 2024-08-22 ENCOUNTER — HOSPITAL ENCOUNTER (OUTPATIENT)
Age: 29
Discharge: HOME | End: 2024-08-22
Payer: COMMERCIAL

## 2024-08-22 DIAGNOSIS — E78.1 HYPERTRIGLYCERIDEMIA: ICD-10-CM

## 2024-08-22 DIAGNOSIS — E61.1 LOW IRON: Primary | ICD-10-CM

## 2024-08-22 LAB
CHOLEST SERPL-MCNC: 155 MG/DL
CHOLESTEROL/HDL RATIO: 4.7
HDLC SERPL-MCNC: 33 MG/DL
LDLC SERPL CALC-MCNC: 99 MG/DL (ref 0–130)
TRIGL SERPL-MCNC: 113 MG/DL

## 2024-08-22 PROCEDURE — 36415 COLL VENOUS BLD VENIPUNCTURE: CPT

## 2024-08-22 PROCEDURE — 80061 LIPID PANEL: CPT

## 2024-08-22 RX ORDER — FERROUS SULFATE 325(65) MG
325 TABLET ORAL
Qty: 90 TABLET | Refills: 1 | Status: SHIPPED | OUTPATIENT
Start: 2024-08-22

## 2024-08-22 NOTE — RESULT ENCOUNTER NOTE
Please notify patient that iron levels are low, would benefit from a supplement.  I will place an order for this.  Recheck iron in about 3 months.    Sed rate, CRP and white blood cell count are all high, has she been sick recently?  Cough?  Congestion?  Urinary tract infection symptoms?    Kidney function is elevated as well, ensure you are drinking of water throughout the day.    No signs infection noted in the urine at this time.    Autoimmune workup is negative.    A1c is normal.    Other labs seem to be within normal limits.

## 2024-08-29 ENCOUNTER — OFFICE VISIT (OUTPATIENT)
Dept: DERMATOLOGY | Age: 29
End: 2024-08-29
Payer: COMMERCIAL

## 2024-08-29 VITALS
SYSTOLIC BLOOD PRESSURE: 122 MMHG | TEMPERATURE: 98.2 F | BODY MASS INDEX: 40.63 KG/M2 | WEIGHT: 238 LBS | OXYGEN SATURATION: 98 % | HEART RATE: 94 BPM | HEIGHT: 64 IN | DIASTOLIC BLOOD PRESSURE: 89 MMHG

## 2024-08-29 DIAGNOSIS — L70.0 ACNE VULGARIS: ICD-10-CM

## 2024-08-29 DIAGNOSIS — L73.2 HIDRADENITIS SUPPURATIVA: Primary | ICD-10-CM

## 2024-08-29 PROCEDURE — 11900 INJECT SKIN LESIONS </W 7: CPT | Performed by: PHYSICIAN ASSISTANT

## 2024-08-29 PROCEDURE — 99214 OFFICE O/P EST MOD 30 MIN: CPT | Performed by: PHYSICIAN ASSISTANT

## 2024-08-29 RX ORDER — CLINDAMYCIN PHOSPHATE 10 UG/ML
LOTION TOPICAL
Qty: 60 ML | Refills: 3 | Status: SHIPPED | OUTPATIENT
Start: 2024-08-29

## 2024-08-29 RX ORDER — SPIRONOLACTONE 50 MG/1
50 TABLET, FILM COATED ORAL DAILY
Qty: 90 TABLET | Refills: 1 | Status: SHIPPED | OUTPATIENT
Start: 2024-08-29

## 2024-08-29 RX ORDER — DOXYCYCLINE 100 MG/1
CAPSULE ORAL
Qty: 60 CAPSULE | Refills: 2 | Status: SHIPPED | OUTPATIENT
Start: 2024-08-29

## 2024-08-29 NOTE — PROGRESS NOTES
Dermatology Patient Note  [facility] PHYSICIANS [facility], [facility] DERMATOLOGY  [address]  [address]  [place] [address]  Dept: [phone]  Dept Fax: [phone]      VISITDATE: 8/29/2024   REFERRING PROVIDER: No ref. provider found      Masoud Soliman is a 29 y.o. female  who presents today in the office for:    Other (Patient presents in the office today for follow up for psoriasis. She has been having flares on her legs, bilateral axilla and  bikini area. She states she was getting infections from this.  She is getting boils in her bilateral axilla and groin area as well. )      HISTORY OF PRESENT ILLNESS:  As above.  \"Abscesses\" have been drained by the emergency room as well.  Pt notes premenstrual acne flares as well.  She has been diagnosed with PCOS.      MEDICAL PROBLEMS:  Patient Active Problem List    Diagnosis Date Noted    Postural dizziness with presyncope 09/07/2022     Priority: Medium    Acne vulgaris 06/02/2022     Priority: Medium    Low serum cortisol level 11/25/2023    Claustrophobia 09/07/2023    Syncope 09/04/2023    Headache disorder 08/30/2023    IUD (intrauterine device) in place 07/03/2023     Formatting of this note might be different from the original.  Paragard inserted 7/3/23      Migraine 10/10/2022    Polycystic ovarian syndrome 10/10/2022    Body mass index (BMI) 45.0-49.9, adult (HCC) 03/23/2022    Prediabetes 03/22/2022    Numbness and tingling of both feet 07/08/2021    Impaired glucose tolerance in pregnancy 11/13/2019    Seasonal allergic rhinitis 02/22/2017    Urticaria 12/17/2016    Chronic midline thoracic back pain 12/15/2016    Attention deficit hyperactivity disorder (ADHD), predominantly inattentive type 10/20/2016    Fatigue 06/17/2016    Tattoo of skin 06/16/2016    Multiple body piercings 06/16/2016    Hair loss 06/16/2016       CURRENT MEDICATIONS:   Current Outpatient Medications   Medication Sig Dispense Refill       Pulse 94   Temp 98.2 °F (36.8 °C)   Ht 1.626 m (5' 4\")   Wt 108 kg (238 lb)   SpO2 98%   BMI 40.85 kg/m²     The patient is generally well appearing, well nourished, alert and conversational. Affect is normal.    Cutaneous Exam:  Physical Exam  Sun exposed + limited LEs: Head/face,neck, both arms, digits and/or nails and legs visible with pants/shorts and shoes/socks on was examined. + axilla    Facial covering was removed during examination.    Diagnoses/exam findings/medical history pertinent to this visit are listed below:    Assessment:   Diagnosis Orders   1. Hidradenitis suppurativa  triamcinolone acetonide (KENALOG) injection 2 mg    INJECTION,INTRALESIONAL;UP TO AND INCLUD 7 LESIONS    clindamycin (CLEOCIN T) 1 % lotion    benzoyl peroxide 5 % external liquid    doxycycline hyclate (VIBRAMYCIN) 100 MG capsule    spironolactone (ALDACTONE) 50 MG tablet      2. Acne vulgaris  spironolactone (ALDACTONE) 50 MG tablet           Plan:  1. Hidradenitis suppurativa (axillary, medial thighs, and inguinal - Stage II)  - chronic illness with progression and/or exacerbation   Intralesional Injection: After discussion of risks including atrophy and dyspigmentation, patient gives verbal consent to proceed. After cleansing with alcohol the HS nodules on the right axilla were injected with 0.2 ml of Kenalog 10 mg/mL   - triamcinolone acetonide (KENALOG) injection 2 mg  - INJECTION,INTRALESIONAL;UP TO AND INCLUD 7 LESIONS  - clindamycin (CLEOCIN T) 1 % lotion; Apply to affected areas daily  Dispense: 60 mL; Refill: 3  - benzoyl peroxide 5 % external liquid; Wash affected areas once daily  Dispense: 227 g; Refill: 3  - doxycycline hyclate (VIBRAMYCIN) 100 MG capsule; Take 1 pill twice daily with food  Dispense: 60 capsule; Refill: 2  - spironolactone (ALDACTONE) 50 MG tablet; Take 1 tablet by mouth daily  Dispense: 90 tablet; Refill: 1    2. Acne vulgaris  - chronic illness with progression and/or exacerbation   -

## 2024-08-30 ENCOUNTER — TELEPHONE (OUTPATIENT)
Dept: DERMATOLOGY | Age: 29
End: 2024-08-30

## 2024-08-30 ENCOUNTER — OFFICE VISIT (OUTPATIENT)
Dept: DERMATOLOGY | Age: 29
End: 2024-08-30
Payer: COMMERCIAL

## 2024-08-30 VITALS
SYSTOLIC BLOOD PRESSURE: 129 MMHG | DIASTOLIC BLOOD PRESSURE: 91 MMHG | HEIGHT: 64 IN | BODY MASS INDEX: 40.29 KG/M2 | OXYGEN SATURATION: 99 % | TEMPERATURE: 97.6 F | HEART RATE: 107 BPM | WEIGHT: 236 LBS

## 2024-08-30 DIAGNOSIS — L73.2 HIDRADENITIS: Primary | ICD-10-CM

## 2024-08-30 DIAGNOSIS — L02.91 ABSCESS: ICD-10-CM

## 2024-08-30 PROCEDURE — 10060 I&D ABSCESS SIMPLE/SINGLE: CPT | Performed by: PHYSICIAN ASSISTANT

## 2024-08-30 NOTE — TELEPHONE ENCOUNTER
Patient called has HS you did an injection under arm and patient is in a lot of pain and it is causing problems with movement. Per Anthony he wants he worked in today.

## 2024-08-30 NOTE — PROGRESS NOTES
Dermatology Procedure Note   [facility], [facility] DERMATOLOGY  [address]  [address]  [place] [address]  Dept: [phone]  Dept Fax: [phone]      Procedure Date: 8/30/2024  Procedure Time: 1:44 PM    Procedure Practitioner: Anthony Adkins PA-C    Procedure:  Incision and drainage    Pre-Procedure Diagnosis:  Abscess    Post-Procedure Diagnosis: Same as Pre-Procedure Diagnosis    Informed Consent: The procedure and its risks were explained including but not limited to pain, bleeding, infection, permanent scar, permanent pigment alteration and recurrence. Consent to proceed with the procedure was obtained from the patient or the parent by the practitioner    Time Out:  A time out was conducted immediately before starting the procedure that confirmed a final verification of the correct patient, correct procedure, and correct site.    Procedure Details:  Incision and drainage (Right axilla): Risks/benefits of procedure including but not limited to bleeding, infection, scarring discussed.  Patient would like to proceed with I&D of lesion.  Area prepared in usual fashion with 70% isopropyl alcohol.  Anesthesia was administered as a ring block using 1% lidocaine with 1:100,000 epinephrine.  Adequate linear incision over the lesion apex was made with sterile 11 blade.  Manual centripetal pressure was applied to thoroughly express lesional contents. Contents were not sent for culture.  Hemostasis achieved spontaneously and with pressure. A bulky wound dressing was applied in usual fashion.  The patient tolerated the procedure well.  There were no immediate complications.  Wound care was verbally discussed and included in written format in patient's AVS.     Procedure Performed By: Anthony Adkins PA-C    Estimated Blood Loss: Minimal    Pathologic Specimen: none sent    Procedure Tolerance: Good    Complication(s): None    Electronically signed by Anthony  SHOAIB Adkins on 8/30/24 at 1:44 PM EDT

## 2024-09-03 ENCOUNTER — TELEPHONE (OUTPATIENT)
Dept: DERMATOLOGY | Age: 29
End: 2024-09-03

## 2024-09-03 NOTE — TELEPHONE ENCOUNTER
Pt wanted to let you know that she wound up going to ED, [facility] 8/31/24, they changed her antibiotics and she said she was very ill over the weekend/had a fever. Please advise if anything additional is needed.

## 2024-09-05 NOTE — TELEPHONE ENCOUNTER
Patient called back to inform you that she is feeling worse, fever has gone away but may be having a reaction to the bactrim DS. Patient informed that she needs to go to the ER d/t symptoms getting worse. Pt stated understanding

## 2024-09-13 DIAGNOSIS — L73.2 HIDRADENITIS SUPPURATIVA: ICD-10-CM

## 2024-09-13 RX ORDER — CLINDAMYCIN PHOSPHATE 10 UG/ML
LOTION TOPICAL
Qty: 60 ML | Refills: 3 | Status: SHIPPED | OUTPATIENT
Start: 2024-09-13

## 2024-09-19 PROBLEM — R11.10 VOMITING: Status: ACTIVE | Noted: 2024-05-30

## 2024-09-19 PROBLEM — R79.89 OTHER SPECIFIED ABNORMAL FINDINGS OF BLOOD CHEMISTRY: Status: ACTIVE | Noted: 2024-05-30

## 2024-09-19 PROBLEM — L02.91 ABSCESS: Status: ACTIVE | Noted: 2024-07-07

## 2024-09-19 PROBLEM — N30.01 ACUTE CYSTITIS WITH HEMATURIA: Status: ACTIVE | Noted: 2024-05-30

## 2024-11-27 ENCOUNTER — TELEPHONE (OUTPATIENT)
Dept: NEUROLOGY | Age: 29
End: 2024-11-27

## 2024-11-27 NOTE — TELEPHONE ENCOUNTER
11 27 2024 called the patient times 2 (11 13 2024 left voicemail asking the patient to call the office back to schedule and 11 20 2024 the voicemail was full at ) to schedule new patient appointment with one of our providers, no response.  I mailed the patient a letter asking them to call the office back to schedule this appointment.  KS

## 2025-01-15 ENCOUNTER — OFFICE VISIT (OUTPATIENT)
Dept: BARIATRICS/WEIGHT MGMT | Age: 30
End: 2025-01-15
Payer: COMMERCIAL

## 2025-01-15 VITALS
HEIGHT: 64 IN | HEART RATE: 80 BPM | DIASTOLIC BLOOD PRESSURE: 80 MMHG | SYSTOLIC BLOOD PRESSURE: 120 MMHG | WEIGHT: 208 LBS | BODY MASS INDEX: 35.51 KG/M2

## 2025-01-15 DIAGNOSIS — E88.819 INSULIN RESISTANCE: Primary | ICD-10-CM

## 2025-01-15 DIAGNOSIS — E66.812 CLASS 2 SEVERE OBESITY DUE TO EXCESS CALORIES WITH SERIOUS COMORBIDITY AND BODY MASS INDEX (BMI) OF 35.0 TO 35.9 IN ADULT: ICD-10-CM

## 2025-01-15 DIAGNOSIS — E28.2 PCOS (POLYCYSTIC OVARIAN SYNDROME): ICD-10-CM

## 2025-01-15 DIAGNOSIS — L73.2 HIDRADENITIS SUPPURATIVA: ICD-10-CM

## 2025-01-15 DIAGNOSIS — E66.01 CLASS 2 SEVERE OBESITY DUE TO EXCESS CALORIES WITH SERIOUS COMORBIDITY AND BODY MASS INDEX (BMI) OF 35.0 TO 35.9 IN ADULT: ICD-10-CM

## 2025-01-15 PROBLEM — R51.9 HEADACHE DISORDER: Status: RESOLVED | Noted: 2023-08-30 | Resolved: 2025-01-15

## 2025-01-15 PROBLEM — R20.2 NUMBNESS AND TINGLING OF BOTH FEET: Status: RESOLVED | Noted: 2021-07-08 | Resolved: 2025-01-15

## 2025-01-15 PROBLEM — R20.0 NUMBNESS AND TINGLING OF BOTH FEET: Status: RESOLVED | Noted: 2021-07-08 | Resolved: 2025-01-15

## 2025-01-15 PROBLEM — O99.810 IMPAIRED GLUCOSE TOLERANCE IN PREGNANCY: Status: RESOLVED | Noted: 2019-11-13 | Resolved: 2025-01-15

## 2025-01-15 PROBLEM — R79.89 OTHER SPECIFIED ABNORMAL FINDINGS OF BLOOD CHEMISTRY: Status: RESOLVED | Noted: 2024-05-30 | Resolved: 2025-01-15

## 2025-01-15 PROBLEM — L02.91 ABSCESS: Status: RESOLVED | Noted: 2024-07-07 | Resolved: 2025-01-15

## 2025-01-15 PROBLEM — N30.01 ACUTE CYSTITIS WITH HEMATURIA: Status: RESOLVED | Noted: 2024-05-30 | Resolved: 2025-01-15

## 2025-01-15 PROBLEM — R11.10 VOMITING: Status: RESOLVED | Noted: 2024-05-30 | Resolved: 2025-01-15

## 2025-01-15 PROCEDURE — 99214 OFFICE O/P EST MOD 30 MIN: CPT | Performed by: NURSE PRACTITIONER

## 2025-01-15 RX ORDER — PHENTERMINE HYDROCHLORIDE 37.5 MG/1
37.5 TABLET ORAL
Qty: 30 TABLET | Refills: 0 | Status: SHIPPED | OUTPATIENT
Start: 2025-01-15 | End: 2025-02-14

## 2025-01-15 ASSESSMENT — ENCOUNTER SYMPTOMS
VOMITING: 0
ABDOMINAL PAIN: 0
WHEEZING: 0
SHORTNESS OF BREATH: 0
COUGH: 0
CHEST TIGHTNESS: 0
NAUSEA: 0

## 2025-01-15 NOTE — PROGRESS NOTES
[facility] INVASIVE BARIATRIC SURG  [address]  [address]  [place] [id number]  Dept: [phone]    MEDICATION WEIGHT LOSS MANAGEMENT PROGRAM  PROGRESS NOTE     CC: Weight Loss    Patient: Masoud Soliman      Service Date: 1/15/2025  Visit:   7 month follow up on medications.     Medical Record #: [id number]  Current Visit Weight Information  Weight: 94.3 kg (208 lb)   BMI: Body mass index is 35.7 kg/m².        History: 29 y.o. female who presents today for a follow-up on weight loss medications.  Patient initially started with our office in December 2023.  At that time she was breast-feeding so medications were not started.  Adipex was started February 2024 and she was followed monthly.  She had lost 61 pounds with diet changes and Adipex.  When we last saw her in July she was struggling with multiple infections including an abscess and UTI.  She was struggling with a lot of nausea, vomiting and fevers.  At that point we advised she was okay to continue with the metformin but we are going to discontinue the Adipex since she was not feeling well.  She had been on and off of it for the 2 months prior and did well with stopping it.  She states that she is feeling much better.  She has been diagnosed with hidradenitis suppurativa.  She states she still getting abscesses occasionally but she has learned how to manage them.  She has not had any issues with feeling sick, fever, nausea and vomiting.  Patient has continued to lose weight on her own and since July 2024 she has lost an additional 32 pounds on her own.  She has been doing well with drinking plenty of water during the day, avoiding sugary drinks, incorporating fruits, vegetables and protein.  She has been working 8 to 12-hour shifts and her job is very physical.  She is on night shift now.  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

## 2025-01-15 NOTE — PROGRESS NOTES
Medical Nutrition Therapy for Non-Surgical Weight Loss  Nutrition Follow-Up: Weight Loss Medication    Nutrition Assessment:  Patient is restarting adipex per DANE Guevara for weight loss.   Patient has lost 32 lbs since last seen in July 2024.  Patient reports diet is hit or miss but portions and cravings are improved overall. Does feel she makes good choices with food options.  Patient is eating within 2-12 hours of waking up. On average, eats 2 meals and some snacks.  Patient is drinking at least + oz of water. Patient is typically drinking water, 1 zero sugar energy drink, occasional gatorade zero.  Patient reports  stair stepper three to five times per week (10-20 minutes)  to remain active.   Discussed goal of eating breakfast with protein within 1-2 hours of waking. Encouraged eat something every 3-5 hours, avoid prolonged periods of time without eating while awake. Encouraged consistent water intake.    24-hr diet recall scanned into chart.    Vitals:   Vitals:    01/15/25 1041   BP: 120/80   Site: Right Upper Arm   Position: Sitting   Cuff Size: Large Adult   Pulse: 80   Weight: 94.3 kg (208 lb)   Height: 1.626 m (5' 4\")      Body mass index is 35.7 kg/m².    Estimated Energy Needs:  Calorie (MSJ x AF - 500): 1400 kcals  Protein: 60-80 grams protein  Fluids: minimum 64 oz fluid    Nutrition Diagnosis:  Overweight/Obesity related to complex combination of decreased energy needs, disordered eating patterns, physical inactivity, and increased psychological/life stress as evidenced by Body mass index is 35.7 kg/m².    Nutrition Intervention:  Diet: Low-Fat Diet, 60-80 gm of protein, and 64 oz of fluid/day.    Nutrition Education: Nutrition Care Manual    Goal:   Eat a variety of fruits and vegetables, lean protein, whole grains and low-fat dairy.   Sip on water or sugar-free fluid throughout the day.   Aim for 3 meals and 1-2 snacks daily.    Discuss activity with physician and determine a

## 2025-02-12 ENCOUNTER — TELEPHONE (OUTPATIENT)
Dept: BARIATRICS/WEIGHT MGMT | Age: 30
End: 2025-02-12

## 2025-02-12 NOTE — TELEPHONE ENCOUNTER
Patient called to reschedule appointment. She wanted to let you know this is her second time trying adipex and the pills are making her very sick so she stopped them on Monday, Feb 10th. She has not yet been able to get her blood work done due to work. Rescheduled for March to discuss other options.    Melani Alexander MS, RD, LD  Clinical Dietitian  [facility]  [phone]

## 2025-02-28 ENCOUNTER — OFFICE VISIT (OUTPATIENT)
Dept: FAMILY MEDICINE CLINIC | Age: 30
End: 2025-02-28
Payer: COMMERCIAL

## 2025-02-28 VITALS
BODY MASS INDEX: 34.49 KG/M2 | TEMPERATURE: 98.4 F | WEIGHT: 202 LBS | HEART RATE: 68 BPM | SYSTOLIC BLOOD PRESSURE: 103 MMHG | OXYGEN SATURATION: 99 % | DIASTOLIC BLOOD PRESSURE: 67 MMHG | HEIGHT: 64 IN

## 2025-02-28 DIAGNOSIS — R79.89 ELEVATED SERUM CREATININE: ICD-10-CM

## 2025-02-28 DIAGNOSIS — G89.29 CHRONIC MIDLINE THORACIC BACK PAIN: ICD-10-CM

## 2025-02-28 DIAGNOSIS — J06.9 VIRAL URI: Primary | ICD-10-CM

## 2025-02-28 DIAGNOSIS — F90.9 ATTENTION DEFICIT HYPERACTIVITY DISORDER (ADHD), UNSPECIFIED ADHD TYPE: ICD-10-CM

## 2025-02-28 DIAGNOSIS — M54.6 CHRONIC MIDLINE THORACIC BACK PAIN: ICD-10-CM

## 2025-02-28 PROCEDURE — 99214 OFFICE O/P EST MOD 30 MIN: CPT | Performed by: NURSE PRACTITIONER

## 2025-02-28 PROCEDURE — G8427 DOCREV CUR MEDS BY ELIG CLIN: HCPCS | Performed by: NURSE PRACTITIONER

## 2025-02-28 PROCEDURE — G8417 CALC BMI ABV UP PARAM F/U: HCPCS | Performed by: NURSE PRACTITIONER

## 2025-02-28 PROCEDURE — 1036F TOBACCO NON-USER: CPT | Performed by: NURSE PRACTITIONER

## 2025-02-28 RX ORDER — ALBUTEROL SULFATE 90 UG/1
2 INHALANT RESPIRATORY (INHALATION) 4 TIMES DAILY PRN
Qty: 18 G | Refills: 0 | Status: SHIPPED | OUTPATIENT
Start: 2025-02-28

## 2025-02-28 RX ORDER — METHYLPREDNISOLONE 4 MG/1
TABLET ORAL
Qty: 1 KIT | Refills: 0 | Status: SHIPPED | OUTPATIENT
Start: 2025-02-28 | End: 2025-03-06

## 2025-02-28 RX ORDER — ATOMOXETINE 40 MG/1
40 CAPSULE ORAL DAILY
Qty: 30 CAPSULE | Refills: 0 | Status: SHIPPED | OUTPATIENT
Start: 2025-02-28 | End: 2025-03-30

## 2025-02-28 SDOH — ECONOMIC STABILITY: FOOD INSECURITY: WITHIN THE PAST 12 MONTHS, YOU WORRIED THAT YOUR FOOD WOULD RUN OUT BEFORE YOU GOT MONEY TO BUY MORE.: PATIENT DECLINED

## 2025-02-28 SDOH — ECONOMIC STABILITY: FOOD INSECURITY: WITHIN THE PAST 12 MONTHS, THE FOOD YOU BOUGHT JUST DIDN'T LAST AND YOU DIDN'T HAVE MONEY TO GET MORE.: PATIENT DECLINED

## 2025-02-28 ASSESSMENT — PATIENT HEALTH QUESTIONNAIRE - PHQ9
9. THOUGHTS THAT YOU WOULD BE BETTER OFF DEAD, OR OF HURTING YOURSELF: NOT AT ALL
SUM OF ALL RESPONSES TO PHQ QUESTIONS 1-9: 7
1. LITTLE INTEREST OR PLEASURE IN DOING THINGS: SEVERAL DAYS
8. MOVING OR SPEAKING SO SLOWLY THAT OTHER PEOPLE COULD HAVE NOTICED. OR THE OPPOSITE, BEING SO FIGETY OR RESTLESS THAT YOU HAVE BEEN MOVING AROUND A LOT MORE THAN USUAL: NOT AT ALL
7. TROUBLE CONCENTRATING ON THINGS, SUCH AS READING THE NEWSPAPER OR WATCHING TELEVISION: SEVERAL DAYS
4. FEELING TIRED OR HAVING LITTLE ENERGY: SEVERAL DAYS
2. FEELING DOWN, DEPRESSED OR HOPELESS: SEVERAL DAYS
3. TROUBLE FALLING OR STAYING ASLEEP: SEVERAL DAYS
SUM OF ALL RESPONSES TO PHQ QUESTIONS 1-9: 7
SUM OF ALL RESPONSES TO PHQ QUESTIONS 1-9: 7
10. IF YOU CHECKED OFF ANY PROBLEMS, HOW DIFFICULT HAVE THESE PROBLEMS MADE IT FOR YOU TO DO YOUR WORK, TAKE CARE OF THINGS AT HOME, OR GET ALONG WITH OTHER PEOPLE: NOT DIFFICULT AT ALL
6. FEELING BAD ABOUT YOURSELF - OR THAT YOU ARE A FAILURE OR HAVE LET YOURSELF OR YOUR FAMILY DOWN: SEVERAL DAYS
SUM OF ALL RESPONSES TO PHQ9 QUESTIONS 1 & 2: 2
SUM OF ALL RESPONSES TO PHQ QUESTIONS 1-9: 7
5. POOR APPETITE OR OVEREATING: SEVERAL DAYS

## 2025-02-28 ASSESSMENT — ANXIETY QUESTIONNAIRES
5. BEING SO RESTLESS THAT IT IS HARD TO SIT STILL: MORE THAN HALF THE DAYS
GAD7 TOTAL SCORE: 12
4. TROUBLE RELAXING: MORE THAN HALF THE DAYS
1. FEELING NERVOUS, ANXIOUS, OR ON EDGE: MORE THAN HALF THE DAYS
3. WORRYING TOO MUCH ABOUT DIFFERENT THINGS: MORE THAN HALF THE DAYS
7. FEELING AFRAID AS IF SOMETHING AWFUL MIGHT HAPPEN: NOT AT ALL
2. NOT BEING ABLE TO STOP OR CONTROL WORRYING: MORE THAN HALF THE DAYS
IF YOU CHECKED OFF ANY PROBLEMS ON THIS QUESTIONNAIRE, HOW DIFFICULT HAVE THESE PROBLEMS MADE IT FOR YOU TO DO YOUR WORK, TAKE CARE OF THINGS AT HOME, OR GET ALONG WITH OTHER PEOPLE: SOMEWHAT DIFFICULT
6. BECOMING EASILY ANNOYED OR IRRITABLE: MORE THAN HALF THE DAYS

## 2025-02-28 ASSESSMENT — ENCOUNTER SYMPTOMS
VOMITING: 0
NAUSEA: 0
SHORTNESS OF BREATH: 0
DIARRHEA: 0
COUGH: 1
CHEST TIGHTNESS: 0
WHEEZING: 0
BLOOD IN STOOL: 0
ABDOMINAL PAIN: 0
BACK PAIN: 1
CONSTIPATION: 0
ABDOMINAL DISTENTION: 0

## 2025-02-28 ASSESSMENT — COLUMBIA-SUICIDE SEVERITY RATING SCALE - C-SSRS
2. HAVE YOU ACTUALLY HAD ANY THOUGHTS OF KILLING YOURSELF?: NO
1. WITHIN THE PAST MONTH, HAVE YOU WISHED YOU WERE DEAD OR WISHED YOU COULD GO TO SLEEP AND NOT WAKE UP?: NO

## 2025-02-28 NOTE — PROGRESS NOTES
Masoud Soliman (:  1995) is a 30 y.o. female,Established patient, here for evaluation of the following chief complaint(s): Postural dizziness with presyncope, Forms (Stated no paperwork is needed , just a letter for restriction ), Back Pain (Lower, upper back pain ), Shoulder Pain (Left upper shoulder pain ), Immunizations (Declines ), and Cold Symptoms (Ongoing for 2 days, body chills, cough chest pain )      ASSESSMENT/PLAN:    Masoud received counseling on the following healthy behaviors: nutrition, exercise, and medication adherence  Reviewed prior labs and health maintenance  Discussed use, benefit, and side effects of prescribed medications.   Barriers to medication compliance addressed.   Patient given educational materials - see patient instructions  All patient questions answered.  Patient voiced understanding.  The patient's past medical,surgical, social, and family history as well as her current medications and allergies were reviewed as documented in today's encounter.    Medications, labs, diagnostic studies, consultations and follow-up as documented in this encounter.    Masoud was seen today for postural dizziness with presyncope, forms, back pain, shoulder pain, immunizations and cold symptoms.    Diagnoses and all orders for this visit:    Viral URI  -Started few days ago  -Continue over-the-counter flu and cold medication  -Additional medication ordered, albuterol and Medrol Dosepak  -     albuterol sulfate HFA (VENTOLIN HFA) 108 (90 Base) MCG/ACT inhaler; Inhale 2 puffs into the lungs 4 times daily as needed for Wheezing  -     methylPREDNISolone (MEDROL DOSEPACK) 4 MG tablet; Take by mouth.    Elevated serum creatinine  -Noted on previous blood work  -Plan to repeat  -     Basic Metabolic Panel; Future    Attention deficit hyperactivity disorder (ADHD), unspecified ADHD type  -Chronic condition  -Well-controlled  -Plan to try Strattera  -Stay on Adderall, years ago  -     atomoxetine

## 2025-02-28 NOTE — PROGRESS NOTES
Visit Information    Have you changed or started any medications since your last visit including any over-the-counter medicines, vitamins, or herbal medicines? no   Have you stopped taking any of your medications? Is so, why? -  no  Are you having any side effects from any of your medications? - no    Have you seen any other physician or provider since your last visit?  yes -     Have you had any other diagnostic tests since your last visit?  no   Have you been seen in the emergency room and/or had an admission in a hospital since we last saw you?  no   Have you had your routine dental cleaning in the past 6 months?  no     Do you have an active MyChart account? If no, what is the barrier?  Yes    Patient Care Team:  Karla Philippe MD as PCP - General (Family Medicine)  Karla Philippe MD as PCP - Empaneled Provider  Jt Phillip MD as Obstetrician (Perinatology)  Daniel Serna DO as Consulting Physician (Obstetrics & Gynecology)  Domenica Odonnell MD as Consulting Physician (Cardiology)  Madison Chicas PA as Physician Assistant (Neurology)    Medical History Review  Past Medical, Family, and Social History reviewed and does contribute to the patient presenting condition    Health Maintenance   Topic Date Due    Hepatitis B vaccine (1 of 3 - 19+ 3-dose series) Never done    Flu vaccine (1) Never done    COVID-19 Vaccine (1 - 2024-25 season) Never done    Depression Monitoring  06/10/2025    A1C test (Diabetic or Prediabetic)  08/20/2025    Cervical cancer screen  12/05/2025    DTaP/Tdap/Td vaccine (2 - Td or Tdap) 05/19/2033    Meningococcal (ACWY) vaccine  Completed    Hepatitis C screen  Completed    HIV screen  Completed    Hepatitis A vaccine  Aged Out    Hib vaccine  Aged Out    HPV vaccine  Aged Out    Polio vaccine  Aged Out    Pneumococcal 0-49 years Vaccine  Aged Out    Varicella vaccine  Discontinued    Depression Screen  Discontinued

## 2025-03-21 ENCOUNTER — TELEMEDICINE (OUTPATIENT)
Dept: FAMILY MEDICINE CLINIC | Age: 30
End: 2025-03-21
Payer: COMMERCIAL

## 2025-03-21 DIAGNOSIS — M54.32 CHRONIC SCIATICA, LEFT: Primary | ICD-10-CM

## 2025-03-21 DIAGNOSIS — G89.29 CHRONIC BILATERAL THORACIC BACK PAIN: ICD-10-CM

## 2025-03-21 DIAGNOSIS — R55 POSTURAL DIZZINESS WITH PRESYNCOPE: ICD-10-CM

## 2025-03-21 DIAGNOSIS — R42 POSTURAL DIZZINESS WITH PRESYNCOPE: ICD-10-CM

## 2025-03-21 DIAGNOSIS — F90.9 ATTENTION DEFICIT HYPERACTIVITY DISORDER (ADHD), UNSPECIFIED ADHD TYPE: ICD-10-CM

## 2025-03-21 DIAGNOSIS — M54.6 CHRONIC BILATERAL THORACIC BACK PAIN: ICD-10-CM

## 2025-03-21 PROCEDURE — G8427 DOCREV CUR MEDS BY ELIG CLIN: HCPCS | Performed by: FAMILY MEDICINE

## 2025-03-21 PROCEDURE — 99214 OFFICE O/P EST MOD 30 MIN: CPT | Performed by: FAMILY MEDICINE

## 2025-03-21 RX ORDER — BACLOFEN 10 MG/1
10 TABLET ORAL 3 TIMES DAILY PRN
Qty: 90 TABLET | Refills: 0 | Status: SHIPPED | OUTPATIENT
Start: 2025-03-21

## 2025-03-21 RX ORDER — BUPROPION HYDROCHLORIDE 150 MG/1
150 TABLET ORAL EVERY MORNING
Qty: 30 TABLET | Refills: 3 | Status: SHIPPED | OUTPATIENT
Start: 2025-03-21

## 2025-03-21 RX ORDER — METHYLPREDNISOLONE 4 MG/1
TABLET ORAL
Qty: 1 KIT | Refills: 0 | Status: SHIPPED | OUTPATIENT
Start: 2025-03-21

## 2025-03-21 RX ORDER — CAMPHOR, MENTHOL, METHYL SALICYLATE 3.1; 10; 15 G/100G; G/100G; G/100G
GEL PERCUTANEOUS; TOPICAL; TRANSDERMAL
Qty: 78 G | Refills: 1
Start: 2025-03-21

## 2025-03-21 ASSESSMENT — ENCOUNTER SYMPTOMS
BACK PAIN: 1
WHEEZING: 0
VOMITING: 0
DIARRHEA: 0
CONSTIPATION: 0
SHORTNESS OF BREATH: 0
COUGH: 0
NAUSEA: 0
ABDOMINAL DISTENTION: 0
ABDOMINAL PAIN: 0
CHEST TIGHTNESS: 0

## 2025-03-21 NOTE — PROGRESS NOTES
Masoud Soliman, was evaluated through a synchronous (real-time) audio-video encounter. The patient (or guardian if applicable) is aware that this is a billable service, which includes applicable co-pays. This Virtual Visit was conducted with patient's (and/or legal guardian's) consent. Patient identification was verified, and a caregiver was present when appropriate.   The patient was located at Home: [address] [place] [id number]  Provider was located at Facility (Appt Dept): [address] [place] [id number]  Confirm you are appropriately licensed, registered, or certified to deliver care in the state where the patient is located as indicated above. If you are not or unsure, please re-schedule the visit: Yes, I confirm.     Masoud Soliman (:  1995) is a Established patient, presenting virtually for evaluation of the following:Back Pain and ADHD           Below is the assessment and plan developed based on review of pertinent history, physical exam, labs, studies, and medications.     Assessment & Plan  Chronic sciatica, left    Acute on chronic flare up    A Medrol pack was prescribed to be taken with food in the morning starting the following day through [facility]. Additionally, baclofen was prescribed for use up to three times daily as needed, but only when at home and prior to sleep through [facility]. She was also advised to take magnesium 250 mg before sleep with food.  To continue to go to chiropractor, stretching and repositioning would be advisable, I highly doubt she has a herniated disc.  Orders:    baclofen (LIORESAL) 10 MG tablet; Take 1 tablet by mouth 3 times daily as needed (MUSCLE SPASMS) Causes sedation, do not drive while taking this medication    Magnesium Oxide (MAGNESIUM-OXIDE) 250 MG TABS tablet; Take 1 tablet by mouth Daily with supper    Camphor-Menthol-Methyl Sal (SALONPAS DEEP RELIEVING) 3.1-10-15 % GEL; TID as needed for back pain

## 2025-03-21 NOTE — ASSESSMENT & PLAN NOTE
Acute on chronic flare up    A Medrol pack was prescribed to be taken with food in the morning starting the following day through [facility]. Additionally, baclofen was prescribed for use up to three times daily as needed, but only when at home and prior to sleep through [facility]. She was also advised to take magnesium 250 mg before sleep with food.  To continue to go to chiropractor, stretching and repositioning would be advisable, I highly doubt she has a herniated disc.  Orders:    baclofen (LIORESAL) 10 MG tablet; Take 1 tablet by mouth 3 times daily as needed (MUSCLE SPASMS) Causes sedation, do not drive while taking this medication    Magnesium Oxide (MAGNESIUM-OXIDE) 250 MG TABS tablet; Take 1 tablet by mouth Daily with supper    Camphor-Menthol-Methyl Sal (SALONPAS DEEP RELIEVING) 3.1-10-15 % GEL; TID as needed for back pain    methylPREDNISolone (MEDROL, JENISE,) 4 MG tablet; Take by mouth, with food. Keep low carb, low salt diet while taking it      Letter to return back to work with no restrictions given otherwise she will lose her job.  She sent to me during the encounter the job description, she works for [employer].

## 2025-03-21 NOTE — ASSESSMENT & PLAN NOTE
Chronic, patient reports pain between the shoulder blades, She has chronic midline thoracic back pain and between shoulder blades with intermittent flare-ups and is going to a chiropractor. A letter was issued stating that she may return to full duty immediately with no restrictions, per her request otherwise she will lose her job.   Start muscle relaxant, stretching, magnesium at night, topical creams  Orders:    baclofen (LIORESAL) 10 MG tablet; Take 1 tablet by mouth 3 times daily as needed (MUSCLE SPASMS) Causes sedation, do not drive while taking this medication    Magnesium Oxide (MAGNESIUM-OXIDE) 250 MG TABS tablet; Take 1 tablet by mouth Daily with supper    Camphor-Menthol-Methyl Sal (SALONPAS DEEP RELIEVING) 3.1-10-15 % GEL; TID as needed for back pain

## 2025-03-21 NOTE — ASSESSMENT & PLAN NOTE
Chronic, ongoing  Increase fluids, compression stockings every day  She was advised to stay hydrated and wear compression stockings daily.  self-discontinued Pilar  Patient reports she did inform her cardiologist

## 2025-03-21 NOTE — ASSESSMENT & PLAN NOTE
Chronic, ongoing, not controlled    Her ADHD is classified as mild. A comprehensive discussion regarding potential treatment options was conducted. She was advised to incorporate caffeine into her diet and ensure adequate sleep. A prescription for WellbutrinXL 150 mg extended release, to be taken once daily with 3 refills, was provided through [facility]. She was also recommended to take vitamin B12 1000 mcg and fish oil supplements.  Avoid stimulants like Adderall due to POTS  Improve sleep  Couldn't tolerate Morenita Quevedo ADHD book. Please consider reading   \"More attention, less deficit.  Success strategies for adults with ADHD\"  by Barry Cordova PsyD, SENTHIL     Orders:    buPROPion (WELLBUTRIN XL) 150 MG extended release tablet; Take 1 tablet by mouth every morning For ADHD

## 2025-08-17 DIAGNOSIS — F90.9 ATTENTION DEFICIT HYPERACTIVITY DISORDER (ADHD), UNSPECIFIED ADHD TYPE: ICD-10-CM

## 2025-08-17 RX ORDER — BUPROPION HYDROCHLORIDE 150 MG/1
150 TABLET ORAL EVERY MORNING
Qty: 30 TABLET | Refills: 3 | Status: SHIPPED | OUTPATIENT
Start: 2025-08-17